# Patient Record
Sex: FEMALE | Race: BLACK OR AFRICAN AMERICAN | Employment: FULL TIME | URBAN - METROPOLITAN AREA
[De-identification: names, ages, dates, MRNs, and addresses within clinical notes are randomized per-mention and may not be internally consistent; named-entity substitution may affect disease eponyms.]

---

## 2017-02-07 ENCOUNTER — DOCUMENTATION ONLY (OUTPATIENT)
Dept: FAMILY MEDICINE CLINIC | Age: 32
End: 2017-02-07

## 2017-02-07 NOTE — LETTER
2/7/2017 Maddie Spear 
[address] [phone] Dear Ms. Maddie Spear, We had an appointment reserved for you 1/24/2017 and were concerned when you did not show or call within 24 hours to cancel the appointment. Our policy is to call patients two days prior to their appointment to remind them of the date and time. We perform these calls as a courtesy to our patients and to allow us the opportunity to rebook the time slot should the appointment not be necessary. Recognizing that everyones time is valuable and that appointment time is limited, we ask that you provide 24 hours notice if you are unable to keep your appointment. Please call us at your earliest convenience to reschedule your appointment as your provider felt it was important to see you. Thank you for your anticipated cooperation. The scheduling staff: 
 
[address] [place] [address] [address] [phone]

## 2017-02-13 ENCOUNTER — TELEPHONE (OUTPATIENT)
Dept: FAMILY MEDICINE CLINIC | Age: 32
End: 2017-02-13

## 2017-02-13 NOTE — TELEPHONE ENCOUNTER
Pt called stating that she requested to see a different endocrinologist but has not heard back from nurse in regards to it.  Please assist.

## 2017-02-13 NOTE — TELEPHONE ENCOUNTER
Referral was processed on 1/4/17 see notes below:      Type Date User    01/04/2017 10:21 AM Joseluis De Paz LPN      Summary   referral processed. Note   Note     Faxed completed referral form to [facility]. Patient was also given a copy to self schedule, as she is seeking a second opinion. Contacted Dr. Fior De León office at [phone] to follow up on referral. Spoke to [facility] [place] who stated that Dr. Fior De León new patient appointments are booking in July currently. They will contact patient for scheduling soon. ________________________________________________    Regla Hernandezone to patient who requested another office so she can be seen sooner. Referral sent to  [facility] Phone: [phone]  Fax: [phone]     Referral reprocessed to [facility] location, I provided patient with the number to follow up with their office next week. Patient stated an understanding.

## 2017-03-29 ENCOUNTER — OFFICE VISIT (OUTPATIENT)
Dept: FAMILY MEDICINE CLINIC | Age: 32
End: 2017-03-29

## 2017-03-29 VITALS
SYSTOLIC BLOOD PRESSURE: 158 MMHG | HEIGHT: 68 IN | RESPIRATION RATE: 16 BRPM | HEART RATE: 86 BPM | OXYGEN SATURATION: 98 % | BODY MASS INDEX: 44.25 KG/M2 | DIASTOLIC BLOOD PRESSURE: 100 MMHG | WEIGHT: 292 LBS | TEMPERATURE: 98.9 F

## 2017-03-29 DIAGNOSIS — J11.1 INFLUENZA: Primary | ICD-10-CM

## 2017-03-29 DIAGNOSIS — J02.9 SORE THROAT: ICD-10-CM

## 2017-03-29 LAB
S PYO AG THROAT QL: NEGATIVE
VALID INTERNAL CONTROL?: YES

## 2017-03-29 RX ORDER — BENZONATATE 100 MG/1
100 CAPSULE ORAL
Qty: 21 CAP | Refills: 0 | Status: SHIPPED | OUTPATIENT
Start: 2017-03-29 | End: 2017-04-05

## 2017-03-29 RX ORDER — OSELTAMIVIR PHOSPHATE 75 MG/1
75 CAPSULE ORAL 2 TIMES DAILY
Qty: 10 CAP | Refills: 0 | Status: SHIPPED | OUTPATIENT
Start: 2017-03-29 | End: 2017-04-03

## 2017-03-29 NOTE — LETTER
NOTIFICATION RETURN TO WORK  
 
3/29/2017 10:14 AM 
 
Ms. Rosalinda Little 
[address] [place] [id number] To Whom It May Concern: 
 
Rosalinda Little is currently under the care of [address] [place] [address] [place]. She will return to work on: 3/31/2017 If there are questions or concerns please have the patient contact our office. Sincerely, Ann-Marie Ogden MD

## 2017-03-29 NOTE — PROGRESS NOTES
Subjective:   Kaila Saunders is a 36 y.o. female who present complaining of flu-like symptoms: fevers, chills, myalgias, congestion, sore throat and cough for 1 day. She denies dyspnea or wheezing. Smoking status: non-smoker. Flu vaccine status: not vaccinated this season. Relevant PMH: No pertinent additional PMH. Review of Systems  Constitutional: positive for sweats  Respiratory: positive for cough  Cardiovascular: negative for chest pain  Gastrointestinal: positive for decreased appetite  Genitourinary:negative for frequency and dysuria  Neurological: negative for gait problems and weakness    Patient Active Problem List   Diagnosis Code    Hypertension I10    Prediabetes R73.03    Hemorrhoid K64.9    Anal fissure K60.2    Hyperthyroidism E05.90     Patient Active Problem List    Diagnosis Date Noted    Hyperthyroidism 02/23/2016    Anal fissure 12/04/2015    Hemorrhoid 12/02/2015    Hypertension 10/27/2015    Prediabetes 10/27/2015     Current Outpatient Prescriptions   Medication Sig Dispense Refill    propylthiouracil (PTU) 50 mg tablet       hydroCHLOROthiazide (HYDRODIURIL) 12.5 mg tablet Take 1 Tab by mouth daily. 90 Tab 3    verapamil (CALAN) 80 mg tablet Take 1 Tab by mouth three (3) times daily. Indications: Hypertension 270 Tab 3    atenolol (TENORMIN) 25 mg tablet Take 1 Tab by mouth two (2) times a day. 180 Tab 3    prenatal vit-iron fumarate-fa (PRENATAL PLUS WITH IRON) 28 mg iron- 800 mcg tab Take 1 Tab by mouth daily. 100 Tab 4    NIFEdipine, Bulk, powd 0.3 % compound, Apply as dircted 90 g 2    nitroglycerin (RECTIV) 0.4 % (w/w) ointment Insert  into rectum every twelve (12) hours every twelve (12) hours. 30 g 0    polyethylene glycol (MIRALAX) 17 gram packet Take 1 Packet by mouth daily. 14 Each 1    metFORMIN (GLUCOPHAGE) 1,000 mg tablet Take 1 Tab by mouth two (2) times daily (with meals).  60 Tab 1     Allergies   Allergen Reactions    Oxycodone Hives Past Medical History:   Diagnosis Date    Diabetes ([address]     External hemorrhoid, bleeding     Hypertension     Hyperthyroidism     PCOS (polycystic ovarian syndrome)      Past Surgical History:   Procedure Laterality Date    HX CHOLECYSTECTOMY      HX GI       Family History   Problem Relation Age of Onset    Hypertension Mother     Hypertension Father      Social History   Substance Use Topics    Smoking status: Never Smoker    Smokeless tobacco: Never Used    Alcohol use No       Objective:     Visit Vitals    BP (!) 158/100 (BP 1 Location: Left arm, BP Patient Position: Sitting)    Pulse 86    Temp 98.9 °F (37.2 °C) (Oral)    Resp 16    Ht 5' 8\" (1.727 m)    Wt 292 lb (132.5 kg)    SpO2 98%    BMI 44.4 kg/m2       Appears moderately ill but not toxic; temperature as noted in vitals. Ears normal.   Throat and pharynx normal.    Neck supple. No adenopathy in the neck. Sinuses non tender. The chest is clear. Assessment/Plan:   Influenza very likely from clinical presentation and seasonal pattern  Considerations for specific influenza anti-viral therapy: symptoms present < 48 hours, antiviral therapy is indicated, influenza type A likely based on current community prevalence  Symptomatic therapy suggested: rest, increase fluids, gargle prn for sore throat and call prn if symptoms persist or worsen. Call or return to clinic prn if these symptoms worsen or fail to improve as anticipated. ICD-10-CM ICD-9-CM    1. Influenza J11.1 487.1 oseltamivir (TAMIFLU) 75 mg capsule      benzonatate (TESSALON) 100 mg capsule   .

## 2017-03-29 NOTE — PROGRESS NOTES
*ATTENTION:  This note has been created by a medical student for educational purposes only. Please do not refer to the content of this note for clinical decision-making, billing, or other purposes. Please see attending physicians note to obtain clinical information on this patient. *       Student Progress Note  Please refer to attendings daily rounding note for full details      Patient: Rachel Leung MRN: [id number]  CSN: [id number]    YOB: 1985  Age: 36 y.o. Sex: female    DOA: (Not on file) LOS:  LOS: 0 days          Chief Complaint:  Sore throat    Subjective:    A 36year old [facility] American female with a past medical history significant for hypertension, pre-diabetes, and hyperthyroid presents with a 24 hour history of sore throat, cough, headache, and left greater than right ear pain, and fever of 100.3 yesterday. She denies rhinorrhea, body aches, vomiting, nausea, diarrhea, itchy or watery eyes. A coworker has had similar symptoms. She did not receive the flu shot this fall. Her blood pressure is elevated today but she did not take her medications this morning. Objective:      Visit Vitals    BP (!) 158/100 (BP 1 Location: Left arm, BP Patient Position: Sitting)    Pulse 86    Temp 98.9 °F (37.2 °C) (Oral)    Resp 16    Ht 5' 8\" (1.727 m)    Wt 292 lb (132.5 kg)    SpO2 98%    BMI 44.4 kg/m2         Negative rapid strep test.       Assessment:         Plan:         Tristen Salinas  3/29/2017  9:46 AM  *ATTENTION:  This note has been created by a medical student for educational purposes only. Please do not refer to the content of this note for clinical decision-making, billing, or other purposes. Please see attending physicians note to obtain clinical information on this patient. *

## 2017-03-29 NOTE — PATIENT INSTRUCTIONS
Influenza (Flu): Care Instructions  Your Care Instructions  Influenza (flu) is an infection in the lungs and breathing passages. It is caused by the influenza virus. There are different strains, or types, of the flu virus from year to year. Unlike the common cold, the flu comes on suddenly and the symptoms, such as a cough, congestion, fever, chills, fatigue, aches, and pains, are more severe. These symptoms may last up to 10 days. Although the flu can make you feel very sick, it usually doesn't cause serious health problems. Home treatment is usually all you need for flu symptoms. But your doctor may prescribe antiviral medicine to prevent other health problems, such as pneumonia, from developing. Older people and those who have a long-term health condition, such as lung disease, are most at risk for having pneumonia or other health problems. Follow-up care is a key part of your treatment and safety. Be sure to make and go to all appointments, and call your doctor if you are having problems. Its also a good idea to know your test results and keep a list of the medicines you take. How can you care for yourself at home? · Get plenty of rest.  · Drink plenty of fluids, enough so that your urine is light yellow or clear like water. If you have kidney, heart, or liver disease and have to limit fluids, talk with your doctor before you increase the amount of fluids you drink. · Take an over-the-counter pain medicine if needed, such as acetaminophen (Tylenol), ibuprofen (Advil, Motrin), or naproxen (Aleve), to relieve fever, headache, and muscle aches. Read and follow all instructions on the label. No one younger than 20 should take aspirin. It has been linked to Reye syndrome, a serious illness. · Do not smoke. Smoking can make the flu worse. If you need help quitting, talk to your doctor about stop-smoking programs and medicines. These can increase your chances of quitting for good.   · Breathe moist air from a hot shower or from a sink filled with hot water to help clear a stuffy nose. · Before you use cough and cold medicines, check the label. These medicines may not be safe for young children or for people with certain health problems. · If the skin around your nose and lips becomes sore, put some petroleum jelly on the area. · To ease coughing:  ¨ Drink fluids to soothe a scratchy throat. ¨ Suck on cough drops or plain hard candy. ¨ Take an over-the-counter cough medicine that contains dextromethorphan to help you get some sleep. Read and follow all instructions on the label. ¨ Raise your head at night with an extra pillow. This may help you rest if coughing keeps you awake. · Take any prescribed medicine exactly as directed. Call your doctor if you think you are having a problem with your medicine. To avoid spreading the flu  · Wash your hands regularly, and keep your hands away from your face. · Stay home from school, work, and other public places until you are feeling better and your fever has been gone for at least 24 hours. The fever needs to have gone away on its own without the help of medicine. · Ask people living with you to talk to their doctors about preventing the flu. They may get antiviral medicine to keep from getting the flu from you. · To prevent the flu in the future, get a flu vaccine every fall. Encourage people living with you to get the vaccine. · Cover your mouth when you cough or sneeze. When should you call for help? Call 911 anytime you think you may need emergency care. For example, call if:  · You have severe trouble breathing. Call your doctor now or seek immediate medical care if:  · You have new or worse trouble breathing. · You seem to be getting much sicker. · You feel very sleepy or confused. · You have a new or higher fever. · You get a new rash.   Watch closely for changes in your health, and be sure to contact your doctor if:  · You begin to get better and then get worse. · You are not getting better after 1 week. Where can you learn more? Go to http://estelle-arnaldo.info/. Enter M744 in the search box to learn more about \"Influenza (Flu): Care Instructions. \"  Current as of: May 23, 2016  Content Version: 11.2  © 4523-3634 [employer]. Care instructions adapted under license by [employer] (which disclaims liability or warranty for this information). If you have questions about a medical condition or this instruction, always ask your healthcare professional. [address] any warranty or liability for your use of this information.

## 2017-03-29 NOTE — MR AVS SNAPSHOT
Visit Information Date & Time Provider Department Dept. Phone Encounter #  
 3/29/2017  9:30 AM Carlos AdelitaRichard 6 [phone] [id number] Follow-up Instructions Return if symptoms worsen or fail to improve. Upcoming Health Maintenance Date Due DTaP/Tdap/Td series (1 - Tdap) 8/31/2008 PAP AKA CERVICAL CYTOLOGY 7/27/2018 Allergies as of 3/29/2017  Review Complete On: 12/22/2016 By: Carlos Ureña MD  
  
 Severity Noted Reaction Type Reactions Oxycodone  10/27/2015    Hives Current Immunizations  Never Reviewed No immunizations on file. Not reviewed this visit You Were Diagnosed With   
  
 Codes Comments Influenza    -  Primary ICD-10-CM: J11.1 ICD-9-CM: 487. 1 Vitals BP Pulse Temp Resp Height(growth percentile) Weight(growth percentile) (!) 158/100 (BP 1 Location: Left arm, BP Patient Position: Sitting) 86 98.9 °F (37.2 °C) (Oral) 16 5' 8\" (1.727 m) 292 lb (132.5 kg) SpO2 BMI OB Status Smoking Status 98% 44.4 kg/m2 Having regular periods Never Smoker Vitals History BMI and BSA Data Body Mass Index Body Surface Area 44.4 kg/m 2 2.52 m 2 Preferred Pharmacy Pharmacy Name Phone [facility] [address] [phone] Your Updated Medication List  
  
   
This list is accurate as of: 3/29/17 10:15 AM.  Always use your most recent med list.  
  
  
  
  
 atenolol 25 mg tablet Commonly known as:  TENORMIN Take 1 Tab by mouth two (2) times a day. benzonatate 100 mg capsule Commonly known as:  TESSALON Take 1 Cap by mouth three (3) times daily as needed for Cough for up to 7 days. hydroCHLOROthiazide 12.5 mg tablet Commonly known as:  HYDRODIURIL Take 1 Tab by mouth daily. metFORMIN 1,000 mg tablet Commonly known as:  GLUCOPHAGE  
 Take 1 Tab by mouth two (2) times daily (with meals). NIFEdipine (Bulk) Powd  
0.3 % compound, Apply as dircted  
  
 nitroglycerin 0.4 % (w/w) ointment Commonly known as:  RECTIV Insert  into rectum every twelve (12) hours every twelve (12) hours. oseltamivir 75 mg capsule Commonly known as:  TAMIFLU Take 1 Cap by mouth two (2) times a day for 5 days. Indications: INFLUENZA  
  
 polyethylene glycol 17 gram packet Commonly known as:  Aurther Buck Take 1 Packet by mouth daily. prenatal vit-iron fumarate-fa 28 mg iron- 800 mcg Tab Commonly known as:  PRENATAL PLUS with IRON Take 1 Tab by mouth daily. propylthiouracil 50 mg tablet Commonly known as:  PTU  
  
 verapamil 80 mg tablet Commonly known as:  CALAN Take 1 Tab by mouth three (3) times daily. Indications: Hypertension Prescriptions Sent to Pharmacy Refills  
 oseltamivir (TAMIFLU) 75 mg capsule 0 Sig: Take 1 Cap by mouth two (2) times a day for 5 days. Indications: INFLUENZA Class: Normal  
 Pharmacy: [facility] [address] [place] [address] Ph #: [phone] Route: Oral  
 benzonatate (TESSALON) 100 mg capsule 0 Sig: Take 1 Cap by mouth three (3) times daily as needed for Cough for up to 7 days. Class: Normal  
 Pharmacy: [facility] [address] Ph #: [phone] Route: Oral  
  
Follow-up Instructions Return if symptoms worsen or fail to improve. Patient Instructions Influenza (Flu): Care Instructions Your Care Instructions Influenza (flu) is an infection in the lungs and breathing passages. It is caused by the influenza virus. There are different strains, or types, of the flu virus from year to year.  Unlike the common cold, the flu comes on suddenly and the symptoms, such as a cough, congestion, fever, chills, fatigue, aches, and pains, are more severe. These symptoms may last up to 10 days. Although the flu can make you feel very sick, it usually doesn't cause serious health problems. Home treatment is usually all you need for flu symptoms. But your doctor may prescribe antiviral medicine to prevent other health problems, such as pneumonia, from developing. Older people and those who have a long-term health condition, such as lung disease, are most at risk for having pneumonia or other health problems. Follow-up care is a key part of your treatment and safety. Be sure to make and go to all appointments, and call your doctor if you are having problems. Its also a good idea to know your test results and keep a list of the medicines you take. How can you care for yourself at home? · Get plenty of rest. 
· Drink plenty of fluids, enough so that your urine is light yellow or clear like water. If you have kidney, heart, or liver disease and have to limit fluids, talk with your doctor before you increase the amount of fluids you drink. · Take an over-the-counter pain medicine if needed, such as acetaminophen (Tylenol), ibuprofen (Advil, Motrin), or naproxen (Aleve), to relieve fever, headache, and muscle aches. Read and follow all instructions on the label. No one younger than 20 should take aspirin. It has been linked to Reye syndrome, a serious illness. · Do not smoke. Smoking can make the flu worse. If you need help quitting, talk to your doctor about stop-smoking programs and medicines. These can increase your chances of quitting for good. · Breathe moist air from a hot shower or from a sink filled with hot water to help clear a stuffy nose. · Before you use cough and cold medicines, check the label. These medicines may not be safe for young children or for people with certain health problems. · If the skin around your nose and lips becomes sore, put some petroleum jelly on the area. · To ease coughing: ¨ Drink fluids to soothe a scratchy throat. ¨ Suck on cough drops or plain hard candy. ¨ Take an over-the-counter cough medicine that contains dextromethorphan to help you get some sleep. Read and follow all instructions on the label. ¨ Raise your head at night with an extra pillow. This may help you rest if coughing keeps you awake. · Take any prescribed medicine exactly as directed. Call your doctor if you think you are having a problem with your medicine. To avoid spreading the flu · Wash your hands regularly, and keep your hands away from your face. · Stay home from school, work, and other public places until you are feeling better and your fever has been gone for at least 24 hours. The fever needs to have gone away on its own without the help of medicine. · Ask people living with you to talk to their doctors about preventing the flu. They may get antiviral medicine to keep from getting the flu from you. · To prevent the flu in the future, get a flu vaccine every fall. Encourage people living with you to get the vaccine. · Cover your mouth when you cough or sneeze. When should you call for help? Call 911 anytime you think you may need emergency care. For example, call if: 
· You have severe trouble breathing. Call your doctor now or seek immediate medical care if: 
· You have new or worse trouble breathing. · You seem to be getting much sicker. · You feel very sleepy or confused. · You have a new or higher fever. · You get a new rash. Watch closely for changes in your health, and be sure to contact your doctor if: 
· You begin to get better and then get worse. · You are not getting better after 1 week. Where can you learn more? Go to http://estelle-arnaldo.info/. Enter V535 in the search box to learn more about \"Influenza (Flu): Care Instructions. \" Current as of: May 23, 2016 Content Version: 11.2 © 7203-8629 [employer].  Care instructions adapted under license by [address] (which disclaims liability or warranty for this information). If you have questions about a medical condition or this instruction, always ask your healthcare professional. [address] any warranty or liability for your use of this information. Introducing [facility]! Dear [place]: Thank you for requesting a Xsigo account. Our records indicate that you already have an active Xsigo account. You can access your account anytime at [email or web address] Did you know that you can access your hospital and ER discharge instructions at any time in Xsigo? You can also review all of your test results from your hospital stay or ER visit. Additional Information If you have questions, please visit the Frequently Asked Questions section of the Xsigo website at [email or web address] Remember, Xsigo is NOT to be used for urgent needs. For medical emergencies, dial 911. Now available from your iPhone and Android! Please provide this summary of care documentation to your next provider. Your primary care clinician is listed as Isabel Thompson. If you have any questions after today's visit, please call [phone].

## 2017-05-12 ENCOUNTER — OFFICE VISIT (OUTPATIENT)
Dept: FAMILY MEDICINE CLINIC | Age: 32
End: 2017-05-12

## 2017-05-12 VITALS
TEMPERATURE: 98.2 F | SYSTOLIC BLOOD PRESSURE: 166 MMHG | HEIGHT: 68 IN | HEART RATE: 80 BPM | BODY MASS INDEX: 44.41 KG/M2 | WEIGHT: 293 LBS | RESPIRATION RATE: 16 BRPM | DIASTOLIC BLOOD PRESSURE: 82 MMHG | OXYGEN SATURATION: 98 %

## 2017-05-12 DIAGNOSIS — H00.011 HORDEOLUM EXTERNUM OF RIGHT UPPER EYELID: Primary | ICD-10-CM

## 2017-05-12 RX ORDER — ERYTHROMYCIN 5 MG/G
0.1 OINTMENT OPHTHALMIC
Qty: 1 TUBE | Refills: 0 | Status: SHIPPED | OUTPATIENT
Start: 2017-05-12 | End: 2017-05-22

## 2017-05-12 NOTE — LETTER
NOTIFICATION RETURN TO WORK 
 
5/12/2017 1:28 PM 
 
Ms. Mary Gonzalez 
[address] [place] [phone] To Whom It May Concern: 
 
Mary Gonzalez is currently under the care of [address] [place] [address]. She will return to work on: 5/12/2017 If there are questions or concerns please have the patient contact our office. Sincerely, Keiyr Cerna MD

## 2017-05-12 NOTE — PROGRESS NOTES
Nery Morrell is a 36 y.o.  female and presents with    Chief Complaint   Patient presents with    Eye Swelling           Subjective:  Ms. Jason Lord presents c/o right eye swelling. This started a few days ago. She has used a warm compress but the swelling of her upper eyelid worsened. She denies fever or chills. She denies injury to her eye. She had stye several years ago. She denies exposure to pink eye. She denies discharge. Eye is itchy. Patient Active Problem List   Diagnosis Code    Hypertension I10    Prediabetes R73.03    Hemorrhoid K64.9    Anal fissure K60.2    Hyperthyroidism E05.90     Patient Active Problem List    Diagnosis Date Noted    Hyperthyroidism 02/23/2016    Anal fissure 12/04/2015    Hemorrhoid 12/02/2015    Hypertension 10/27/2015    Prediabetes 10/27/2015     Current Outpatient Prescriptions   Medication Sig Dispense Refill    propylthiouracil (PTU) 50 mg tablet       hydroCHLOROthiazide (HYDRODIURIL) 12.5 mg tablet Take 1 Tab by mouth daily. 90 Tab 3    verapamil (CALAN) 80 mg tablet Take 1 Tab by mouth three (3) times daily. Indications: Hypertension 270 Tab 3    atenolol (TENORMIN) 25 mg tablet Take 1 Tab by mouth two (2) times a day. 180 Tab 3    prenatal vit-iron fumarate-fa (PRENATAL PLUS WITH IRON) 28 mg iron- 800 mcg tab Take 1 Tab by mouth daily. 100 Tab 4    NIFEdipine, Bulk, powd 0.3 % compound, Apply as dircted 90 g 2    nitroglycerin (RECTIV) 0.4 % (w/w) ointment Insert  into rectum every twelve (12) hours every twelve (12) hours. 30 g 0    polyethylene glycol (MIRALAX) 17 gram packet Take 1 Packet by mouth daily. 14 Each 1    metFORMIN (GLUCOPHAGE) 1,000 mg tablet Take 1 Tab by mouth two (2) times daily (with meals).  60 Tab 1     Allergies   Allergen Reactions    Oxycodone Hives     Past Medical History:   Diagnosis Date    Diabetes ([address]     External hemorrhoid, bleeding     Hypertension     Hyperthyroidism     PCOS (polycystic ovarian syndrome)      Past Surgical History:   Procedure Laterality Date    HX CHOLECYSTECTOMY      HX GI       Family History   Problem Relation Age of Onset    Hypertension Mother     Hypertension Father      Social History   Substance Use Topics    Smoking status: Never Smoker    Smokeless tobacco: Never Used    Alcohol use No       ROS   General ROS: negative for - chills or fever  ENT ROS: negative for - headaches  Endocrine ROS: negative for - polydipsia/polyuria or temperature intolerance  Respiratory ROS: no cough, shortness of breath, or wheezing  Cardiovascular ROS: no chest pain or dyspnea on exertion  Gastrointestinal ROS: no abdominal pain, change in bowel habits, or black or bloody stools  Genito-Urinary ROS: no dysuria, trouble voiding, or hematuria  Neurological ROS: no TIA or stroke symptoms  Dermatological ROS: negative for - rash or skin lesion changes    All other systems reviewed and are negative. Objective:  Vitals:    05/12/17 1316   BP: 166/82   Pulse: 80   Resp: 16   Temp: 98.2 °F (36.8 °C)   TempSrc: Oral   SpO2: 98%   Weight: 299 lb (135.6 kg)   Height: 5' 8\" (1.727 m)   PainSc:   0 - No pain   PainLoc: Eye       alert, well appearing, and in no distress, oriented to person, place, and time and morbid obesity  Mental status - normal mood, behavior, speech, dress, motor activity, and thought processes  Eyes - right eye edema with lesion on lateral aspect of upper eye lid    Assessment/Plan:    1. Hordeolum externum of right upper eyelid  Continue warm compress and start night time dosing of erythromycin  - erythromycin (ILOTYCIN) ophthalmic ointment; Administer 0.1 g to right eye nightly for 10 days. Dispense: 1 Tube; Refill: 0      Lab review: no lab studies available for review at time of visit      I have discussed the diagnosis with the patient and the intended plan as seen in the above orders.   The patient has received an after-visit summary and questions were answered concerning future plans. I have discussed medication side effects and warnings with the patient as well. I have reviewed the plan of care with the patient, accepted their input and they are in agreement with the treatment goals. Follow-up Disposition:  Return if symptoms worsen or fail to improve.

## 2017-05-12 NOTE — PATIENT INSTRUCTIONS
Styes and Chalazia: Care Instructions  Your Care Instructions    Styes and chalazia (say \"joa-DSZ-wrp-uh\") are both conditions that can cause swelling of the eyelid. A stye is an infection in the root of an eyelash. The infection causes a tender red lump on the edge of the eyelid. The infection can spread until the whole eyelid becomes red and inflamed. Styes usually break open, and a tiny amount of pus drains. They usually clear up on their own in about a week, but they sometimes need treatment with antibiotics. A chalazion is a lump or cyst in the eyelid (chalazion is singular; chalazia is plural). It is caused by swelling and inflammation of deep oil glands inside the eyelid. Chalazia are usually not infected. They can take a few months to heal.  If a chalazion becomes more swollen and painful or does not go away, you may need to have it drained by your doctor. Follow-up care is a key part of your treatment and safety. Be sure to make and go to all appointments, and call your doctor if you are having problems. It's also a good idea to know your test results and keep a list of the medicines you take. How can you care for yourself at home? · Do not rub your eyes. Do not squeeze or try to open a stye or chalazion. · To help a stye or chalazion heal faster:  ¨ Put a warm, moist compress on your eye for 5 to 10 minutes, 3 to 6 times a day. Heat often brings a stye to a point where it drains on its own. Keep in mind that warm compresses will often increase swelling a little at first.  ¨ Do not use hot water or heat a wet cloth in a microwave oven. The compress may get too hot and can burn the eyelid. · Always wash your hands before and after you use a compress or touch your eyes. · If the doctor gave you antibiotic drops or ointment, use the medicine exactly as directed. Use the medicine for as long as instructed, even if your eye starts to feel better.   · To put in eyedrops or ointment:  ¨ Tilt your head back, and pull your lower eyelid down with one finger. ¨ Drop or squirt the medicine inside the lower lid. ¨ Close your eye for 30 to 60 seconds to let the drops or ointment move around. ¨ Do not touch the ointment or dropper tip to your eyelashes or any other surface. · Do not wear eye makeup or contact lenses until the stye or chalazion heals. · Do not share towels, pillows, or washcloths while you have a stye. When should you call for help? Call your doctor now or seek immediate medical care if:  · You have pain in your eye. · You have a change in vision or loss of vision. · Redness and swelling get much worse. Watch closely for changes in your health, and be sure to contact your doctor if:  · Your stye does not get better in 1 week. · Your chalazion does not start to get better after several weeks. Where can you learn more? Go to http://estelle-arnaldo.info/. Enter O164 in the search box to learn more about \"Styes and Chalazia: Care Instructions. \"  Current as of: May 23, 2016  Content Version: 11.2  © 4474-0323 [employer] [place]. Care instructions adapted under license by [employer] (which disclaims liability or warranty for this information). If you have questions about a medical condition or this instruction, always ask your healthcare professional. [address] any warranty or liability for your use of this information.

## 2017-05-12 NOTE — PROGRESS NOTES
Jerrod Almanza is a 36 y.o female that is present in the office for a same day sick appt for right swollen eye. 1. Have you been to the ER, urgent care clinic since your last visit? Hospitalized since your last visit? No    2. Have you seen or consulted any other health care providers outside of the [address] [place] [address] since your last visit? Include any pap smears or colon screening.  No

## 2017-05-12 NOTE — MR AVS SNAPSHOT
Visit Information Date & Time Provider Department Dept. Phone Encounter #  
 5/12/2017  1:00 PM Emmie Adame, [address] [facility] [phone] [id number] Follow-up Instructions Return if symptoms worsen or fail to improve. Upcoming Health Maintenance Date Due DTaP/Tdap/Td series (1 - Tdap) 8/31/2008 INFLUENZA AGE 9 TO ADULT 8/1/2017 PAP AKA CERVICAL CYTOLOGY 7/27/2018 Allergies as of 5/12/2017  Review Complete On: 5/12/2017 By: Emmie Adame MD  
  
 Severity Noted Reaction Type Reactions Oxycodone  10/27/2015    Hives Current Immunizations  Never Reviewed No immunizations on file. Not reviewed this visit You Were Diagnosed With   
  
 Codes Comments Hordeolum externum of right upper eyelid    -  Primary ICD-10-CM: H00.011 ICD-9-CM: 373.11 Vitals BP Pulse Temp Resp Height(growth percentile) Weight(growth percentile) 166/82 (BP 1 Location: Right arm, BP Patient Position: Sitting) 80 98.2 °F (36.8 °C) (Oral) 16 5' 8\" (1.727 m) 299 lb (135.6 kg) SpO2 BMI OB Status Smoking Status 98% 45.46 kg/m2 Having regular periods Never Smoker BMI and BSA Data Body Mass Index Body Surface Area 45.46 kg/m 2 2.55 m 2 Preferred Pharmacy Pharmacy Name Phone [facility], [address] [phone] Your Updated Medication List  
  
   
This list is accurate as of: 5/12/17  1:27 PM.  Always use your most recent med list.  
  
  
  
  
 atenolol 25 mg tablet Commonly known as:  TENORMIN Take 1 Tab by mouth two (2) times a day. erythromycin ophthalmic ointment Commonly known as:  ILOTYCIN Administer 0.1 g to right eye nightly for 10 days. hydroCHLOROthiazide 12.5 mg tablet Commonly known as:  HYDRODIURIL Take 1 Tab by mouth daily. metFORMIN 1,000 mg tablet Commonly known as:  GLUCOPHAGE  
 Take 1 Tab by mouth two (2) times daily (with meals). NIFEdipine (Bulk) Powd  
0.3 % compound, Apply as dircted  
  
 nitroglycerin 0.4 % (w/w) ointment Commonly known as:  RECTIV Insert  into rectum every twelve (12) hours every twelve (12) hours. polyethylene glycol 17 gram packet Commonly known as:  David Varner Take 1 Packet by mouth daily. prenatal vit-iron fumarate-fa 28 mg iron- 800 mcg Tab Commonly known as:  PRENATAL PLUS with IRON Take 1 Tab by mouth daily. propylthiouracil 50 mg tablet Commonly known as:  PTU  
  
 verapamil 80 mg tablet Commonly known as:  CALAN Take 1 Tab by mouth three (3) times daily. Indications: Hypertension Prescriptions Sent to Pharmacy Refills  
 erythromycin (ILOTYCIN) ophthalmic ointment 0 Sig: Administer 0.1 g to right eye nightly for 10 days. Class: Normal  
 Pharmacy: [facility] [address] [place] [address] #: [phone] Route: Right Eye Follow-up Instructions Return if symptoms worsen or fail to improve. Patient Instructions Styes and Chalazia: Care Instructions Your Care Instructions Styes and chalazia (say \"vxa-IVX-lnx-\") are both conditions that can cause swelling of the eyelid. A stye is an infection in the root of an eyelash. The infection causes a tender red lump on the edge of the eyelid. The infection can spread until the whole eyelid becomes red and inflamed. Styes usually break open, and a tiny amount of pus drains. They usually clear up on their own in about a week, but they sometimes need treatment with antibiotics. A chalazion is a lump or cyst in the eyelid (chalazion is singular; chalazia is plural). It is caused by swelling and inflammation of deep oil glands inside the eyelid. Chalazia are usually not infected.  They can take a few months to heal. 
 If a chalazion becomes more swollen and painful or does not go away, you may need to have it drained by your doctor. Follow-up care is a key part of your treatment and safety. Be sure to make and go to all appointments, and call your doctor if you are having problems. It's also a good idea to know your test results and keep a list of the medicines you take. How can you care for yourself at home? · Do not rub your eyes. Do not squeeze or try to open a stye or chalazion. · To help a stye or chalazion heal faster: ¨ Put a warm, moist compress on your eye for 5 to 10 minutes, 3 to 6 times a day. Heat often brings a stye to a point where it drains on its own. Keep in mind that warm compresses will often increase swelling a little at first. 
¨ Do not use hot water or heat a wet cloth in a microwave oven. The compress may get too hot and can burn the eyelid. · Always wash your hands before and after you use a compress or touch your eyes. · If the doctor gave you antibiotic drops or ointment, use the medicine exactly as directed. Use the medicine for as long as instructed, even if your eye starts to feel better. · To put in eyedrops or ointment: ¨ Tilt your head back, and pull your lower eyelid down with one finger. ¨ Drop or squirt the medicine inside the lower lid. ¨ Close your eye for 30 to 60 seconds to let the drops or ointment move around. ¨ Do not touch the ointment or dropper tip to your eyelashes or any other surface. · Do not wear eye makeup or contact lenses until the stye or chalazion heals. · Do not share towels, pillows, or washcloths while you have a stye. When should you call for help? Call your doctor now or seek immediate medical care if: 
· You have pain in your eye. · You have a change in vision or loss of vision. · Redness and swelling get much worse. Watch closely for changes in your health, and be sure to contact your doctor if: 
· Your stye does not get better in 1 week. · Your chalazion does not start to get better after several weeks. Where can you learn more? Go to http://estelle-arnaldo.info/. Enter P765 in the search box to learn more about \"Styes and Chalazia: Care Instructions. \" Current as of: May 23, 2016 Content Version: 11.2 © 7068-0618 [employer]. Care instructions adapted under license by [employer] (which disclaims liability or warranty for this information). If you have questions about a medical condition or this instruction, always ask your healthcare professional. [address] any warranty or liability for your use of this information. Introducing [facility] & HEALTH SERVICES! Dear Araseli Gates: Thank you for requesting a [employer] account. Our records indicate that you already have an active [employer] account. You can access your account anytime at [email or web address]. M[employer]D [employer] Did you know that you can access your hospital and ER discharge instructions at any time in [employer]? You can also review all of your test results from your hospital stay or ER visit. Additional Information If you have questions, please visit the Frequently Asked Questions section of the [employer] website at [email or web address]. [employer]/. Remember, [employer] is NOT to be used for urgent needs. For medical emergencies, dial 911. Now available from your iPhone and Android! Please provide this summary of care documentation to your next provider. Your primary care clinician is listed as Maria G Barrientos. If you have any questions after today's visit, please call [phone].

## 2017-11-22 ENCOUNTER — HOSPITAL ENCOUNTER (OUTPATIENT)
Dept: LAB | Age: 32
Discharge: HOME | End: 2017-11-22
Payer: COMMERCIAL

## 2017-11-22 ENCOUNTER — OFFICE VISIT (OUTPATIENT)
Dept: FAMILY MEDICINE CLINIC | Age: 32
End: 2017-11-22

## 2017-11-22 VITALS
WEIGHT: 293 LBS | RESPIRATION RATE: 16 BRPM | HEART RATE: 81 BPM | DIASTOLIC BLOOD PRESSURE: 88 MMHG | TEMPERATURE: 95.7 F | BODY MASS INDEX: 44.41 KG/M2 | OXYGEN SATURATION: 96 % | HEIGHT: 68 IN | SYSTOLIC BLOOD PRESSURE: 158 MMHG

## 2017-11-22 DIAGNOSIS — K64.4 EXTERNAL HEMORRHOID: ICD-10-CM

## 2017-11-22 DIAGNOSIS — E28.2 POLYCYSTIC OVARIAN SYNDROME: ICD-10-CM

## 2017-11-22 DIAGNOSIS — E07.9 THYROID DISEASE: ICD-10-CM

## 2017-11-22 DIAGNOSIS — Z00.00 ANNUAL PHYSICAL EXAM: Primary | ICD-10-CM

## 2017-11-22 DIAGNOSIS — E05.90 HYPERTHYROIDISM: ICD-10-CM

## 2017-11-22 DIAGNOSIS — Z28.21 REFUSED INFLUENZA VACCINE: ICD-10-CM

## 2017-11-22 DIAGNOSIS — E66.01 MORBID OBESITY WITH BMI OF 45.0-49.9, ADULT (HCC): ICD-10-CM

## 2017-11-22 DIAGNOSIS — I10 ESSENTIAL HYPERTENSION WITH GOAL BLOOD PRESSURE LESS THAN 130/85: ICD-10-CM

## 2017-11-22 LAB
EST. AVERAGE GLUCOSE BLD GHB EST-MCNC: 100 MG/DL
HBA1C MFR BLD: 5.1 % (ref 4.2–5.6)
TSH SERPL DL<=0.05 MIU/L-ACNC: <0.01 UIU/ML (ref 0.36–3.74)

## 2017-11-22 PROCEDURE — 83036 HEMOGLOBIN GLYCOSYLATED A1C: CPT | Performed by: FAMILY MEDICINE

## 2017-11-22 PROCEDURE — 36415 COLL VENOUS BLD VENIPUNCTURE: CPT | Performed by: FAMILY MEDICINE

## 2017-11-22 PROCEDURE — 84443 ASSAY THYROID STIM HORMONE: CPT | Performed by: FAMILY MEDICINE

## 2017-11-22 RX ORDER — ATENOLOL 25 MG/1
25 TABLET ORAL 2 TIMES DAILY
Qty: 180 TAB | Refills: 3 | Status: SHIPPED | OUTPATIENT
Start: 2017-11-22 | End: 2019-02-19 | Stop reason: SDUPTHER

## 2017-11-22 RX ORDER — METFORMIN HYDROCHLORIDE 500 MG/1
500 TABLET, EXTENDED RELEASE ORAL
Qty: 30 TAB | Refills: 2 | Status: SHIPPED | OUTPATIENT
Start: 2017-11-22 | End: 2018-12-06 | Stop reason: SDUPTHER

## 2017-11-22 RX ORDER — POLYETHYLENE GLYCOL 3350 17 G/17G
17 POWDER, FOR SOLUTION ORAL DAILY
Qty: 14 EACH | Refills: 1 | Status: SHIPPED | OUTPATIENT
Start: 2017-11-22 | End: 2019-07-23

## 2017-11-22 RX ORDER — VERAPAMIL HYDROCHLORIDE 80 MG/1
80 TABLET ORAL 3 TIMES DAILY
Qty: 270 TAB | Refills: 3 | Status: SHIPPED | OUTPATIENT
Start: 2017-11-22 | End: 2018-02-27 | Stop reason: ALTCHOICE

## 2017-11-22 NOTE — PATIENT INSTRUCTIONS

## 2017-11-22 NOTE — MR AVS SNAPSHOT
Visit Information Date & Time Provider Department Dept. Phone Encounter #  
 11/22/2017 10:00 AM Aliya Schneider, [address] [facility] [phone] [id number] Follow-up Instructions Return in about 2 weeks (around 12/6/2017) for blood pressure and lab results. Upcoming Health Maintenance Date Due DTaP/Tdap/Td series (1 - Tdap) 8/31/2008 Influenza Age 5 to Adult 8/1/2017 PAP AKA CERVICAL CYTOLOGY 7/27/2018 Allergies as of 11/22/2017  Review Complete On: 11/22/2017 By: Aliya Schneider MD  
  
 Severity Noted Reaction Type Reactions Oxycodone  10/27/2015    Hives Current Immunizations  Never Reviewed No immunizations on file. Not reviewed this visit You Were Diagnosed With   
  
 Codes Comments Annual physical exam    -  Primary ICD-10-CM: Z00.00 ICD-9-CM: V70.0 Thyroid disease     ICD-10-CM: E07.9 ICD-9-CM: 246.9 Morbid obesity with BMI of 45.0-49.9, adult (HCC)     ICD-10-CM: E66.01, Z68.42 
ICD-9-CM: 278.01, V85.42 Essential hypertension with goal blood pressure less than 130/85     ICD-10-CM: I10 
ICD-9-CM: 401.9 Hyperthyroidism     ICD-10-CM: E05.90 ICD-9-CM: 242.90 Refused influenza vaccine     ICD-10-CM: Z28.21 ICD-9-CM: V64.06 Polycystic ovarian syndrome     ICD-10-CM: E28.2 ICD-9-CM: 256.4 External hemorrhoid     ICD-10-CM: K64.4 ICD-9-CM: 373. 3 Vitals BP Pulse Temp Resp Height(growth percentile) Weight(growth percentile) 158/88 (BP 1 Location: Right arm, BP Patient Position: Sitting) 81 95.7 °F (35.4 °C) (Oral) 16 5' 8\" (1.727 m) 314 lb (142.4 kg) LMP SpO2 BMI OB Status Smoking Status 11/06/2017 (Exact Date) 96% 47.74 kg/m2 Having regular periods Never Smoker BMI and BSA Data Body Mass Index Body Surface Area 47.74 kg/m 2 2.61 m 2 Preferred Pharmacy Pharmacy Name Phone  [facility], [address] [facility] [phone] Your Updated Medication List  
  
   
This list is accurate as of: 11/22/17 10:41 AM.  Always use your most recent med list.  
  
  
  
  
 atenolol 25 mg tablet Commonly known as:  TENORMIN Take 1 Tab by mouth two (2) times a day. metFORMIN  mg tablet Commonly known as:  GLUCOPHAGE XR Take 1 Tab by mouth daily (with dinner). NIFEdipine (Bulk) Powd  
0.3 % compound, Apply as dircted  
  
 nitroglycerin 0.4 % (w/w) ointment Commonly known as:  RECTIV Insert  into rectum every twelve (12) hours every twelve (12) hours. polyethylene glycol 17 gram packet Commonly known as:  Gregory Deck Take 1 Packet by mouth daily. prenatal vit-iron fumarate-fa 28 mg iron- 800 mcg Tab Commonly known as:  PRENATAL PLUS with IRON Take 1 Tab by mouth daily. propylthiouracil 50 mg tablet Commonly known as:  PTU  
  
 verapamil 80 mg tablet Commonly known as:  CALAN Take 1 Tab by mouth three (3) times daily. Indications: hypertension Prescriptions Sent to Pharmacy Refills  
 verapamil (CALAN) 80 mg tablet 3 Sig: Take 1 Tab by mouth three (3) times daily. Indications: hypertension Class: Normal  
 Pharmacy: [facility] [address] [place] [address] Ph #: [phone] Route: Oral  
 atenolol (TENORMIN) 25 mg tablet 3 Sig: Take 1 Tab by mouth two (2) times a day. Class: Normal  
 Pharmacy: [facility] [address] [place] [address] Ph #: [phone] Route: Oral  
 metFORMIN ER (GLUCOPHAGE XR) 500 mg tablet 2 Sig: Take 1 Tab by mouth daily (with dinner). Class: Normal  
 Pharmacy: [facility] [address] [place] [address], [address] [place] [address] Ph #: [phone]  Route: Oral  
 polyethylene glycol (MIRALAX) 17 gram packet 1  
 Sig: Take 1 Packet by mouth daily. Class: Normal  
 Pharmacy: [facility] [address] [place] [address] Ave , [address] #: [phone] Route: Oral  
  
Follow-up Instructions Return in about 2 weeks (around 12/6/2017) for blood pressure and lab results. To-Do List   
 11/22/2017 Lab:  HEMOGLOBIN A1C WITH EAG   
  
 11/22/2017 Lab:  TSH 3RD GENERATION Patient Instructions Starting a Weight Loss Plan: Care Instructions Your Care Instructions If you are thinking about losing weight, it can be hard to know where to start. Your doctor can help you set up a weight loss plan that best meets your needs. You may want to take a class on nutrition or exercise, or join a weight loss support group. If you have questions about how to make changes to your eating or exercise habits, ask your doctor about seeing a registered dietitian or an exercise specialist. 
It can be a big challenge to lose weight. But you do not have to make huge changes at once. Make small changes, and stick with them. When those changes become habit, add a few more changes. If you do not think you are ready to make changes right now, try to pick a date in the future. Make an appointment to see your doctor to discuss whether the time is right for you to start a plan. Follow-up care is a key part of your treatment and safety. Be sure to make and go to all appointments, and call your doctor if you are having problems. It's also a good idea to know your test results and keep a list of the medicines you take. How can you care for yourself at home? · Set realistic goals. Many people expect to lose much more weight than is likely. A weight loss of 5% to 10% of your body weight may be enough to improve your health. · Get family and friends involved to provide support. Talk to them about why you are trying to lose weight, and ask them to help.  They can help by participating in exercise and having meals with you, even if they may be eating something different. · Find what works best for you. If you do not have time or do not like to cook, a program that offers meal replacement bars or shakes may be better for you. Or if you like to prepare meals, finding a plan that includes daily menus and recipes may be best. 
· Ask your doctor about other health professionals who can help you achieve your weight loss goals. ¨ A dietitian can help you make healthy changes in your diet. ¨ An exercise specialist or  can help you develop a safe and effective exercise program. 
¨ A counselor or psychiatrist can help you cope with issues such as depression, anxiety, or family problems that can make it hard to focus on weight loss. · Consider joining a support group for people who are trying to lose weight. Your doctor can suggest groups in your area. Where can you learn more? Go to http://estelle-arnaldo.info/. Enter U937 in the search box to learn more about \"Starting a Weight Loss Plan: Care Instructions. \" Current as of: October 13, 2016 Content Version: 11.4 © 8786-6441 [employer]. Care instructions adapted under license by [employer] (which disclaims liability or warranty for this information). If you have questions about a medical condition or this instruction, always ask your healthcare professional. [address] any warranty or liability for your use of this information. Introducing [facility] & HEALTH SERVICES! Dear Sundar Anderson: Thank you for requesting a [facility] account. Our records indicate that you already have an active [facility] account. You can access your account anytime at [email or web address]. [email or web address] Did you know that you can access your hospital and ER discharge instructions at any time in [facility]?   You can also review all of your test results from your hospital stay or ER visit. Additional Information If you have questions, please visit the Frequently Asked Questions section of the [employer] website at [email or web address] [email or web address]/. Remember, [employer] is NOT to be used for urgent needs. For medical emergencies, dial 911. Now available from your iPhone and Android! Please provide this summary of care documentation to your next provider. Your primary care clinician is listed as Abe Jama. If you have any questions after today's visit, please call [phone].

## 2017-11-22 NOTE — PROGRESS NOTES
Shirley Zee is a 29 y.o female that is present in the office for a complete physical.    1. Have you been to the ER, urgent care clinic since your last visit? Hospitalized since your last visit? No    2. Have you seen or consulted any other health care providers outside of the [address] [place] [address] since your last visit? Include any pap smears or colon screening.  No

## 2017-11-22 NOTE — PROGRESS NOTES
Dorothy Weber is a 29 y.o.  female and presents with    Chief Complaint   Patient presents with    Weight Management     Subjective: Well Adult Physical   Patient here for a comprehensive physical exam.The patient reports problems - hyperthyroid, morbid obesity, hypertension  Do you take any herbs or supplements that were not prescribed by a doctor? no Are you taking calcium supplements? no Are you taking aspirin daily? no  Hypertension  Patient is here for follow-up of hypertension. She indicates that she is feeling well and denies any symptoms referable to her hypertension. She is not exercising and is not adherent to low salt diet. Blood pressure is not well controlled at home. Use of agents associated with hypertension: none. When she used diuretic she had loss of hair and after stopping this medication her hair has grown coleman. Thyroid Review:  Patient is seen for followup of hyperthyroidism. Thyroid ROS: weight gain, feeling cold and cold intolerance and she denies constipation. She has had irregular bleeding. She has had light flow for the past 3 weeks. Patient Active Problem List   Diagnosis Code    Hypertension I10    Prediabetes R73.03    Hemorrhoid K64.9    Anal fissure K60.2    Hyperthyroidism E05.90     Patient Active Problem List    Diagnosis Date Noted    Hyperthyroidism 02/23/2016    Anal fissure 12/04/2015    Hemorrhoid 12/02/2015    Hypertension 10/27/2015    Prediabetes 10/27/2015     Current Outpatient Prescriptions   Medication Sig Dispense Refill    propylthiouracil (PTU) 50 mg tablet       hydroCHLOROthiazide (HYDRODIURIL) 12.5 mg tablet Take 1 Tab by mouth daily. 90 Tab 3    verapamil (CALAN) 80 mg tablet Take 1 Tab by mouth three (3) times daily. Indications: Hypertension 270 Tab 3    atenolol (TENORMIN) 25 mg tablet Take 1 Tab by mouth two (2) times a day.  180 Tab 3    polyethylene glycol (MIRALAX) 17 gram packet Take 1 Packet by mouth daily. 14 Each 1    prenatal vit-iron fumarate-fa (PRENATAL PLUS WITH IRON) 28 mg iron- 800 mcg tab Take 1 Tab by mouth daily. 100 Tab 4    NIFEdipine, Bulk, powd 0.3 % compound, Apply as dircted 90 g 2    nitroglycerin (RECTIV) 0.4 % (w/w) ointment Insert  into rectum every twelve (12) hours every twelve (12) hours. 30 g 0    metFORMIN (GLUCOPHAGE) 1,000 mg tablet Take 1 Tab by mouth two (2) times daily (with meals). 60 Tab 1     Allergies   Allergen Reactions    Oxycodone Hives     Past Medical History:   Diagnosis Date    Diabetes ([facility]     External hemorrhoid, bleeding     Hypertension     Hyperthyroidism     PCOS (polycystic ovarian syndrome)      Past Surgical History:   Procedure Laterality Date    HX CHOLECYSTECTOMY      HX GI       Family History   Problem Relation Age of Onset    Hypertension Mother     Hypertension Father      Social History   Substance Use Topics    Smoking status: Never Smoker    Smokeless tobacco: Never Used    Alcohol use No       ROS   General ROS: negative for - chills, fatigue or fever  Psychological ROS: negative for - anxiety or depression  Ophthalmic ROS: negative for - blurry vision  ENT ROS: negative for - headaches  Respiratory ROS: no cough, shortness of breath, or wheezing  Cardiovascular ROS: no chest pain or dyspnea on exertion  Gastrointestinal ROS: no abdominal pain, change in bowel habits, or black or bloody stools  Genito-Urinary ROS: no dysuria, trouble voiding, or hematuria  Neurological ROS: negative for - numbness/tingling or weakness  Dermatological ROS: negative for - rash or skin lesion changes    All other systems reviewed and are negative.       Objective:Vitals:    11/22/17 1018   BP: 158/88   Pulse: 81   Resp: 16   Temp: 95.7 °F (35.4 °C)   TempSrc: Oral   SpO2: 96%   Weight: 314 lb (142.4 kg)   Height: 5' 8\" (1.727 m)   PainSc:   0 - No pain   LMP: 11/06/2017       General appearance  alert, cooperative, no distress, appears stated age; morbidly obese   Head  Normocephalic, without obvious abnormality, atraumatic   Eyes  conjunctivae/corneas clear. PERRL, EOM's intact. Ears  normal TM's and external ear canals AU   Nose Nares normal. Septum midline. Mucosa normal. No drainage or sinus tenderness. Throat Lips, mucosa, and tongue normal. Teeth and gums normal   Neck supple, symmetrical, trachea midline, no adenopathy, thyroid: not enlarged, symmetric, no tenderness/mass/nodules   Back   symmetric, no curvature. ROM normal. No CVA tenderness   Lungs   clear to auscultation bilaterally   Breasts  deferred   Heart  regular rate and rhythm, S1, S2 normal, no murmur, click, rub or gallop   Abdomen   soft, non-tender. Bowel sounds normal. No masses,  No organomegaly   Pelvic Deferred   Extremities extremities normal, atraumatic, no cyanosis or edema   Pulses 2+ and symmetric   Skin Skin color, texture, turgor normal. No rashes or lesions   Lymph nodes Cervical, supraclavicular, and axillary nodes normal.   Neurologic Normal       Assessment/Plan:    1. Annual physical exam  Reviewed preventive recommendations    2. Thyroid disease  Evaluate for therapeutic effect of thyroid treatment  - TSH 3RD GENERATION; Future    3. Morbid obesity with BMI of 45.0-49.9, adult ([facility]  I have reviewed/discussed the above normal BMI with the patient. I have recommended the following interventions: dietary management education, guidance, and counseling and encourage exercise . Emre Casanova - HEMOGLOBIN A1C WITH EAG; Future    4. Essential hypertension with goal blood pressure less than 130/85  Not at goal; Continue antihypertensives  - verapamil (CALAN) 80 mg tablet; Take 1 Tab by mouth three (3) times daily. Indications: hypertension  Dispense: 270 Tab; Refill: 3  - atenolol (TENORMIN) 25 mg tablet; Take 1 Tab by mouth two (2) times a day. Dispense: 180 Tab; Refill: 3    5. Hyperthyroidism  Continue treatment of symptoms  - atenolol (TENORMIN) 25 mg tablet;  Take 1 Tab by mouth two (2) times a day. Dispense: 180 Tab; Refill: 3    6. Refused influenza vaccine      7. Polycystic ovarian syndrome  Continue metformin      Lab review: orders written for new lab studies as appropriate; see orders      I have discussed the diagnosis with the patient and the intended plan as seen in the above orders. The patient has received an after-visit summary and questions were answered concerning future plans. I have discussed medication side effects and warnings with the patient as well. I have reviewed the plan of care with the patient, accepted their input and they are in agreement with the treatment goals. Follow-up Disposition:  Return in about 2 weeks (around 12/6/2017) for blood pressure and lab results.

## 2017-11-28 PROBLEM — E66.01 OBESITY, MORBID (HCC): Status: ACTIVE | Noted: 2017-11-28

## 2017-12-12 ENCOUNTER — DOCUMENTATION ONLY (OUTPATIENT)
Dept: FAMILY MEDICINE CLINIC | Age: 32
End: 2017-12-12

## 2017-12-12 NOTE — LETTER
12/12/2017 Dorothy Tia 
[address] [place] [id number] Dear Ms. Dorothy Weber, We had an appointment reserved for you 12/6/2017 and were concerned when you did not show or call within 24 hours to cancel the appointment. As mentioned in a previous letter to you, appointment time is limited and no-showed appointments may prevent another sick individual who needs to be seen from getting a preferred appointment time. Please note that a continued pattern of not showing for appointments may result in your inability to pre-book future appointments as well as possible discharge from the practice. Please call us at your earliest convenience to reschedule your appointment as your provider felt it was important to see you. Thank you for your anticipated cooperation. Sincerely, The scheduling staff [address] [place] [id number] [phone]

## 2017-12-20 ENCOUNTER — DOCUMENTATION ONLY (OUTPATIENT)
Dept: FAMILY MEDICINE CLINIC | Age: 32
End: 2017-12-20

## 2017-12-20 NOTE — PROGRESS NOTES
DOCUMENTATION ONLY: [facility] faxed over documentation that the patient was a \"no-show\" to the 12/12/2017 appointment @ 1:30pm. Documentation was sent to central scanning.

## 2018-01-18 ENCOUNTER — OFFICE VISIT (OUTPATIENT)
Dept: FAMILY MEDICINE CLINIC | Age: 33
End: 2018-01-18

## 2018-01-18 VITALS
HEART RATE: 73 BPM | SYSTOLIC BLOOD PRESSURE: 163 MMHG | OXYGEN SATURATION: 98 % | TEMPERATURE: 97.2 F | HEIGHT: 68 IN | DIASTOLIC BLOOD PRESSURE: 96 MMHG | WEIGHT: 293 LBS | BODY MASS INDEX: 44.41 KG/M2 | RESPIRATION RATE: 16 BRPM
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
## 2018-01-18 DIAGNOSIS — S39.012A STRAIN OF LUMBAR REGION, INITIAL ENCOUNTER: ICD-10-CM

## 2018-01-18 DIAGNOSIS — I10 ESSENTIAL HYPERTENSION WITH GOAL BLOOD PRESSURE LESS THAN 130/85: ICD-10-CM

## 2018-01-18 DIAGNOSIS — S16.1XXA STRAIN OF NECK MUSCLE, INITIAL ENCOUNTER: Primary | ICD-10-CM

## 2018-01-18 RX ORDER — CYCLOBENZAPRINE HCL 10 MG
10 TABLET ORAL
Qty: 30 TAB | Refills: 0 | Status: SHIPPED | OUTPATIENT
Start: 2018-01-18 | End: 2019-06-14
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
## 2018-01-18 NOTE — PATIENT INSTRUCTIONS
Neck Strain or Sprain: Rehab Exercises  Your Care Instructions  Here are some examples of typical rehabilitation exercises for your condition. Start each exercise slowly. Ease off the exercise if you start to have pain. Your doctor or physical therapist will tell you when you can start these exercises and which ones will work best for you. How to do the exercises  Neck rotation    1. Sit in a firm chair, or stand up straight. 2. Keeping your chin level, turn your head to the right, and hold for 15 to 30 seconds. 3. Turn your head to the left and hold for 15 to 30 seconds. 4. Repeat 2 to 4 times to each side. Neck stretches    1. Look straight ahead, and tip your right ear to your right shoulder. Do not let your left shoulder rise up as you tip your head to the right. 2. Hold for 15 to 30 seconds. 3. Tilt your head to the left. Do not let your right shoulder rise up as you tip your head to the left. 4. Hold for 15 to 30 seconds. 5. Repeat 2 to 4 times to each side. Forward neck flexion    1. Sit in a firm chair, or stand up straight. 2. Bend your head forward. 3. Hold for 15 to 30 seconds. 4. Repeat 2 to 4 times. Lateral (side) bend strengthening    1. With your right hand, place your first two fingers on your right temple. 2. Start to bend your head to the side while using gentle pressure from your fingers to keep your head from bending. 3. Hold for about 6 seconds. 4. Repeat 8 to 12 times. 5. Switch hands and repeat the same exercise on your left side. Forward bend strengthening    1. Place your first two fingers of either hand on your forehead. 2. Start to bend your head forward while using gentle pressure from your fingers to keep your head from bending. 3. Hold for about 6 seconds. 4. Repeat 8 to 12 times. Neutral position strengthening    1. Using one hand, place your fingertips on the back of your head at the top of your neck.   2. Start to bend your head backward while using gentle pressure from your fingers to keep your head from bending. 3. Hold for about 6 seconds. 4. Repeat 8 to 12 times. Chin tuck    1. Lie on the floor with a rolled-up towel under your neck. Your head should be touching the floor. 2. Slowly bring your chin toward your chest.  3. Hold for a count of 6, and then relax for up to 10 seconds. 4. Repeat 8 to 12 times. Follow-up care is a key part of your treatment and safety. Be sure to make and go to all appointments, and call your doctor if you are having problems. It's also a good idea to know your test results and keep a list of the medicines you take. Where can you learn more? Go to http://estelle-arnaldo.info/. Enter M679 in the search box to learn more about \"Neck Strain or Sprain: Rehab Exercises. \"  Current as of: March 21, 2017  Content Version: 11.4  © 9664-5074 [employer] Care instructions adapted under license by [employer] (which disclaims liability or warranty for this information). If you have questions about a medical condition or this instruction, always ask your healthcare professional. [address] any warranty or liability for your use of this information. Whiplash: Care Instructions  Your Care Instructions  Whiplash occurs when your head is suddenly forced forward and then snapped backward, as might happen in a car accident or sports injury. This can cause pain and stiffness in your neck. Your head, chest, shoulders, and arms also may hurt. Most whiplash gets better with home care. Your doctor may advise you to take medicine to relieve pain or relax your muscles. He or she may suggest exercise and physical therapy to increase flexibility and relieve pain. You can try wearing a neck (cervical) collar to support your neck. For a while you probably will need to avoid lifting and other activities that can strain the neck. Follow-up care is a key part of your treatment and safety. Be sure to make and go to all appointments, and call your doctor if you are having problems. It's also a good idea to know your test results and keep a list of the medicines you take. How can you care for yourself at home? · Take pain medicines exactly as directed. ¨ If the doctor gave you a prescription medicine for pain, take it as prescribed. ¨ If you are not taking a prescription pain medicine, ask your doctor if you can take an over-the-counter medicine. ¨ Do not take two or more pain medicines at the same time unless the doctor told you to. Many pain medicines have acetaminophen, which is Tylenol. Too much acetaminophen (Tylenol) can be harmful. · You can try using a soft foam collar to support your neck for short periods of time. You can buy one at most [employer]. Do not wear the collar more than 2 or 3 days unless your doctor tells you to. · You can try using heat and ice to see if it helps. ¨ Try using a heating pad on a low or medium setting for 15 to 20 minutes every 2 to 3 hours. Try a warm shower in place of one session with the heating pad. You can also buy single-use heat wraps that last up to 8 hours. ¨ You can also try an ice pack for 10 to 15 minutes every 2 to 3 hours. · Do not do anything that makes the pain worse. Take it easy for a couple of days. You can do your usual activities if they do not hurt your neck or put it at risk for more stress or injury. Avoid lifting, sports, or other activities that might strain your neck. · Try sleeping on a special neck pillow. Place it under your neck, not under your head. Placing a tightly rolled-up towel under your neck while you sleep will also work. If you use a neck pillow or rolled towel, do not use your regular pillow at the same time. · Once your neck pain is gone, do exercises to stretch your neck and back and make them stronger.  Your doctor or physical therapist can tell you which exercises are best.  When should you call for help?  Call 911 anytime you think you may need emergency care. For example, call if:  ? · You are unable to move an arm or a leg at all. ?Call your doctor now or seek immediate medical care if:  ? · You have new or worse symptoms in your arms, legs, chest, belly, or buttocks. Symptoms may include:  ¨ Numbness or tingling. ¨ Weakness. ¨ Pain. ? · You lose bladder or bowel control. ? Watch closely for changes in your health, and be sure to contact your doctor if:  ? · You are not getting better as expected. Where can you learn more? Go to http://estelle-arnaldo.info/. Enter B467 in the search box to learn more about \"Whiplash: Care Instructions. \"  Current as of: March 21, 2017  Content Version: 11.4  © 3712-1211 [employer]. Care instructions adapted under license by [employer] (which disclaims liability or warranty for this information). If you have questions about a medical condition or this instruction, always ask your healthcare professional. [address] any warranty or liability for your use of this information.
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
## 2018-01-18 NOTE — LETTER
NOTIFICATION RETURN TO WORK  
 
1/18/2018 3:19 PM 
 
Ms. Vanda Bauer 
[address] [place] [id number] To Whom It May Concern: 
 
Vanda Bauer is currently under the care of [address] [place] [address]. She will return to work on: 1/18/2018 If there are questions or concerns please have the patient contact our office. Sincerely, Leo Bolden MD

## 2018-01-23 ENCOUNTER — OFFICE VISIT (OUTPATIENT)
Dept: FAMILY MEDICINE CLINIC | Age: 33
End: 2018-01-23

## 2018-01-23 VITALS
TEMPERATURE: 98 F | HEART RATE: 95 BPM | SYSTOLIC BLOOD PRESSURE: 150 MMHG | BODY MASS INDEX: 44.41 KG/M2 | HEIGHT: 68 IN | WEIGHT: 293 LBS | OXYGEN SATURATION: 98 % | DIASTOLIC BLOOD PRESSURE: 90 MMHG | RESPIRATION RATE: 18 BRPM

## 2018-01-23 DIAGNOSIS — I10 ESSENTIAL HYPERTENSION WITH GOAL BLOOD PRESSURE LESS THAN 130/85: ICD-10-CM

## 2018-01-23 DIAGNOSIS — S39.012D STRAIN OF LUMBAR REGION, SUBSEQUENT ENCOUNTER: ICD-10-CM

## 2018-01-23 DIAGNOSIS — S16.1XXD STRAIN OF NECK MUSCLE, SUBSEQUENT ENCOUNTER: Primary | ICD-10-CM

## 2018-01-23 DIAGNOSIS — E66.01 MORBID OBESITY WITH BMI OF 45.0-49.9, ADULT (HCC): ICD-10-CM

## 2018-01-23 DIAGNOSIS — E05.90 HYPERTHYROIDISM: ICD-10-CM
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
## 2018-01-23 RX ORDER — PHENTERMINE HYDROCHLORIDE 37.5 MG/1
37.5 TABLET ORAL
Qty: 30 TAB | Refills: 0 | Status: SHIPPED | OUTPATIENT
Start: 2018-01-23 | End: 2018-02-12 | Stop reason: SDUPTHER

## 2018-01-23 RX ORDER — IBUPROFEN 800 MG/1
800 TABLET ORAL
Qty: 30 TAB | Refills: 0 | Status: SHIPPED | OUTPATIENT
Start: 2018-01-23 | End: 2019-07-23

## 2018-01-23 NOTE — PROGRESS NOTES
Judge Dudley is a 29 y.o. female  Chief Complaint   Patient presents with    Back Pain     1. Have you been to the ER, urgent care clinic since your last visit? Hospitalized since your last visit? No    2. Have you seen or consulted any other health care providers outside of the [address] [place] [address] since your last visit? Include any pap smears or colon screening.  No
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
## 2018-01-23 NOTE — PATIENT INSTRUCTIONS

## 2018-02-12 DIAGNOSIS — E66.01 MORBID OBESITY WITH BMI OF 45.0-49.9, ADULT (HCC): ICD-10-CM

## 2018-02-12 RX ORDER — PHENTERMINE HYDROCHLORIDE 37.5 MG/1
37.5 TABLET ORAL
Qty: 30 TAB | Refills: 0 | Status: SHIPPED | OUTPATIENT
Start: 2018-02-12 | End: 2018-02-27 | Stop reason: SDUPTHER

## 2018-02-27 ENCOUNTER — OFFICE VISIT (OUTPATIENT)
Dept: FAMILY MEDICINE CLINIC | Age: 33
End: 2018-02-27

## 2018-02-27 VITALS
OXYGEN SATURATION: 96 % | SYSTOLIC BLOOD PRESSURE: 150 MMHG | HEIGHT: 68 IN | DIASTOLIC BLOOD PRESSURE: 100 MMHG | RESPIRATION RATE: 16 BRPM | WEIGHT: 293 LBS | BODY MASS INDEX: 44.41 KG/M2 | HEART RATE: 72 BPM | TEMPERATURE: 98.3 F

## 2018-02-27 DIAGNOSIS — E66.01 MORBID OBESITY WITH BMI OF 45.0-49.9, ADULT (HCC): ICD-10-CM

## 2018-02-27 DIAGNOSIS — I10 ESSENTIAL HYPERTENSION WITH GOAL BLOOD PRESSURE LESS THAN 130/85: ICD-10-CM

## 2018-02-27 RX ORDER — PHENTERMINE HYDROCHLORIDE 37.5 MG/1
37.5 TABLET ORAL
Qty: 30 TAB | Refills: 0 | Status: SHIPPED | OUTPATIENT
Start: 2018-02-27 | End: 2018-04-12 | Stop reason: SDUPTHER

## 2018-02-27 RX ORDER — VERAPAMIL HYDROCHLORIDE 300 MG/1
300 CAPSULE, EXTENDED RELEASE ORAL
Qty: 90 CAP | Refills: 3 | Status: SHIPPED | OUTPATIENT
Start: 2018-02-27 | End: 2018-04-12 | Stop reason: SDUPTHER

## 2018-02-27 NOTE — MR AVS SNAPSHOT
[address] [place] [address] 
[phone] Patient: Aaron Mills MRN: [id number] VMR:2/37/8712 Visit Information Date & Time Provider Department Dept. Phone Encounter #  
 2/27/2018  1:30 PM Joni Roe, [facility] [phone] Follow-up Instructions Return in about 6 weeks (around 4/10/2018) for medication evaluation. Upcoming Health Maintenance Date Due DTaP/Tdap/Td series (1 - Tdap) 8/31/2008 Influenza Age 5 to Adult 8/1/2017 PAP AKA CERVICAL CYTOLOGY 7/27/2018 Allergies as of 2/27/2018  Review Complete On: 2/27/2018 By: Joni Roe MD  
  
 Severity Noted Reaction Type Reactions Oxycodone  10/27/2015    Hives Current Immunizations  Never Reviewed No immunizations on file. Not reviewed this visit You Were Diagnosed With   
  
 Codes Comments Morbid obesity with BMI of 45.0-49.9, adult (HCC)     ICD-10-CM: E66.01, Z68.42 
ICD-9-CM: 278.01, V85.42 Essential hypertension with goal blood pressure less than 130/85     ICD-10-CM: I10 
ICD-9-CM: 401.9 Vitals BP Pulse Temp Resp Height(growth percentile) Weight(growth percentile) (!) 150/100 (BP 1 Location: Left arm, BP Patient Position: Sitting) 72 98.3 °F (36.8 °C) (Oral) 16 5' 8\" (1.727 m) 312 lb (141.5 kg) SpO2 BMI OB Status Smoking Status 96% 47.44 kg/m2 Unknown Never Smoker Vitals History BMI and BSA Data Body Mass Index Body Surface Area  
 47.44 kg/m 2 2.61 m 2 Preferred Pharmacy Pharmacy Name Phone [facility], [address] [facility] [address] [facility] [phone] Your Updated Medication List  
  
   
This list is accurate as of 2/27/18  2:12 PM.  Always use your most recent med list.  
  
  
  
  
 atenolol 25 mg tablet Commonly known as:  TENORMIN  
 Take 1 Tab by mouth two (2) times a day. cyclobenzaprine 10 mg tablet Commonly known as:  FLEXERIL Take 1 Tab by mouth three (3) times daily as needed for Muscle Spasm(s). ibuprofen 800 mg tablet Commonly known as:  MOTRIN Take 1 Tab by mouth every eight (8) hours as needed for Pain.  
  
 metFORMIN  mg tablet Commonly known as:  GLUCOPHAGE XR Take 1 Tab by mouth daily (with dinner). NIFEdipine (Bulk) Powd  
0.3 % compound, Apply as dircted  
  
 nitroglycerin 0.4 % (w/w) ointment Commonly known as:  RECTIV Insert  into rectum every twelve (12) hours every twelve (12) hours. phentermine 37.5 mg tablet Commonly known as:  ADIPEX-P Take 1 Tab by mouth every morning. Max Daily Amount: 37.5 mg.  
  
 polyethylene glycol 17 gram packet Commonly known as:  [place] Linear Take 1 Packet by mouth daily. prenatal vit-iron fumarate-fa 28 mg iron- 800 mcg Tab Commonly known as:  PRENATAL PLUS with IRON Take 1 Tab by mouth daily. propylthiouracil 50 mg tablet Commonly known as:  PTU  
  
 verapamil  mg capsule Commonly known as:  VERELAN PM  
Take 1 Cap by mouth nightly. Prescriptions Printed Refills  
 phentermine (ADIPEX-P) 37.5 mg tablet 0 Sig: Take 1 Tab by mouth every morning. Max Daily Amount: 37.5 mg.  
 Class: Print Route: Oral  
  
Prescriptions Sent to Pharmacy Refills  
 verapamil ER (VERELAN PM) 300 mg capsule 3 Sig: Take 1 Cap by mouth nightly. Class: Normal  
 Pharmacy: [facility] [address] [place] [address], [address] [place] [address] #: [phone] Route: Oral  
  
We Performed the Following REFERRAL TO NUTRITION [REF50 Custom] Comments:  
 Please evaluate 30 y/o female patient for morbid obesity with BMI 47. Follow-up Instructions Return in about 6 weeks (around 4/10/2018) for medication evaluation. Referral Information Referral ID Referred By Referred To  
  
 [id number] Scarlett CHRISTINA Not Available Visits Status Start Date End Date 1 New Request 2/27/18 2/27/19 If your referral has a status of pending review or denied, additional information will be sent to support the outcome of this decision. Introducing [address]! Dear Meri Hill: Thank you for requesting a [employer] account. Our records indicate that you already have an active [employer] account. You can access your account anytime at [email or web address] Did you know that you can access your hospital and ER discharge instructions at any time in [employer]? You can also review all of your test results from your hospital stay or ER visit. Additional Information If you have questions, please visit the Frequently Asked Questions section of the [employer] website at [email or web address] Remember, [employer] is NOT to be used for urgent needs. For medical emergencies, dial 911. Now available from your iPhone and Android! Please provide this summary of care documentation to your next provider. Your primary care clinician is listed as Norma St. If you have any questions after today's visit, please call [phone].

## 2018-02-27 NOTE — PROGRESS NOTES
Vanda Bauer is a 29 y.o.  female and presents with    Chief Complaint   Patient presents with    Medication Evaluation     Subjective:  Mrs. Cortes Degree presents for f/u weight management. she is taking phentermine and denies palpitations or tremors; she has lost 12 lbs; she has decreased appetite. She is exercising and eating low calorie diet. She is not taking medications as prescribed for blood pressure. 3 times a day is difficult to remember and comply with.      Cardiovascular Review:  The patient has hypertension, obesity and prediabetes. Diet and Lifestyle: generally follows a low fat low cholesterol diet, generally follows a low sodium diet, follows a diabetic diet regularly, exercises sporadically  Home BP Monitoring: is not measured at home. Pertinent ROS: taking medications as instructed, no medication side effects noted, no TIA's, no chest pain on exertion, no dyspnea on exertion, no swelling of ankles.      Patient Active Problem List   Diagnosis Code    Hypertension I10    Prediabetes R73.03    Hyperthyroidism E05.90    Obesity, morbid ([facility] E66.01     Patient Active Problem List    Diagnosis Date Noted    [facility], [facility] 11/28/2017    Hyperthyroidism 02/23/2016    Hypertension 10/27/2015    Prediabetes 10/27/2015     Current Outpatient Prescriptions   Medication Sig Dispense Refill    phentermine (ADIPEX-P) 37.5 mg tablet Take 1 Tab by mouth every morning. Max Daily Amount: 37.5 mg. 30 Tab 0    ibuprofen (MOTRIN) 800 mg tablet Take 1 Tab by mouth every eight (8) hours as needed for Pain. 30 Tab 0    cyclobenzaprine (FLEXERIL) 10 mg tablet Take 1 Tab by mouth three (3) times daily as needed for Muscle Spasm(s). 30 Tab 0    verapamil (CALAN) 80 mg tablet Take 1 Tab by mouth three (3) times daily. Indications: hypertension 270 Tab 3    atenolol (TENORMIN) 25 mg tablet Take 1 Tab by mouth two (2) times a day.  180 Tab 3    metFORMIN ER (GLUCOPHAGE XR) 500 mg tablet Take 1 Tab by mouth daily (with dinner). 30 Tab 2    polyethylene glycol (MIRALAX) 17 gram packet Take 1 Packet by mouth daily. 14 Each 1    propylthiouracil (PTU) 50 mg tablet       prenatal vit-iron fumarate-fa (PRENATAL PLUS WITH IRON) 28 mg iron- 800 mcg tab Take 1 Tab by mouth daily. 100 Tab 4    NIFEdipine, Bulk, powd 0.3 % compound, Apply as dircted 90 g 2    nitroglycerin (RECTIV) 0.4 % (w/w) ointment Insert  into rectum every twelve (12) hours every twelve (12) hours. 30 g 0     Allergies   Allergen Reactions    Oxycodone Hives     Past Medical History:   Diagnosis Date    Diabetes ([facility] [address] [facility]     External hemorrhoid, bleeding     Hypertension     Hyperthyroidism     PCOS (polycystic ovarian syndrome)      Past Surgical History:   Procedure Laterality Date    HX CHOLECYSTECTOMY      HX GI       Family History   Problem Relation Age of Onset    Hypertension Mother     Hypertension Father      Social History   Substance Use Topics    Smoking status: Never Smoker    Smokeless tobacco: Never Used    Alcohol use No       ROS   General ROS: negative for - chills, fever or weight loss  Psychological ROS: negative for - anxiety or depression  Ophthalmic ROS: negative for - blurry vision  ENT ROS: negative for - headaches, nasal congestion or sore throat  Endocrine ROS: negative for - polydipsia/polyuria or temperature intolerance  Respiratory ROS: no cough, shortness of breath, or wheezing  Cardiovascular ROS: no chest pain or dyspnea on exertion  Gastrointestinal ROS: no abdominal pain, change in bowel habits, or black or bloody stools  Genito-Urinary ROS: no dysuria, trouble voiding, or hematuria  Musculoskeletal ROS: negative for - joint pain or muscle pain  Neurological ROS: negative for - numbness/tingling or weakness  Dermatological ROS: negative for - rash or skin lesion changes  All other systems reviewed and are negative.       Objective:  Vitals:    02/27/18 1347 02/27/18 1352   BP: (!) 174/100 (!) 178/124   Pulse: 72    Resp: 16    Temp: 98.3 °F (36.8 °C)    TempSrc: Oral    SpO2: 96%    Weight: 312 lb (141.5 kg)    Height: 5' 8\" (1.727 m)    PainSc:   0 - No pain      alert, well appearing, and in no distress, oriented to person, place, and time and morbidly obese  Mental status - normal mood, behavior, speech, dress, motor activity, and thought processes  Chest - clear to auscultation, no wheezes, rales or rhonchi, symmetric air entry  Heart - normal rate, regular rhythm, normal S1, S2, no murmurs, rubs, clicks or gallops    Assessment/Plan:    1. Morbid obesity with BMI of 45.0-49.9, adult ([facility]  I have reviewed/discussed the above normal BMI with the patient. I have recommended the following interventions: dietary management education, guidance, and counseling and encourage exercise . continue phentermine and refer to nutritionist.      - phentermine (ADIPEX-P) 37.5 mg tablet; Take 1 Tab by mouth every morning. Max Daily Amount: 37.5 mg.  Dispense: 30 Tab; Refill: 0  - REFERRAL TO NUTRITION    2. Essential hypertension with goal blood pressure less than 130/85  Change verapamil dose to once daily for better compliance  - verapamil ER (VERELAN PM) 300 mg capsule; Take 1 Cap by mouth nightly. Dispense: 90 Cap; Refill: 3      Lab review: no lab studies available for review at time of visit      I have discussed the diagnosis with the patient and the intended plan as seen in the above orders. The patient has received an after-visit summary and questions were answered concerning future plans. I have discussed medication side effects and warnings with the patient as well. I have reviewed the plan of care with the patient, accepted their input and they are in agreement with the treatment goals. Follow-up Disposition:  Return in about 6 weeks (around 4/10/2018) for medication evaluation.

## 2018-02-27 NOTE — PROGRESS NOTES
Johnna Cerna is a 29 y.o. female  Chief Complaint   Patient presents with    Medication Evaluation   1. Have you been to the ER, urgent care clinic since your last visit? Hospitalized since your last visit? No    2. Have you seen or consulted any other health care providers outside of the [address] [place] [address] since your last visit? Include any pap smears or colon screening.  No

## 2018-04-12 ENCOUNTER — OFFICE VISIT (OUTPATIENT)
Dept: FAMILY MEDICINE CLINIC | Age: 33
End: 2018-04-12

## 2018-04-12 VITALS
WEIGHT: 291 LBS | TEMPERATURE: 98 F | SYSTOLIC BLOOD PRESSURE: 140 MMHG | HEART RATE: 82 BPM | RESPIRATION RATE: 17 BRPM | BODY MASS INDEX: 44.1 KG/M2 | OXYGEN SATURATION: 97 % | HEIGHT: 68 IN | DIASTOLIC BLOOD PRESSURE: 98 MMHG

## 2018-04-12 DIAGNOSIS — I10 ESSENTIAL HYPERTENSION WITH GOAL BLOOD PRESSURE LESS THAN 130/85: ICD-10-CM

## 2018-04-12 DIAGNOSIS — E66.01 MORBID OBESITY WITH BMI OF 45.0-49.9, ADULT (HCC): ICD-10-CM

## 2018-04-12 RX ORDER — PHENTERMINE HYDROCHLORIDE 37.5 MG/1
37.5 TABLET ORAL
Qty: 30 TAB | Refills: 0 | Status: SHIPPED | OUTPATIENT
Start: 2018-04-12 | End: 2019-02-19 | Stop reason: SDUPTHER

## 2018-04-12 RX ORDER — VERAPAMIL HYDROCHLORIDE 300 MG/1
300 CAPSULE, EXTENDED RELEASE ORAL
Qty: 90 CAP | Refills: 3 | Status: SHIPPED | OUTPATIENT
Start: 2018-04-12 | End: 2019-04-13 | Stop reason: SDUPTHER

## 2018-04-12 NOTE — PROGRESS NOTES
Cheko Isaac is a 29 y.o.  female and presents with    Chief Complaint   Patient presents with    Weight Management           Subjective:  Mrs. Ellen Glover presents for f/u weight management. she is taking phentermine and denies palpitations or tremors; she has lost 12 lbs; she has decreased appetite. She is exercising and eating low calorie diet.     She is not taking medications as prescribed for blood pressure. 3 times a day is difficult to remember and comply with.      Cardiovascular Review:  The patient has hypertension, obesity and prediabetes. Diet and Lifestyle: generally follows a low fat low cholesterol diet, generally follows a low sodium diet, follows a diabetic diet regularly, exercises sporadically  Home BP Monitoring: is not measured at home. Pertinent ROS: taking medications as instructed, no medication side effects noted, no TIA's, no chest pain on exertion, no dyspnea on exertion, no swelling of ankles        Patient Active Problem List   Diagnosis Code    Hypertension I10    Prediabetes R73.03    Hyperthyroidism E05.90    Obesity, morbid ([facility] E66.01     Patient Active Problem List    Diagnosis Date Noted    [facility], [facility] ([facility] 11/28/2017    Hyperthyroidism 02/23/2016    Hypertension 10/27/2015    Prediabetes 10/27/2015     Current Outpatient Prescriptions   Medication Sig Dispense Refill    phentermine (ADIPEX-P) 37.5 mg tablet Take 1 Tab by mouth every morning. Max Daily Amount: 37.5 mg. 30 Tab 0    verapamil ER (VERELAN PM) 300 mg capsule Take 1 Cap by mouth nightly. 90 Cap 3    ibuprofen (MOTRIN) 800 mg tablet Take 1 Tab by mouth every eight (8) hours as needed for Pain. 30 Tab 0    cyclobenzaprine (FLEXERIL) 10 mg tablet Take 1 Tab by mouth three (3) times daily as needed for Muscle Spasm(s). 30 Tab 0    atenolol (TENORMIN) 25 mg tablet Take 1 Tab by mouth two (2) times a day.  180 Tab 3    metFORMIN ER (GLUCOPHAGE XR) 500 mg tablet Take 1 Tab by mouth daily (with dinner). 30 Tab 2    polyethylene glycol (MIRALAX) 17 gram packet Take 1 Packet by mouth daily. 14 Each 1    propylthiouracil (PTU) 50 mg tablet       prenatal vit-iron fumarate-fa (PRENATAL PLUS WITH IRON) 28 mg iron- 800 mcg tab Take 1 Tab by mouth daily. 100 Tab 4    NIFEdipine, Bulk, powd 0.3 % compound, Apply as dircted 90 g 2    nitroglycerin (RECTIV) 0.4 % (w/w) ointment Insert  into rectum every twelve (12) hours every twelve (12) hours. 30 g 0     Allergies   Allergen Reactions    Oxycodone Hives     Past Medical History:   Diagnosis Date    Diabetes ([facility]     External hemorrhoid, bleeding     Hypertension     Hyperthyroidism     PCOS (polycystic ovarian syndrome)      Past Surgical History:   Procedure Laterality Date    HX CHOLECYSTECTOMY      HX GI       Family History   Problem Relation Age of Onset    Hypertension Mother     Hypertension Father      Social History   Substance Use Topics    Smoking status: Never Smoker    Smokeless tobacco: Never Used    Alcohol use No       ROS   General ROS: negative for - chills, fever or weight loss  Psychological ROS: negative for - anxiety or depression  Ophthalmic ROS: negative for - blurry vision  ENT ROS: negative for - headaches, nasal congestion or sore throat  Endocrine ROS: negative for - polydipsia/polyuria or temperature intolerance  Respiratory ROS: no cough, shortness of breath, or wheezing  Cardiovascular ROS: no chest pain or dyspnea on exertion  Gastrointestinal ROS: no abdominal pain, change in bowel habits, or black or bloody stools  Genito-Urinary ROS: no dysuria, trouble voiding, or hematuria  Musculoskeletal ROS: negative for - joint pain or muscle pain  Neurological ROS: negative for - numbness/tingling or weakness  Dermatological ROS: negative for - rash or skin lesion changes    All other systems reviewed and are negative.       Objective:  Vitals:    04/12/18 1540   BP: (!) 140/98   Pulse: 82   Resp: 17 Temp: 98 °F (36.7 °C)   TempSrc: Oral   SpO2: 97%   Weight: 291 lb (132 kg)   Height: 5' 8\" (1.727 m)   PainSc:   0 - No pain       alert, well appearing, and in no distress, oriented to person, place, and time and morbidly obese  Mental status - normal mood, behavior, speech, dress, motor activity, and thought processes  Chest - clear to auscultation, no wheezes, rales or rhonchi, symmetric air entry  Heart - normal rate, regular rhythm, normal S1, S2, no murmurs, rubs, clicks or gallops  Neurological - cranial nerves II through XII intact, normal muscle tone, no tremors, strength 5/5    Assessment/Plan:    1. Essential hypertension with goal blood pressure less than 130/85  Increase dose for better control  - verapamil ER (VERELAN PM) 300 mg capsule; Take 1 Cap by mouth nightly. Dispense: 90 Cap; Refill: 3    2. Morbid obesity with BMI of 45.0-49.9, adult ([facility]  I have reviewed/discussed the above normal BMI with the patient. I have recommended the following interventions: dietary management education, guidance, and counseling and encourage exercise . .      - phentermine (ADIPEX-P) 37.5 mg tablet; Take 1 Tab by mouth every morning. Max Daily Amount: 37.5 mg.  Dispense: 30 Tab; Refill: 0    Lab review: no lab studies available for review at time of visit      I have discussed the diagnosis with the patient and the intended plan as seen in the above orders. The patient has received an after-visit summary and questions were answered concerning future plans. I have discussed medication side effects and warnings with the patient as well. I have reviewed the plan of care with the patient, accepted their input and they are in agreement with the treatment goals. Follow-up Disposition:  Return in about 30 days (around 5/12/2018) for medication evaluation.

## 2018-04-12 NOTE — MR AVS SNAPSHOT
[facility] 
 
 
 [address] [facility] [address] [place] [address] [phone] 
[phone] Patient: Tico Camejo MRN: [id number] Visit Information Date & Time Provider Department Dept. Phone Encounter #  
 4/12/2018  3:30 PM Beth Egan, [address] [facility] [phone] [id number] Follow-up Instructions Return in about 30 days (around 5/12/2018) for medication evaluation. Upcoming Health Maintenance Date Due DTaP/Tdap/Td series (1 - Tdap) 8/31/2008 Influenza Age 5 to Adult 8/1/2017 PAP AKA CERVICAL CYTOLOGY 7/27/2018 Allergies as of 4/12/2018  Review Complete On: 2/27/2018 By: Beth Egan MD  
  
 Severity Noted Reaction Type Reactions Oxycodone  10/27/2015    Hives Current Immunizations  Never Reviewed No immunizations on file. Not reviewed this visit You Were Diagnosed With   
  
 Codes Comments Essential hypertension with goal blood pressure less than 130/85     ICD-10-CM: I10 
ICD-9-CM: 401.9 Morbid obesity with BMI of 45.0-49.9, adult (HCC)     ICD-10-CM: E66.01, Z68.42 
ICD-9-CM: 278.01, V85.42 Vitals BP Pulse Temp Resp Height(growth percentile) Weight(growth percentile) (!) 140/98 (BP 1 Location: Left arm, BP Patient Position: Sitting) 82 98 °F (36.7 °C) (Oral) 17 5' 8\" (1.727 m) 291 lb (132 kg) SpO2 BMI OB Status Smoking Status 97% 44.25 kg/m2 Unknown Never Smoker Vitals History BMI and BSA Data Body Mass Index Body Surface Area  
 44.25 kg/m 2 2.52 m 2 Preferred Pharmacy Pharmacy Name Phone [place], [address] [facility] [phone] Your Updated Medication List  
  
   
This list is accurate as of 4/12/18  3:53 PM.  Always use your most recent med list.  
  
  
  
  
 atenolol 25 mg tablet Commonly known as:  TENORMIN  
 Take 1 Tab by mouth two (2) times a day. cyclobenzaprine 10 mg tablet Commonly known as:  FLEXERIL Take 1 Tab by mouth three (3) times daily as needed for Muscle Spasm(s). ibuprofen 800 mg tablet Commonly known as:  MOTRIN Take 1 Tab by mouth every eight (8) hours as needed for Pain.  
  
 metFORMIN  mg tablet Commonly known as:  GLUCOPHAGE XR Take 1 Tab by mouth daily (with dinner). NIFEdipine (Bulk) Powd  
0.3 % compound, Apply as dircted  
  
 nitroglycerin 0.4 % (w/w) ointment Commonly known as:  RECTIV Insert  into rectum every twelve (12) hours every twelve (12) hours. phentermine 37.5 mg tablet Commonly known as:  ADIPEX-P Take 1 Tab by mouth every morning. Max Daily Amount: 37.5 mg.  
  
 polyethylene glycol 17 gram packet Commonly known as:  Vernel Redder Take 1 Packet by mouth daily. prenatal vit-iron fumarate-fa 28 mg iron- 800 mcg Tab Commonly known as:  PRENATAL PLUS with IRON Take 1 Tab by mouth daily. propylthiouracil 50 mg tablet Commonly known as:  PTU  
  
 verapamil  mg capsule Commonly known as:  VERELAN PM  
Take 1 Cap by mouth nightly. Prescriptions Printed Refills  
 phentermine (ADIPEX-P) 37.5 mg tablet 0 Sig: Take 1 Tab by mouth every morning. Max Daily Amount: 37.5 mg.  
 Class: Print Route: Oral  
  
Prescriptions Sent to Pharmacy Refills  
 verapamil ER (VERELAN PM) 300 mg capsule 3 Sig: Take 1 Cap by mouth nightly. Class: Normal  
 Pharmacy: [id number] [address] [place] [address], [address] [place] [address] #: [phone] Route: Oral  
  
Follow-up Instructions Return in about 30 days (around 5/12/2018) for medication evaluation. Introducing [facility]! Dear Jorge Urbano: Thank you for requesting a [employer] account. Our records indicate that you already have an active [employer] account.   You can access your account anytime at [email or web address] [email or web address] Doctor Did you know that you can access your hospital and ER discharge instructions at any time in [employer]? You can also review all of your test results from your hospital stay or ER visit. Additional Information If you have questions, please visit the Frequently Asked Questions section of the [employer] website at [email or web address] Remember, [employer] is NOT to be used for urgent needs. For medical emergencies, dial 911. Now available from your iPhone and Android! Please provide this summary of care documentation to your next provider. Your primary care clinician is listed as Ema Santos. If you have any questions after today's visit, please call [phone].

## 2018-04-12 NOTE — PROGRESS NOTES
Ms. Jae Pena is a 29 y.o patient that is present in the office for a medication evaluation        1. Have you been to the ER, urgent care clinic since your last visit? Hospitalized since your last visit? No    2. Have you seen or consulted any other health care providers outside of the [address] [place] [address] since your last visit? Include any pap smears or colon screening.  No    Health Maintenance Due   Topic Date Due    DTaP/Tdap/Td series (1 - Tdap) 08/31/2008    Influenza Age 5 to Adult  08/01/2017    PAP AKA CERVICAL CYTOLOGY  07/27/2018

## 2018-12-06 DIAGNOSIS — I10 ESSENTIAL HYPERTENSION WITH GOAL BLOOD PRESSURE LESS THAN 130/85: ICD-10-CM

## 2018-12-06 RX ORDER — VERAPAMIL HYDROCHLORIDE 300 MG/1
300 CAPSULE, EXTENDED RELEASE ORAL
Qty: 90 CAP | Refills: 3 | Status: CANCELLED | OUTPATIENT
Start: 2018-12-06

## 2018-12-06 NOTE — TELEPHONE ENCOUNTER
Please see medication refill request. Verapamil Er has refills at the pharmacy. Please advise.  Thank you

## 2018-12-07 RX ORDER — METFORMIN HYDROCHLORIDE 500 MG/1
500 TABLET, EXTENDED RELEASE ORAL
Qty: 30 TAB | Refills: 2 | Status: SHIPPED | OUTPATIENT
Start: 2018-12-07 | End: 2019-03-16 | Stop reason: SDUPTHER

## 2019-02-19 ENCOUNTER — OFFICE VISIT (OUTPATIENT)
Dept: FAMILY MEDICINE CLINIC | Age: 34
End: 2019-02-19

## 2019-02-19 VITALS
HEIGHT: 68 IN | SYSTOLIC BLOOD PRESSURE: 130 MMHG | DIASTOLIC BLOOD PRESSURE: 82 MMHG | RESPIRATION RATE: 16 BRPM | TEMPERATURE: 98.6 F | WEIGHT: 293 LBS | OXYGEN SATURATION: 97 % | HEART RATE: 84 BPM | BODY MASS INDEX: 44.41 KG/M2

## 2019-02-19 DIAGNOSIS — E66.01 MORBID OBESITY WITH BMI OF 45.0-49.9, ADULT (HCC): ICD-10-CM

## 2019-02-19 DIAGNOSIS — I10 ESSENTIAL HYPERTENSION WITH GOAL BLOOD PRESSURE LESS THAN 130/85: ICD-10-CM

## 2019-02-19 DIAGNOSIS — E05.90 HYPERTHYROIDISM: ICD-10-CM

## 2019-02-19 DIAGNOSIS — Z00.00 ANNUAL PHYSICAL EXAM: Primary | ICD-10-CM

## 2019-02-19 RX ORDER — PHENTERMINE HYDROCHLORIDE 37.5 MG/1
37.5 TABLET ORAL
Qty: 30 TAB | Refills: 0 | Status: SHIPPED | OUTPATIENT
Start: 2019-02-19 | End: 2019-03-19 | Stop reason: SDUPTHER

## 2019-02-19 RX ORDER — ATENOLOL 25 MG/1
25 TABLET ORAL 2 TIMES DAILY
Qty: 180 TAB | Refills: 3 | Status: SHIPPED | OUTPATIENT
Start: 2019-02-19 | End: 2019-02-19 | Stop reason: ALTCHOICE

## 2019-02-19 NOTE — PATIENT INSTRUCTIONS
Learning About Bariatric Surgery  What is bariatric surgery? Bariatric surgery is surgery to help you lose weight. This type of surgery is only used for people who are very overweight and have not been able to lose weight with diet and exercise. This surgery makes the stomach smaller. Some types of surgery also change the connection between your stomach and intestines. How is bariatric surgery done? Bariatric surgery may be either \"open\" or \"laparoscopic. \" Open surgery is done through a large cut (incision) in the belly. Laparoscopic surgery is done through several small cuts. The doctor puts a lighted tube, or scope, and other surgical tools through small cuts in your belly. The doctor is able to see your organs with the scope. There are different types of bariatric surgery. Gastric sleeve surgery  The surgery is usually done through several small incisions in the belly. The doctor removes more than half of your stomach. This leaves a thin sleeve, or tube, that is about the size of a banana. Because part of your stomach has been removed, this can't be reversed. Wendi-en-Y gastric bypass surgery  Wendi-en-Y (say \"kelvin-en-why\") surgery changes the connection between the stomach and the intestines. The doctor separates a section of your stomach from the rest of your stomach. This makes a small pouch. The new pouch will hold the food you eat. The doctor connects the stomach pouch to the middle part of the small intestine. Gastric banding surgery  The surgery is usually done through several small incisions in the belly. The doctor wraps a band around the upper part of the stomach. This creates a small pouch. The small size of the pouch means that you will get full after you eat just a small amount of food. The doctor can inflate or deflate the band to adjust the size. This lets the doctor adjust how quickly food passes from the new pouch into the stomach.  It does not change the connection between the stomach and the intestines. What can you expect after the surgery? You may stay in the hospital for one or more days after the surgery. How long you stay depends on the type of surgery you had. Most people need 2 to 4 weeks before they are ready to get back to their usual routine. For the first 2 to 6 weeks after surgery, you probably will need to follow a liquid or soft diet. Bit by bit, you will be able to eat more solid foods. Your doctor may advise you to work with a dietitian. This way you'll be sure to get enough protein, vitamins, and minerals while you are losing weight. Even with a healthy diet, you may need to take vitamin and mineral supplements. After surgery, you will not be able to eat very much at one time. You will get full quickly. Try not to eat too much at one time or eat foods that are high in fat or sugar. If you do, you may vomit, get stomach pain, or have diarrhea. You probably will lose weight very quickly in the first few months after surgery. As time goes on, your weight loss will slow down. You will have regular doctor visits to check how you are doing. Think of bariatric surgery as a tool to help you lose weight. It isn't an instant fix. You will still need to eat a healthy diet and get regular exercise. This will help you reach your weight goal and avoid regaining the weight you lose. Follow-up care is a key part of your treatment and safety. Be sure to make and go to all appointments, and call your doctor if you are having problems. It's also a good idea to know your test results and keep a list of the medicines you take. Where can you learn more? Go to http://estelle-arnaldo.info/. Enter G469 in the search box to learn more about \"Learning About Bariatric Surgery. \"  Current as of: June 25, 2018  Content Version: 11.9  © 5306-5794 [employer].  Care instructions adapted under license by [employer] (which disclaims liability or warranty for this information). If you have questions about a medical condition or this instruction, always ask your healthcare professional. [place] [address] any warranty or liability for your use of this information.

## 2019-02-19 NOTE — PROGRESS NOTES
Barbara Beltrán is a 34 y.o.  female and presents with    Chief Complaint   Patient presents with    Complete Physical    Weight Management     Subjective: Well Adult Physical   Patient here for a comprehensive physical exam.The patient reports problems - morbid obesity,  Do you take any herbs or supplements that were not prescribed by a doctor? no Are you taking calcium supplements? no Are you taking aspirin daily? not applicable    Mrs. Luiz Boyikn presents for f/u weight management. she is taking phentermine and denies palpitations or tremors; she has lost 12 lbs; she has decreased appetite.  She is exercising and eating low calorie diet.     She is not taking medications as prescribed for blood pressure.  3 times a day is difficult to remember and comply with.      Cardiovascular Review:  The patient has hypertension, obesity and prediabetes. Diet and Lifestyle: generally follows a low fat low cholesterol diet, generally follows a low sodium diet, follows a diabetic diet regularly, exercises sporadically  Home BP Monitoring: is not measured at home. Pertinent ROS: not taking medications as instructed, no medication side effects noted, no TIA's, no chest pain on exertion, no dyspnea on exertion, no swelling of ankles    Patient Active Problem List   Diagnosis Code    Hypertension I10    Prediabetes R73.03    Hyperthyroidism E05.90    Obesity, morbid ([facility] E66.01     Patient Active Problem List    Diagnosis Date Noted    Obesity, morbid ([facility] 11/28/2017    Hyperthyroidism 02/23/2016    Hypertension 10/27/2015    Prediabetes 10/27/2015     Current Outpatient Medications   Medication Sig Dispense Refill    metFORMIN ER (GLUCOPHAGE XR) 500 mg tablet Take 1 Tab by mouth daily (with dinner). 30 Tab 2    verapamil ER (VERELAN PM) 300 mg capsule Take 1 Cap by mouth nightly. 90 Cap 3    phentermine (ADIPEX-P) 37.5 mg tablet Take 1 Tab by mouth every morning.  Max Daily Amount: 37.5 mg. 30 Tab 0    ibuprofen (MOTRIN) 800 mg tablet Take 1 Tab by mouth every eight (8) hours as needed for Pain. 30 Tab 0    cyclobenzaprine (FLEXERIL) 10 mg tablet Take 1 Tab by mouth three (3) times daily as needed for Muscle Spasm(s). 30 Tab 0    atenolol (TENORMIN) 25 mg tablet Take 1 Tab by mouth two (2) times a day. 180 Tab 3    polyethylene glycol (MIRALAX) 17 gram packet Take 1 Packet by mouth daily. 14 Each 1    propylthiouracil (PTU) 50 mg tablet       prenatal vit-iron fumarate-fa (PRENATAL PLUS WITH IRON) 28 mg iron- 800 mcg tab Take 1 Tab by mouth daily. 100 Tab 4    NIFEdipine, Bulk, powd 0.3 % compound, Apply as dircted 90 g 2    nitroglycerin (RECTIV) 0.4 % (w/w) ointment Insert  into rectum every twelve (12) hours every twelve (12) hours.  30 g 0     Allergies   Allergen Reactions    Oxycodone Hives     Past Medical History:   Diagnosis Date    Diabetes ([facility]     External hemorrhoid, bleeding     Hypertension     Hyperthyroidism     PCOS (polycystic ovarian syndrome)      Past Surgical History:   Procedure Laterality Date    HX CHOLECYSTECTOMY      HX GI       Family History   Problem Relation Age of Onset    Hypertension Mother     Hypertension Father      Social History     Tobacco Use    Smoking status: Never Smoker    Smokeless tobacco: Never Used   Substance Use Topics    Alcohol use: No       ROS   General ROS: negative for - chills, fever or weight loss  Psychological ROS: negative for - anxiety or depression  Ophthalmic ROS: negative for - blurry vision  ENT ROS: negative for - headaches, nasal congestion or sore throat  Endocrine ROS: negative for - polydipsia/polyuria or temperature intolerance  Respiratory ROS: no cough, shortness of breath, or wheezing  Cardiovascular ROS: no chest pain or dyspnea on exertion  Gastrointestinal ROS: no abdominal pain, change in bowel habits, or black or bloody stools  Genito-Urinary ROS: no dysuria, trouble voiding, or hematuria  Musculoskeletal ROS: negative for - joint pain or muscle pain  Neurological ROS: negative for - numbness/tingling or weakness  Dermatological ROS: negative for - rash or skin lesion changes    All other systems reviewed and are negative. Objective:  Vitals:    02/19/19 1138   BP: 130/82   Pulse: 84   Resp: 16   Temp: 98.6 °F (37 °C)   TempSrc: Oral   SpO2: 97%   Weight: 310 lb (140.6 kg)   Height: 5' 8\" (1.727 m)   PainSc:   0 - No pain   LMP: 01/28/2019       General appearance  alert, cooperative, no distress, appears stated age   Head  Normocephalic, without obvious abnormality, atraumatic   Eyes  conjunctivae/corneas clear. PERRL, EOM's intact. Ears  normal TM's and external ear canals AU   Nose Nares normal. Septum midline. Mucosa normal. No drainage or sinus tenderness. Throat Lips, mucosa, and tongue normal. Teeth and gums normal   Neck supple, symmetrical, trachea midline, no adenopathy, thyroid: not enlarged, symmetric, no tenderness/mass/nodules   Back   symmetric, no curvature. ROM normal.   Lungs   clear to auscultation bilaterally   Breasts  no masses, tenderness   Heart  regular rate and rhythm, S1, S2 normal, no murmur, click, rub or gallop   Abdomen   soft, non-tender. Bowel sounds normal. No masses,  No organomegaly   Pelvic Deferred   Extremities extremities normal, atraumatic, no cyanosis or edema   Pulses 2+ and symmetric   Skin Skin color, texture, turgor normal. No rashes or lesions   Lymph nodes Cervical, supraclavicular, and axillary nodes normal.   Neurologic Normal     Assessment/Plan:    1. Hyperthyroidism  F/u with endocrinology; symptoms have improved    2. Essential hypertension with goal blood pressure less than 130/85  Borderline controlled; encourage compliance with low salt diet and exercise    3. Morbid obesity with BMI of 45.0-49.9, adult ([facility]  I have reviewed/discussed the above normal BMI with the patient.   I have recommended the following interventions: dietary management education, guidance, and counseling and encourage exercise . .      - phentermine (ADIPEX-P) 37.5 mg tablet; Take 1 Tab by mouth every morning. Max Daily Amount: 37.5 mg.  Dispense: 30 Tab; Refill: 0  - REFERRAL TO BARIATRIC SURGERY    4. Annual physical exam -  Reviewed preventive recommendation    Lab review: no lab studies available for review at time of visit      I have discussed the diagnosis with the patient and the intended plan as seen in the above orders. The patient has received an after-visit summary and questions were answered concerning future plans. I have discussed medication side effects and warnings with the patient as well. I have reviewed the plan of care with the patient, accepted their input and they are in agreement with the treatment goals. Follow-up Disposition:  Return in about 30 days (around 3/21/2019) for weight management.

## 2019-02-19 NOTE — PROGRESS NOTES
Chief Complaint   Patient presents with    Complete Physical    Weight Management     1. Have you been to the ER, urgent care clinic since your last visit? Hospitalized since your last visit? No    2. Have you seen or consulted any other health care providers outside of the [facility] since your last visit? Include any pap smears or colon screening.  No

## 2019-03-18 RX ORDER — METFORMIN HYDROCHLORIDE 500 MG/1
TABLET, EXTENDED RELEASE ORAL
Qty: 30 TAB | Refills: 0 | Status: SHIPPED | OUTPATIENT
Start: 2019-03-18 | End: 2019-04-13 | Stop reason: SDUPTHER

## 2019-03-19 ENCOUNTER — OFFICE VISIT (OUTPATIENT)
Dept: FAMILY MEDICINE CLINIC | Age: 34
End: 2019-03-19

## 2019-03-19 VITALS
WEIGHT: 293 LBS | SYSTOLIC BLOOD PRESSURE: 140 MMHG | BODY MASS INDEX: 44.41 KG/M2 | RESPIRATION RATE: 16 BRPM | OXYGEN SATURATION: 97 % | DIASTOLIC BLOOD PRESSURE: 90 MMHG | HEIGHT: 68 IN | TEMPERATURE: 97.7 F | HEART RATE: 73 BPM

## 2019-03-19 DIAGNOSIS — K59.01 SLOW TRANSIT CONSTIPATION: Primary | ICD-10-CM

## 2019-03-19 DIAGNOSIS — I10 ESSENTIAL HYPERTENSION WITH GOAL BLOOD PRESSURE LESS THAN 130/85: ICD-10-CM

## 2019-03-19 DIAGNOSIS — E66.01 MORBID OBESITY WITH BMI OF 45.0-49.9, ADULT (HCC): ICD-10-CM

## 2019-03-19 RX ORDER — PHENTERMINE HYDROCHLORIDE 37.5 MG/1
37.5 TABLET ORAL
Qty: 30 TAB | Refills: 0 | Status: SHIPPED | OUTPATIENT
Start: 2019-04-18 | End: 2019-05-21 | Stop reason: SDUPTHER

## 2019-03-19 RX ORDER — PHENTERMINE HYDROCHLORIDE 37.5 MG/1
37.5 TABLET ORAL
Qty: 30 TAB | Refills: 0 | Status: SHIPPED | OUTPATIENT
Start: 2019-03-19 | End: 2019-05-21 | Stop reason: SDUPTHER

## 2019-03-19 NOTE — PROGRESS NOTES
Barbara Beltrán is a 34 y.o.  female and presents with    Chief Complaint   Patient presents with    Constipation     2 weeks     Medication Evaluation     Subjective:  Constipation  Mrs. Jaimes complains of constipation. Stool pattern has been 1 firm stool(s) every 2-3 days. Onset was 1 week ago Defecation has been difficult. Co-Morbid conditions:obesity. Symptoms have been symptoms have progressed to a point and plateaued. . Current Health Habits: Eating fiber? yes, amt 2-3 servings daily Exercise?yes - 4 times per week; Water intake? yes, amt 3-4 bottles Current OTC/RX therapy has been none currently but has used miralax in the past.  which has been somewhat effective. She is also here for f/u weight management. she is taking phentermine and denies palpitations or tremors; she has lost 12 lbs; she has decreased appetite.  She is exercising and eating low calorie diet.     She is not taking medications as prescribed for blood pressure.  3 times a day is difficult to remember and comply with.      Cardiovascular Review:  The patient has hypertension, obesity and prediabetes. Diet and Lifestyle: generally follows a low fat low cholesterol diet, generally follows a low sodium diet, follows a diabetic diet regularly, exercises sporadically  Home BP Monitoring: is not measured at home.   Pertinent ROS: not taking medications as instructed, no medication side effects noted, no TIA's, no chest pain on exertion, no dyspnea on exertion, no swelling of ankles    ROS   General ROS: negative for - chills, fever; + weight loss  Psychological ROS: negative for - anxiety or depression  Ophthalmic ROS: negative for - blurry vision  ENT ROS: negative for - headaches, nasal congestion or sore throat  Endocrine ROS: negative for - polydipsia/polyuria or temperature intolerance  Respiratory ROS: no cough, shortness of breath, or wheezing  Cardiovascular ROS: no chest pain or dyspnea on exertion  Genito-Urinary ROS: no dysuria, trouble voiding, or hematuria  Musculoskeletal ROS: negative for - joint pain or muscle pain  Neurological ROS: negative for - numbness/tingling or weakness  Dermatological ROS: negative for - rash or skin lesion changes    All other systems reviewed and are negative. Objective:  Vitals:    03/19/19 1307   BP: 140/90   Pulse: 73   Resp: 16   Temp: 97.7 °F (36.5 °C)   TempSrc: Oral   SpO2: 97%   Weight: 295 lb (133.8 kg)   Height: 5' 8\" (1.727 m)   PainSc:   0 - No pain       alert, well appearing, and in no distress, oriented to person, place, and time and morbidly obese  Mental status - normal mood, behavior, speech, dress, motor activity, and thought processes  Chest - clear to auscultation, no wheezes, rales or rhonchi, symmetric air entry  Heart - normal rate, regular rhythm, normal S1, S2, no murmurs, rubs, clicks or gallops  Abdomen - soft, nontender, nondistended, no masses or organomegally    Assessment/Plan:    1. Morbid obesity with BMI of 45.0-49.9, adult ([facility]  I have reviewed/discussed the above normal BMI with the patient. I have recommended the following interventions: dietary management education, guidance, and counseling and encourage exercise . continue medication to assist.      - phentermine (ADIPEX-P) 37.5 mg tablet; Take 1 Tab by mouth every morning. Max Daily Amount: 37.5 mg.  Dispense: 30 Tab; Refill: 0  - phentermine (ADIPEX-P) 37.5 mg tablet; Take 1 Tab by mouth every morning. Max Daily Amount: 37.5 mg.  Dispense: 30 Tab; Refill: 0    2. Slow transit constipation  Increase fluids, fiber and exercise    3. Essential hypertension with goal blood pressure less than 130/85      Lab review: no lab studies available for review at time of visit      I have discussed the diagnosis with the patient and the intended plan as seen in the above orders. The patient has received an after-visit summary and questions were answered concerning future plans.   I have discussed medication side effects and warnings with the patient as well. I have reviewed the plan of care with the patient, accepted their input and they are in agreement with the treatment goals. Follow-up and Dispositions    · Return in about 60 days (around 5/18/2019) for medication evaluation.

## 2019-03-19 NOTE — PROGRESS NOTES
Chief Complaint   Patient presents with    Constipation     2 weeks     Medication Evaluation     1. Have you been to the ER, urgent care clinic since your last visit? Hospitalized since your last visit? No    2. Have you seen or consulted any other health care providers outside of the [facility] since your last visit? Include any pap smears or colon screening.  No

## 2019-05-21 ENCOUNTER — OFFICE VISIT (OUTPATIENT)
Dept: FAMILY MEDICINE CLINIC | Age: 34
End: 2019-05-21

## 2019-05-21 VITALS
RESPIRATION RATE: 16 BRPM | OXYGEN SATURATION: 99 % | WEIGHT: 276.4 LBS | SYSTOLIC BLOOD PRESSURE: 138 MMHG | HEIGHT: 68 IN | TEMPERATURE: 98.8 F | BODY MASS INDEX: 41.89 KG/M2 | DIASTOLIC BLOOD PRESSURE: 94 MMHG | HEART RATE: 87 BPM

## 2019-05-21 DIAGNOSIS — E66.01 MORBID OBESITY WITH BMI OF 45.0-49.9, ADULT (HCC): ICD-10-CM

## 2019-05-21 DIAGNOSIS — I10 ESSENTIAL HYPERTENSION WITH GOAL BLOOD PRESSURE LESS THAN 130/85: ICD-10-CM

## 2019-05-21 DIAGNOSIS — K59.01 SLOW TRANSIT CONSTIPATION: Primary | ICD-10-CM

## 2019-05-21 DIAGNOSIS — E05.90 HYPERTHYROIDISM: ICD-10-CM

## 2019-05-21 RX ORDER — PHENTERMINE HYDROCHLORIDE 37.5 MG/1
37.5 TABLET ORAL
Qty: 30 TAB | Refills: 0 | Status: SHIPPED | OUTPATIENT
Start: 2019-06-20 | End: 2019-07-23

## 2019-05-21 RX ORDER — PHENTERMINE HYDROCHLORIDE 37.5 MG/1
37.5 TABLET ORAL
Qty: 30 TAB | Refills: 0 | Status: SHIPPED | OUTPATIENT
Start: 2019-05-21 | End: 2019-07-23

## 2019-05-21 NOTE — PROGRESS NOTES
Izaiah Muniz is a 34 y.o.  female and presents with    Chief Complaint   Patient presents with    Medication Evaluation     Subjective:  Constipation  Mrs. Jaimes complains of constipation. Stool pattern has been 1 firm stool(s) every 2-3 days. Onset was 1 week ago Defecation has been difficult. Co-Morbid conditions:obesity. Symptoms have been symptoms have progressed to a point and plateaued. . Current Health Habits: Eating fiber? yes, amt 2-3 servings daily Exercise?yes - 4 times per week; Water intake? yes, amt 3-4 bottles Current OTC/RX therapy has been none currently but has used miralax in the past.  which has been somewhat effective.     She is also here for f/u weight management. she is taking phentermine and denies palpitations or tremors; she has lost 19 lbs; she has decreased appetite.  She is exercising and eating low calorie diet.     She is not taking medications as prescribed for blood pressure.  3 times a day is difficult to remember and comply with.      Cardiovascular Review:  The patient has hypertension, obesity and prediabetes. Diet and Lifestyle: generally follows a low fat low cholesterol diet, generally follows a low sodium diet, follows a diabetic diet regularly, exercises sporadically  Home BP Monitoring: is not measured at home.   Pertinent ROS: not taking medications as instructed, no medication side effects noted, no TIA's, no chest pain on exertion, no dyspnea on exertion, no swelling of ankles     ROS   General ROS: negative for - chills, fever; + weight loss  Psychological ROS: negative for - anxiety or depression  Ophthalmic ROS: negative for - blurry vision  ENT ROS: negative for - headaches, nasal congestion or sore throat  Endocrine ROS: negative for - polydipsia/polyuria or temperature intolerance  Respiratory ROS: no cough, shortness of breath, or wheezing  Cardiovascular ROS: no chest pain or dyspnea on exertion  Genito-Urinary ROS: no dysuria, trouble voiding, or hematuria  Musculoskeletal ROS: negative for - joint pain or muscle pain  Neurological ROS: negative for - numbness/tingling or weakness  Dermatological ROS: negative for - rash or skin lesion changes     All other systems reviewed and are negative. Objective:  Vitals:    05/21/19 1314 05/21/19 1319   BP: (!) 151/109 (!) 159/95   Pulse: 87    Resp: 16    Temp: 98.8 °F (37.1 °C)    TempSrc: Oral    SpO2: 99%    Weight: 276 lb 6.4 oz (125.4 kg)    Height: 5' 8\" (1.727 m)    PainSc:   0 - No pain    LMP: 05/04/2019     alert, well appearing, and in no distress, oriented to person, place, and time and morbidly obese  Mental status - normal mood, behavior, speech, dress, motor activity, and thought processes  Chest - clear to auscultation, no wheezes, rales or rhonchi, symmetric air entry  Heart - normal rate, regular rhythm, normal S1, S2, no murmurs, rubs, clicks or gallops  Abdomen - soft, nontender, nondistended, no masses or organomegally      Assessment/Plan:    1. Morbid obesity with BMI of 45.0-49.9, adult ([facility]  I have reviewed/discussed the above normal BMI with the patient. I have recommended the following interventions: dietary management education, guidance, and counseling and encourage exercise . Devang yang phentermine      - phentermine (ADIPEX-P) 37.5 mg tablet; Take 1 Tab by mouth every morning. Max Daily Amount: 37.5 mg.  Dispense: 30 Tab; Refill: 0  - phentermine (ADIPEX-P) 37.5 mg tablet; Take 1 Tab by mouth every morning. Max Daily Amount: 37.5 mg.  Dispense: 30 Tab; Refill: 0    2. Slow transit constipation  Water intake and fiber intake encouraged    3. Essential hypertension with goal blood pressure less than 130/85      4. Hyperthyroidism        Lab review: no lab studies available for review at time of visit      I have discussed the diagnosis with the patient and the intended plan as seen in the above orders.   The patient has received an after-visit summary and questions were answered concerning future plans. I have discussed medication side effects and warnings with the patient as well. I have reviewed the plan of care with the patient, accepted their input and they are in agreement with the treatment goals.

## 2019-05-21 NOTE — PROGRESS NOTES
Chief Complaint   Patient presents with    Medication Evaluation     1. Have you been to the ER, urgent care clinic since your last visit? Hospitalized since your last visit? No    2. Have you seen or consulted any other health care providers outside of the [address] [place] [address] since your last visit? Include any pap smears or colon screening.  No

## 2019-06-14 ENCOUNTER — OFFICE VISIT (OUTPATIENT)
Dept: FAMILY MEDICINE CLINIC | Age: 34
End: 2019-06-14

## 2019-06-14 VITALS
BODY MASS INDEX: 41.16 KG/M2 | WEIGHT: 271.6 LBS | TEMPERATURE: 98.1 F | DIASTOLIC BLOOD PRESSURE: 89 MMHG | OXYGEN SATURATION: 100 % | SYSTOLIC BLOOD PRESSURE: 140 MMHG | HEIGHT: 68 IN | RESPIRATION RATE: 17 BRPM | HEART RATE: 84 BPM

## 2019-06-14 DIAGNOSIS — R11.0 NAUSEA: ICD-10-CM

## 2019-06-14 DIAGNOSIS — E05.90 HYPERTHYROIDISM: ICD-10-CM

## 2019-06-14 DIAGNOSIS — R53.83 FATIGUE DUE TO DEPRESSION: ICD-10-CM

## 2019-06-14 DIAGNOSIS — F32.A FATIGUE DUE TO DEPRESSION: ICD-10-CM

## 2019-06-14 DIAGNOSIS — Z32.01 POSITIVE PREGNANCY TEST: ICD-10-CM

## 2019-06-14 DIAGNOSIS — N92.6 MISSED PERIOD: Primary | ICD-10-CM

## 2019-06-14 LAB
HCG QL BLOOD POCT, HCGQLPOCT: NORMAL
HCG URINE, QL. (POC): POSITIVE
PREGNANCY-SERUM, 6158: POSITIVE
VALID INTERNAL CONTROL?: YES

## 2019-06-14 RX ORDER — SWAB
1 SWAB, NON-MEDICATED MISCELLANEOUS DAILY
Qty: 100 TAB | Refills: 4 | Status: SHIPPED | OUTPATIENT
Start: 2019-06-14

## 2019-06-14 NOTE — PROGRESS NOTES
Dorothy Weber is a 34 y.o.  female and presents with    Chief Complaint   Patient presents with    Fatigue     x 2 weeks           Subjective:  Fatigue  Patient complains of fatigue. Symptoms began 2 months ago. Sentinal symptom the patient feels fatigue began with: tearfulness. Symptoms of her fatigue have been general malaise. Patient describes the following psychologic symptoms: stress at work: mild. Patient denies fever, significant change in weight, symptoms of true arthritis, true exercise intolerance, GI blood loss, excessive menstrual bleeding. The course has been gradually worsening. Severity has been symptoms bothersome, but easily able to carry out all usual work/school/family. Previous visits for this problem: none.      ROS   General ROS: negative for - chills, fever; + weight loss  Psychological ROS: negative for - anxiety or depression  Ophthalmic ROS: negative for - blurry vision  ENT ROS: negative for - headaches, nasal congestion or sore throat  Endocrine ROS: negative for - polydipsia/polyuria or temperature intolerance  Respiratory ROS: no cough, shortness of breath, or wheezing  Cardiovascular ROS: no chest pain or dyspnea on exertion  Genito-Urinary ROS: no dysuria, trouble voiding, or hematuria  Musculoskeletal ROS: negative for - joint pain or muscle pain  Neurological ROS: negative for - numbness/tingling or weakness  Dermatological ROS: negative for - rash or skin lesion changes     All other systems reviewed and are negative    Objective:  Vitals:    06/14/19 1501   BP: 140/89   Pulse: 84   Resp: 17   Temp: 98.1 °F (36.7 °C)   TempSrc: Oral   SpO2: 100%   Weight: 271 lb 9.6 oz (123.2 kg)   Height: 5' 8\" (1.727 m)   PainSc:   0 - No pain   LMP: 05/04/2019       alert, well appearing, and in no distress, oriented to person, place, and time and morbidly obese  Mental status - normal mood, behavior, speech, dress, motor activity, and thought processes  Chest - clear to auscultation, no wheezes, rales or rhonchi, symmetric air entry  Heart - normal rate, regular rhythm, normal S1, S2, no murmurs, rubs, clicks or gallops  Abdomen - soft, nontender, nondistended, no masses or organomegally        LABS   Urine hcg positive    Assessment/Plan:    1. Missed period    - AMB POC GONADOTROPIN, CHORIONIC (HCG); QUALITATIVE    2. Fatigue due to depression      3. Nausea  antiemetic    4. Positive pregnancy test  Refer to obgyn  - HCG QL SERUM; Future  - HCG QL SERUM  - prenatal vit-iron fumarate-fa (PRENATAL PLUS WITH IRON) 28 mg iron- 800 mcg tab; Take 1 Tab by mouth daily. Dispense: 100 Tab; Refill: 4    5. Hyperthyroidism    - prenatal vit-iron fumarate-fa (PRENATAL PLUS WITH IRON) 28 mg iron- 800 mcg tab; Take 1 Tab by mouth daily. Dispense: 100 Tab; Refill: 4    Lab review: labs reviewed, I note that hcg positive      I have discussed the diagnosis with the patient and the intended plan as seen in the above orders. The patient has received an after-visit summary and questions were answered concerning future plans. I have discussed medication side effects and warnings with the patient as well. I have reviewed the plan of care with the patient, accepted their input and they are in agreement with the treatment goals.

## 2019-06-14 NOTE — LETTER
NOTIFICATION RETURN TO WORK  
 
6/14/2019 3:31 PM 
 
Ms. Rufino Burnett 
[address] [place] [phone] To Whom It May Concern: 
 
Rufino Burnett is currently under the care of [address] [place] [address]. She will return to work/school on: 06/14/2019 If there are questions or concerns please have the patient contact our office. Sincerely, Orlan Halsted, MD

## 2019-06-14 NOTE — PROGRESS NOTES
Mushtaq Tolliver is a 34 y.o female that is present in the office for a same day appointment for fatigue x 2 weeks. 1. Have you been to the ER, urgent care clinic since your last visit? Hospitalized since your last visit? No    2. Have you seen or consulted any other health care providers outside of the [address] [place] [address] since your last visit? Include any pap smears or colon screening.  No    Health Maintenance Due   Topic Date Due    DTaP/Tdap/Td series (1 - Tdap) 03/31/2010    PAP AKA CERVICAL CYTOLOGY  07/27/2018

## 2019-06-18 ENCOUNTER — TELEPHONE (OUTPATIENT)
Dept: FAMILY MEDICINE CLINIC | Age: 34
End: 2019-06-18

## 2019-06-18 DIAGNOSIS — Z3A.01 LESS THAN 8 WEEKS GESTATION OF PREGNANCY: Primary | ICD-10-CM

## 2019-06-18 NOTE — TELEPHONE ENCOUNTER
Contacted the patient and advised her on the lab results. Patient verbalized understanding and tolerated well. Patient had a couple of questions but was advised that provider is out of the office this week. Patient asked:   - Can she still go to the gym and workout?  -Will there be an issue being that she has PCOS and Graves Disease?  -Patient request OB GYN referral ([facility] for [facility]) so she can pick her OB.  -Is it okay to take OTC prenatal pills purchased? After completion, she would take the prescription Prenatal plus? Please advise and once a response is received, will contact the patient. Thank you.

## 2019-06-19 NOTE — TELEPHONE ENCOUNTER
Contacted patient and left message to contact the office when available. Awaiting callback. Will advise patient that the referral for OBGYN has been generated and they will contact her to scheduled and she can still work out at [employer].

## 2019-06-25 LAB
ANTIBODY SCREEN, EXTERNAL: NEGATIVE
HBA1C MFR BLD HPLC: 4.5 %
HBSAG, EXTERNAL: NEGATIVE
HIV, EXTERNAL: NEGATIVE
N. GONORRHEA, EXTERNAL: NEGATIVE
RUBELLA, EXTERNAL: NORMAL
TYPE, ABO & RH, EXTERNAL: NORMAL

## 2019-07-23 ENCOUNTER — OFFICE VISIT (OUTPATIENT)
Dept: FAMILY MEDICINE CLINIC | Age: 34
End: 2019-07-23

## 2019-07-23 VITALS
HEART RATE: 75 BPM | RESPIRATION RATE: 16 BRPM | WEIGHT: 276.8 LBS | BODY MASS INDEX: 41.95 KG/M2 | HEIGHT: 68 IN | SYSTOLIC BLOOD PRESSURE: 137 MMHG | OXYGEN SATURATION: 99 % | DIASTOLIC BLOOD PRESSURE: 84 MMHG | TEMPERATURE: 98.5 F

## 2019-07-23 DIAGNOSIS — I10 ESSENTIAL HYPERTENSION WITH GOAL BLOOD PRESSURE LESS THAN 130/85: ICD-10-CM

## 2019-07-23 DIAGNOSIS — E66.01 MORBID OBESITY WITH BMI OF 45.0-49.9, ADULT (HCC): ICD-10-CM

## 2019-07-23 DIAGNOSIS — Z3A.10 10 WEEKS GESTATION OF PREGNANCY: Primary | ICD-10-CM

## 2019-07-23 RX ORDER — METFORMIN HYDROCHLORIDE 500 MG/1
TABLET, EXTENDED RELEASE ORAL
Qty: 90 TAB | Refills: 0 | Status: ON HOLD | OUTPATIENT
Start: 2019-07-23 | End: 2019-12-10

## 2019-07-23 RX ORDER — LABETALOL 100 MG/1
TABLET, FILM COATED ORAL 2 TIMES DAILY
COMMUNITY
End: 2020-01-13

## 2019-07-23 NOTE — PROGRESS NOTES
Chief Complaint   Patient presents with    Medication Evaluation     1. Have you been to the ER, urgent care clinic since your last visit? Hospitalized since your last visit? No    2. Have you seen or consulted any other health care providers outside of the [address] [place] [address] since your last visit? Include any pap smears or colon screening.  No

## 2019-07-23 NOTE — PROGRESS NOTES
Kenzie Garcia is a 34 y.o.  female and presents with    Chief Complaint   Patient presents with    Medication Evaluation     Subjective:  Mrs. Cheryle Sloan presents for f/u pregnancy. She is followed by [facility]. She reports that thyroid has decreased  Thyroid Review:  Patient is seen for followup of hyperthyroidism. Thyroid ROS: + fatigue and weight changes,denies heat/cold intolerance, bowel/skin changes or CVS symptoms. ROS   General ROS: negative for - chills, fever; + weight loss  Psychological ROS: negative for - anxiety or depression  Ophthalmic ROS: negative for - blurry vision  ENT ROS: negative for - headaches, nasal congestion or sore throat  Endocrine ROS: negative for - polydipsia/polyuria or temperature intolerance  Respiratory ROS: no cough, shortness of breath, or wheezing  Cardiovascular ROS: no chest pain or dyspnea on exertion  Genito-Urinary ROS: no dysuria, trouble voiding, or hematuria  Musculoskeletal ROS: negative for - joint pain or muscle pain  Neurological ROS: negative for - numbness/tingling or weakness  Dermatological ROS: negative for - rash or skin lesion changes       All other systems reviewed and are negative.       Objective:  Vitals:    07/23/19 1355 07/23/19 1400   BP: (!) 137/92 137/84   Pulse: 75    Resp: 16    Temp: 98.5 °F (36.9 °C)    TempSrc: Oral    SpO2: 99%    Weight: 276 lb 12.8 oz (125.6 kg)    Height: 5' 8\" (1.727 m)    PainSc:   0 - No pain      alert, well appearing, and in no distress, oriented to person, place, and time and morbidly obese  Mental status - normal mood, behavior, speech, dress, motor activity, and thought processes  Chest - clear to auscultation, no wheezes, rales or rhonchi, symmetric air entry  Heart - normal rate, regular rhythm, normal S1, S2, no murmurs, rubs, clicks or gallops  Abdomen - soft, nontender, nondistended, no masses or organomegally    Assessment/Plan:    1. 10 weeks gestation of pregnancy  F/u with obgyn    2. Morbid obesity with BMI of 45.0-49.9, adult ([facility]  I have reviewed/discussed the above normal BMI with the patient. I have recommended the following interventions: dietary management education, guidance, and counseling and encourage exercise . [place] 3. Essential hypertension with goal blood pressure less than 130/85      Lab review: labs reviewed, I note that TSH normal      I have discussed the diagnosis with the patient and the intended plan as seen in the above orders. The patient has received an after-visit summary and questions were answered concerning future plans. I have discussed medication side effects and warnings with the patient as well. I have reviewed the plan of care with the patient, accepted their input and they are in agreement with the treatment goals.

## 2019-12-10 ENCOUNTER — HOSPITAL ENCOUNTER (OUTPATIENT)
Age: 34
Discharge: HOME | End: 2019-12-10
Attending: OBSTETRICS & GYNECOLOGY | Admitting: OBSTETRICS & GYNECOLOGY
Payer: COMMERCIAL

## 2019-12-10 VITALS
RESPIRATION RATE: 18 BRPM | DIASTOLIC BLOOD PRESSURE: 74 MMHG | TEMPERATURE: 98.4 F | HEART RATE: 88 BPM | SYSTOLIC BLOOD PRESSURE: 134 MMHG

## 2019-12-10 PROBLEM — O36.8190 DECREASED FETAL MOVEMENT: Status: ACTIVE | Noted: 2019-12-10

## 2019-12-10 PROCEDURE — 99282 EMERGENCY DEPT VISIT SF MDM: CPT

## 2019-12-10 PROCEDURE — 59025 FETAL NON-STRESS TEST: CPT

## 2019-12-10 NOTE — PROGRESS NOTES
8719 patient arrived from ED via wheelchair with c/o no fetal movement since 1900 last night. Patient states when she woke up at 0215, she had a glass of water and laid on her left side and still didn't feel baby move so came in.  Patient gave urine sample if needed, placed on EFM

## 2019-12-10 NOTE — DISCHARGE SUMMARY
History & Physical  IDENTIFYING DATA  Patient's Name:  Chinedu Beatty. MRN: [id number]. : 1985. Date of Service:  12/10/2019  4:32 AM  provider:  Gregorio Arndt CNM    CHIEF COMPLAINT: c/o decreased fetal movement    OB HISTORY:    OB History        1    Para        Term                AB        Living           SAB        TAB        Ectopic        Molar        Multiple        Live Births                    IMPRESSION:    Indication:   Chinedu Beatty is a  30 y.o. pregnant patient at 30w5d weeks. Estimated Date of Delivery: 20     C/o decreased fetal movement since  at noon. Did not improve thru evening.     Reactive NST         RECOMMENDATIONS:    Home              Subjective:   PAST MEDICAL HISTORY:   Past Medical History:   Diagnosis Date    Diabetes ([address]     External hemorrhoid, bleeding     Hypertension     Hyperthyroidism     PCOS (polycystic ovarian syndrome)        PAST SURGICAL HISTORY:   Past Surgical History:   Procedure Laterality Date    HX CHOLECYSTECTOMY      HX GI         SOCIAL HISTORY:    Social History     Socioeconomic History    Marital status:      Spouse name: Not on file    Number of children: Not on file    Years of education: Not on file    Highest education level: Not on file   Occupational History    Not on file   Social Needs    Financial resource strain: Not on file    Food insecurity:     Worry: Not on file     Inability: Not on file    Transportation needs:     Medical: Not on file     Non-medical: Not on file   Tobacco Use    Smoking status: Never Smoker    Smokeless tobacco: Never Used   Substance and Sexual Activity    Alcohol use: No    Drug use: No    Sexual activity: Yes     Partners: Male     Birth control/protection: None   Lifestyle    Physical activity:     Days per week: Not on file     Minutes per session: Not on file    Stress: Not on file   Relationships    Social connections:     Talks on phone: Not on file     Gets together: Not on file     Attends Cheondoism service: Not on file     Active member of club or organization: Not on file     Attends meetings of clubs or organizations: Not on file     Relationship status: Not on file    Intimate partner violence:     Fear of current or ex partner: Not on file     Emotionally abused: Not on file     Physically abused: Not on file     Forced sexual activity: Not on file   Other Topics Concern     Service Not Asked    Blood Transfusions Not Asked    Caffeine Concern Not Asked    Occupational Exposure Not Asked   [place] Hazards Not Asked    Sleep Concern Not Asked    Stress Concern Not Asked    Weight Concern Not Asked    Special Diet Not Asked    Back Care Not Asked    Exercise Not Asked    Bike Helmet Not Asked    Seat Belt Not Asked    Self-Exams Not Asked   Social History Narrative    Not on file       FAMILY HISTORY    Family History   Problem Relation Age of Onset    Hypertension Mother     Hypertension Father        ALLERGY:    Allergies   Allergen Reactions    Oxycodone Hives       HOME MEDICATIONS:   Prior to Admission medications    Medication Sig Start Date End Date Taking? Authorizing Provider   labetalol (NORMODYNE) 100 mg tablet Take  by mouth two (2) times a day. Yes Provider, Historical   prenatal vit-iron fumarate-fa (PRENATAL PLUS WITH IRON) 28 mg iron- 800 mcg tab Take 1 Tab by mouth daily. 6/14/19  Yes Yves Parks MD        FETAL MONITORING:  Baseline FHR: No data found. REVIEW OF SYMPTOMS:   Constitutional: fever, chills denied. Respiratory: Cough denied. Shortness of breath denied. Cardiovascular: Chest pains denied. Gastrointestinal: Nausea, Vomiting, Diarrhea, Constipation denied. Genitourinary: Vaginal Bleeding, Vaginal Odor, Painful Urination, Blood in Urine denied. Pain over kidneys denied. Musculoskeletal: Back pain, Joint pain denied. Skin:  Rash denied. Injuries denied. Neurological:  Headaches, Dizziness, Seizures denied. Psychiatric/Behavioral: Major mood problems denied. Objective:     Physical Exam:     Visit Vitals  /74   Pulse 88   Temp 98.4 °F (36.9 °C)   Resp 18     General appearance:   Alert, oriented to person, place, and time, cooperative, no distress, appears stated age and is well-developed and well-nourished. Heart:  Regular rate and Rhythm. Lungs: Effort normal, No apparent respiratory distress, Wheezes or Cough. Abdomen: Term pregnancy appropriate size. Abnormal tenderness not detected. Scars None detected. External genitalia: normal appearance without obvious lesions. [place]'s,  [place]'s, Urethra: Normal.   Vaginal Vault:  Discharge or Odor not detected. Blood not detected. Foreign body or Injury not detected. Uterus:  Appropriate size for gestational age. Musculoskeletal: Normal range of motion.      Signed By:    Jaye Bennett CNM  December 10, 2019 4:32 AM

## 2019-12-10 NOTE — PROGRESS NOTES
26- N. Emily Agustin in to see pt. Discussed kick counts and fetal tracing. Okay to discharge to home. 0500- Pt given and explained discharge instructions. All questions answered and she verbalized an understanding. Pt ambulated off the unit with a steady gait and belongings in tow accompanied by significant other.

## 2020-01-02 ENCOUNTER — HOSPITAL ENCOUNTER (OUTPATIENT)
Age: 35
Discharge: HOME | End: 2020-01-02
Attending: OBSTETRICS & GYNECOLOGY | Admitting: OBSTETRICS & GYNECOLOGY
Payer: COMMERCIAL

## 2020-01-02 VITALS
TEMPERATURE: 98.9 F | DIASTOLIC BLOOD PRESSURE: 61 MMHG | SYSTOLIC BLOOD PRESSURE: 134 MMHG | RESPIRATION RATE: 17 BRPM | HEART RATE: 95 BPM

## 2020-01-02 PROCEDURE — 59025 FETAL NON-STRESS TEST: CPT

## 2020-01-02 PROCEDURE — 99282 EMERGENCY DEPT VISIT SF MDM: CPT

## 2020-01-03 NOTE — PROGRESS NOTES
Pt  34wk presents to L&D for scheduled NST for HTN. Pt denies HA, visual disturbances, and epigastric pain. Pt up to bathroom independently. EFM and toco applied. VS WNL    -- Updated TERESITA Jacques on pt status: reactive NST, no ctx, VS WNL. CNM reviewing FHR tracing, states pt may be discharged with follow up as scheduled. -- EFM and toco removed. D/C instructions reviewed w/ patient. Scheduled next NST for 20 at 0915 following her next prenatal appointment w/ [facility].     -- pt signed out in computer and discharged from unit

## 2020-01-06 ENCOUNTER — HOSPITAL ENCOUNTER (OUTPATIENT)
Age: 35
Discharge: HOME | End: 2020-01-06
Attending: OBSTETRICS & GYNECOLOGY | Admitting: OBSTETRICS & GYNECOLOGY
Payer: COMMERCIAL

## 2020-01-06 VITALS
HEIGHT: 68 IN | BODY MASS INDEX: 44.41 KG/M2 | WEIGHT: 293 LBS | SYSTOLIC BLOOD PRESSURE: 150 MMHG | HEART RATE: 83 BPM | DIASTOLIC BLOOD PRESSURE: 84 MMHG

## 2020-01-06 PROBLEM — O13.9 GESTATIONAL HYPERTENSION: Status: ACTIVE | Noted: 2020-01-06

## 2020-01-06 LAB
ALBUMIN SERPL-MCNC: 2.7 G/DL (ref 3.4–5)
ALBUMIN/GLOB SERPL: 0.7 {RATIO} (ref 0.8–1.7)
ALP SERPL-CCNC: 95 U/L (ref 45–117)
ALT SERPL-CCNC: 29 U/L (ref 13–56)
ANION GAP SERPL CALC-SCNC: 7 MMOL/L (ref 3–18)
AST SERPL-CCNC: 21 U/L (ref 10–38)
BASOPHILS # BLD: 0 K/UL (ref 0–0.1)
BASOPHILS NFR BLD: 0 % (ref 0–2)
BILIRUB SERPL-MCNC: 0.3 MG/DL (ref 0.2–1)
BUN SERPL-MCNC: 7 MG/DL (ref 7–18)
BUN/CREAT SERPL: 10 (ref 12–20)
CALCIUM SERPL-MCNC: 8.8 MG/DL (ref 8.5–10.1)
CHLORIDE SERPL-SCNC: 109 MMOL/L (ref 100–111)
CO2 SERPL-SCNC: 25 MMOL/L (ref 21–32)
CREAT SERPL-MCNC: 0.68 MG/DL (ref 0.6–1.3)
CREAT UR-MCNC: 72 MG/DL (ref 30–125)
DIFFERENTIAL METHOD BLD: ABNORMAL
EOSINOPHIL # BLD: 0 K/UL (ref 0–0.4)
EOSINOPHIL NFR BLD: 0 % (ref 0–5)
ERYTHROCYTE [DISTWIDTH] IN BLOOD BY AUTOMATED COUNT: 13.8 % (ref 11.6–14.5)
GLOBULIN SER CALC-MCNC: 3.7 G/DL (ref 2–4)
GLUCOSE SERPL-MCNC: 72 MG/DL (ref 74–99)
HCT VFR BLD AUTO: 33.8 % (ref 35–45)
HGB BLD-MCNC: 10.8 G/DL (ref 12–16)
LDH SERPL L TO P-CCNC: 188 U/L (ref 81–234)
LYMPHOCYTES # BLD: 1.3 K/UL (ref 0.9–3.6)
LYMPHOCYTES NFR BLD: 14 % (ref 21–52)
MCH RBC QN AUTO: 22.5 PG (ref 24–34)
MCHC RBC AUTO-ENTMCNC: 32 G/DL (ref 31–37)
MCV RBC AUTO: 70.6 FL (ref 74–97)
MONOCYTES # BLD: 1 K/UL (ref 0.05–1.2)
MONOCYTES NFR BLD: 11 % (ref 3–10)
NEUTS SEG # BLD: 6.8 K/UL (ref 1.8–8)
NEUTS SEG NFR BLD: 75 % (ref 40–73)
PLATELET # BLD AUTO: 345 K/UL (ref 135–420)
PMV BLD AUTO: 10.8 FL (ref 9.2–11.8)
POTASSIUM SERPL-SCNC: 4.1 MMOL/L (ref 3.5–5.5)
PROT SERPL-MCNC: 6.4 G/DL (ref 6.4–8.2)
PROT UR-MCNC: 8 MG/DL
PROT/CREAT UR-RTO: 0.1
RBC # BLD AUTO: 4.79 M/UL (ref 4.2–5.3)
SODIUM SERPL-SCNC: 141 MMOL/L (ref 136–145)
URATE SERPL-MCNC: 3.9 MG/DL (ref 2.6–7.2)
WBC # BLD AUTO: 9.1 K/UL (ref 4.6–13.2)

## 2020-01-06 PROCEDURE — 84156 ASSAY OF PROTEIN URINE: CPT

## 2020-01-06 PROCEDURE — 80053 COMPREHEN METABOLIC PANEL: CPT

## 2020-01-06 PROCEDURE — 59025 FETAL NON-STRESS TEST: CPT

## 2020-01-06 PROCEDURE — 85025 COMPLETE CBC W/AUTO DIFF WBC: CPT

## 2020-01-06 PROCEDURE — 84550 ASSAY OF BLOOD/URIC ACID: CPT

## 2020-01-06 PROCEDURE — 83615 LACTATE (LD) (LDH) ENZYME: CPT

## 2020-01-06 NOTE — PROGRESS NOTES
Received ambulatory to room 3415 for NST due to chronic hypertension. Admits to headache,\"feels like a sinus headache\", Denies blurred vision, floaters. Denies vaginal leakage, bleeding or s/sy of labor. Admits to fetal movement. EFM applied.

## 2020-01-06 NOTE — PROGRESS NOTES
1245 EFM off, up to Br .   1300 Reviewed discharge instructions. Verbalizes understanding of kick counts, pregnancy precautions. Denies questions, problems or c/o. Discharged to home. note given to patient for work by [employer]. Has scheduled appointment  On Thursday for NST.

## 2020-01-06 NOTE — DISCHARGE INSTRUCTIONS
Signs and symptoms of labor-continuing tightening and relaxation of abdomen  Fever 100.4  Bleeding or leaking of fluid from the vagina  Persistent headache that does not go away with rest and tylenol  Severe abdominal pain    Fetal kick counts - 10 baby movements in 1 hour   Hydration- eight 8 oz glasses of water every day  Visual disturbances  Nausea and vomiting for more than 12 hours. Questions asked and answered.         Follow up as scheduled on Thursday

## 2020-01-07 NOTE — PROGRESS NOTES
History & Physical  IDENTIFYING DATA  Patient's Name:  Wayne Lainez. MRN: [id number]. : 1985. Date of Service:  2020  6:05 AM  Provider:  Alfredo Hernandez CNM    CHIEF COMPLAINT:  Scheduled NST and Gestational hypertension    OB HISTORY:    OB History        1    Para        Term                AB        Living           SAB        TAB        Ectopic        Molar        Multiple        Live Births                    IMPRESSION:    Indication:   Wayne Lainez is a  30 y.o. pregnant patient at 34w5d weeks. Estimated Date of Delivery: 20     2 hours to acheive reactive NST   Bps mildly elevated   PIH labs completed- WNL      RECOMMENDATIONS:    D/c home. Note for work.  Return biwkly for testing              Subjective:   PAST MEDICAL HISTORY:   Past Medical History:   Diagnosis Date    External hemorrhoid, bleeding     Hypertension     Hyperthyroidism     PCOS (polycystic ovarian syndrome)        PAST SURGICAL HISTORY:   Past Surgical History:   Procedure Laterality Date    HX CHOLECYSTECTOMY      HX GI         SOCIAL HISTORY:    Social History     Socioeconomic History    Marital status:      Spouse name: Not on file    Number of children: Not on file    Years of education: Not on file    Highest education level: Not on file   Occupational History    Not on file   Social Needs    Financial resource strain: Not on file    Food insecurity:     Worry: Not on file     Inability: Not on file    Transportation needs:     Medical: Not on file     Non-medical: Not on file   Tobacco Use    Smoking status: Never Smoker    Smokeless tobacco: Never Used   Substance and Sexual Activity    Alcohol use: No    Drug use: No    Sexual activity: Yes     Partners: Male     Birth control/protection: None   Lifestyle    Physical activity:     Days per week: Not on file     Minutes per session: Not on file    Stress: Not on file   Relationships    Social connections:     Talks on phone: Not on file     Gets together: Not on file     Attends Zoroastrian service: Not on file     Active member of club or organization: Not on file     Attends meetings of clubs or organizations: Not on file     Relationship status: Not on file    Intimate partner violence:     Fear of current or ex partner: Not on file     Emotionally abused: Not on file     Physically abused: Not on file     Forced sexual activity: Not on file   Other Topics Concern     Service Not Asked    Blood Transfusions Not Asked    Caffeine Concern Not Asked    Occupational Exposure Not Asked   Debe Sanchez Hazards Not Asked    Sleep Concern Not Asked    Stress Concern Not Asked    Weight Concern Not Asked    Special Diet Not Asked    Back Care Not Asked    Exercise Not Asked    Bike Helmet Not Asked    Seat Belt Not Asked    Self-Exams Not Asked   Social History Narrative    Not on file       FAMILY HISTORY    Family History   Problem Relation Age of Onset    Hypertension Mother     Hypertension Father        ALLERGY:    Allergies   Allergen Reactions    Oxycodone Hives       HOME MEDICATIONS:   Prior to Admission medications    Medication Sig Start Date End Date Taking? Authorizing Provider   labetalol (NORMODYNE) 100 mg tablet Take  by mouth two (2) times a day. Yes Provider, Historical   prenatal vit-iron fumarate-fa (PRENATAL PLUS WITH IRON) 28 mg iron- 800 mcg tab Take 1 Tab by mouth daily. 6/14/19  Yes Jigna Hinton MD          CERVICAL EXAM: (data input under \"more activities\" and \"flowsheets\" at the top.)       FETAL MONITORING:  Baseline FHR: No data found. REVIEW OF SYMPTOMS:   Constitutional: fever, chills denied. Head, Ears, Nose Throat:   Ear pain denied. Sore throat denied. Eyes: Pain denied. Visual disturbance denied. Respiratory: Cough denied. Shortness of breath denied. Cardiovascular: Chest pains denied.    Gastrointestinal: Nausea, Vomiting, Diarrhea, Constipation denied. Genitourinary: Vaginal Bleeding, Vaginal Odor, Painful Urination, Blood in Urine denied. Pain over kidneys denied. Musculoskeletal: Back pain, Joint pain denied. Skin:  Rash denied. Injuries denied. Neurological:  Headaches, Dizziness, Seizures denied. Psychiatric/Behavioral: Major mood problems denied. Objective:     Physical Exam:     Visit Vitals  /84   Pulse 83   Ht 5' 8\" (1.727 m)   Wt 135.6 kg (299 lb)   BMI 45.46 kg/m²     General appearance:   Alert, oriented to person, place, and time, cooperative, no distress, appears stated age and is well-developed and well-nourished. Head, Nose, Throat: Normocephalic, atraumatic. Eyes: Conjunctivae appear normal.  Pupils are equal and round. Neck: Normal range of motion. Supple. Heart:  Regular rate and Rhythm. Lungs: Effort normal, No apparent respiratory distress, Wheezes or Cough. Abdomen: Term pregnancy appropriate size. Abnormal tenderness not detected. Scars None detected. External genitalia: normal appearance without obvious lesions. Bartholin's,  [place]'s, Urethra: Normal.   Vaginal Vault:  Discharge or Odor not detected. Blood not detected. Foreign body or Injury not detected. Uterus:  Appropriate size for gestational age. Musculoskeletal: Normal range of motion. Neurological: Loss of strength or sensation not detected. Disorientation to time, person, place not detected. Skin: Lesions not detected. Rashes not detected. Extremities:  Trauma,  Swelling or edema not detected. Psychiatric: Abnormal mood or affect not detected.      Signed By:    Ely Lynne CNM  January 7, 2020 6:05 AM

## 2020-01-09 ENCOUNTER — HOSPITAL ENCOUNTER (OUTPATIENT)
Age: 35
Discharge: HOME | End: 2020-01-09
Attending: OBSTETRICS & GYNECOLOGY | Admitting: OBSTETRICS & GYNECOLOGY
Payer: COMMERCIAL

## 2020-01-09 VITALS
RESPIRATION RATE: 16 BRPM | TEMPERATURE: 98.1 F | HEART RATE: 93 BPM | DIASTOLIC BLOOD PRESSURE: 80 MMHG | SYSTOLIC BLOOD PRESSURE: 159 MMHG

## 2020-01-09 PROCEDURE — 59025 FETAL NON-STRESS TEST: CPT

## 2020-01-09 NOTE — PROGRESS NOTES
Received ambulatory to room 3420 for NSt due to history of chronic hypertension.  EDC 20. Denies headache, dizziness, blurred vision. Denies CTX, vaginal lekage, bleeding or s/sy of labor/ Admits to fetal movement. EFM applied.

## 2020-01-09 NOTE — PROGRESS NOTES
reveiwed  Discharge instructions. Verbalizes understanding of self care. Denies questions, problems or c/o. Discharged to home.  Scheduled for  NSt Monday at 1400

## 2020-01-09 NOTE — DISCHARGE INSTRUCTIONS
Discharge instructions reviewed with pt verbally and pt given written copy of instructions. NOTIFY YOUR DOCTOR/PROVIDER IF:    Signs and symptoms of labor-continuing tightening and relaxation of abdomen  Fever 100.4  Bleeding or leaking of fluid from the vagina  Persistent headache that does not go away with rest and tylenol  Severe abdominal pain    Fetal kick counts - 10 baby movements in 1 hour   Hydration- eight 8 oz glasses of water every day  Visual disturbances  Nausea and vomiting for more than 12 hours. Questions asked and answered.         Follow up  As scheduled in office and bi-weekly NST

## 2020-01-13 ENCOUNTER — HOSPITAL ENCOUNTER (OUTPATIENT)
Age: 35
Setting detail: OBSERVATION
Discharge: HOME | End: 2020-01-13
Attending: OBSTETRICS & GYNECOLOGY | Admitting: OBSTETRICS & GYNECOLOGY
Payer: COMMERCIAL

## 2020-01-13 VITALS
HEART RATE: 92 BPM | DIASTOLIC BLOOD PRESSURE: 89 MMHG | TEMPERATURE: 97.7 F | RESPIRATION RATE: 18 BRPM | SYSTOLIC BLOOD PRESSURE: 137 MMHG

## 2020-01-13 PROBLEM — Z34.90 PREGNANCY: Status: ACTIVE | Noted: 2020-01-13

## 2020-01-13 PROBLEM — O36.8190 DECREASED FETAL MOVEMENT: Status: RESOLVED | Noted: 2019-12-10 | Resolved: 2020-01-13

## 2020-01-13 PROBLEM — O16.9 HYPERTENSION DURING PREGNANCY: Status: ACTIVE | Noted: 2020-01-13

## 2020-01-13 LAB
ALBUMIN SERPL-MCNC: 2.8 G/DL (ref 3.4–5)
ALBUMIN/GLOB SERPL: 0.8 {RATIO} (ref 0.8–1.7)
ALP SERPL-CCNC: 107 U/L (ref 45–117)
ALT SERPL-CCNC: 35 U/L (ref 13–56)
ANION GAP SERPL CALC-SCNC: 8 MMOL/L (ref 3–18)
AST SERPL-CCNC: 19 U/L (ref 10–38)
BILIRUB SERPL-MCNC: 0.3 MG/DL (ref 0.2–1)
BUN SERPL-MCNC: 11 MG/DL (ref 7–18)
BUN/CREAT SERPL: 15 (ref 12–20)
CALCIUM SERPL-MCNC: 9.1 MG/DL (ref 8.5–10.1)
CHLORIDE SERPL-SCNC: 109 MMOL/L (ref 100–111)
CO2 SERPL-SCNC: 20 MMOL/L (ref 21–32)
CREAT SERPL-MCNC: 0.72 MG/DL (ref 0.6–1.3)
CREAT UR-MCNC: 151 MG/DL (ref 30–125)
ERYTHROCYTE [DISTWIDTH] IN BLOOD BY AUTOMATED COUNT: 13.9 % (ref 11.6–14.5)
GLOBULIN SER CALC-MCNC: 3.7 G/DL (ref 2–4)
GLUCOSE SERPL-MCNC: 114 MG/DL (ref 74–99)
HCT VFR BLD AUTO: 34.3 % (ref 35–45)
HGB BLD-MCNC: 10.8 G/DL (ref 12–16)
MCH RBC QN AUTO: 22.5 PG (ref 24–34)
MCHC RBC AUTO-ENTMCNC: 31.5 G/DL (ref 31–37)
MCV RBC AUTO: 71.5 FL (ref 74–97)
PLATELET # BLD AUTO: 345 K/UL (ref 135–420)
PMV BLD AUTO: 10.7 FL (ref 9.2–11.8)
POTASSIUM SERPL-SCNC: 4 MMOL/L (ref 3.5–5.5)
PROT SERPL-MCNC: 6.5 G/DL (ref 6.4–8.2)
PROT UR-MCNC: 21 MG/DL
PROT/CREAT UR-RTO: 0.1
RBC # BLD AUTO: 4.8 M/UL (ref 4.2–5.3)
SODIUM SERPL-SCNC: 137 MMOL/L (ref 136–145)
URATE SERPL-MCNC: 4.1 MG/DL (ref 2.6–7.2)
WBC # BLD AUTO: 12.3 K/UL (ref 4.6–13.2)

## 2020-01-13 PROCEDURE — 80053 COMPREHEN METABOLIC PANEL: CPT

## 2020-01-13 PROCEDURE — 85027 COMPLETE CBC AUTOMATED: CPT

## 2020-01-13 PROCEDURE — 99283 EMERGENCY DEPT VISIT LOW MDM: CPT

## 2020-01-13 PROCEDURE — 84156 ASSAY OF PROTEIN URINE: CPT

## 2020-01-13 PROCEDURE — 74011250637 HC RX REV CODE- 250/637: Performed by: OBSTETRICS & GYNECOLOGY

## 2020-01-13 PROCEDURE — 84550 ASSAY OF BLOOD/URIC ACID: CPT

## 2020-01-13 PROCEDURE — 59025 FETAL NON-STRESS TEST: CPT

## 2020-01-13 RX ORDER — LABETALOL 100 MG/1
100 TABLET, FILM COATED ORAL 2 TIMES DAILY
Status: CANCELLED | OUTPATIENT
Start: 2020-01-13

## 2020-01-13 RX ORDER — LABETALOL 100 MG/1
200 TABLET, FILM COATED ORAL EVERY 12 HOURS
Status: DISCONTINUED | OUTPATIENT
Start: 2020-01-13 | End: 2020-01-13 | Stop reason: HOSPADM

## 2020-01-13 RX ORDER — LABETALOL 200 MG/1
200 TABLET, FILM COATED ORAL 2 TIMES DAILY
Qty: 60 TAB | Refills: 0 | Status: ON HOLD | OUTPATIENT
Start: 2020-01-13 | End: 2020-01-28 | Stop reason: SDUPTHER

## 2020-01-13 RX ADMIN — LABETALOL HYDROCHLORIDE 200 MG: 100 TABLET, FILM COATED ORAL at 17:13

## 2020-01-13 NOTE — DISCHARGE INSTRUCTIONS
Patient Education        High Blood Pressure in Pregnancy: Care Instructions  Your Care Instructions    High blood pressure (hypertension) means that the force of blood against your artery walls is too strong. Mild high blood pressure during pregnancy is not usually dangerous. Your doctor will probably just want to watch you closely. But when blood pressure is very high, it can reduce oxygen to your baby. This can affect how well your baby grows. High blood pressure also means that you are at higher risk for:  · Preeclampsia. This is a problem that includes high blood pressure and damage to your liver or kidneys. It can also reduce how much oxygen your baby gets. In some cases, it leads to eclampsia. Eclampsia causes seizures. · Placental abruption. This is a problem when the placenta separates from the uterus before birth. It prevents the baby from getting enough oxygen and nutrients. Sometimes it can cause death for the baby and the mother. To prevent problems for you or your baby, you will have to check your blood pressure often. You will do this until after your baby is born. If your blood pressure rises suddenly or is very high during your pregnancy, your doctor may prescribe medicines. They can usually control blood pressure. If your blood pressure affects your or your baby's health, your doctor may need to deliver your baby early. After your baby is born, your blood pressure will probably improve. But sometimes blood pressure problems continue after birth. Follow-up care is a key part of your treatment and safety. Be sure to make and go to all appointments, and call your doctor if you are having problems. It's also a good idea to know your test results and keep a list of the medicines you take. How can you care for yourself at home? · Take and write down your blood pressure at home if your doctor says to. · Take your medicines exactly as prescribed.  Call your doctor if you think you are having a problem with your medicine. · Do not smoke. This is one of the best things you can do to help your baby be healthy. If you need help quitting, talk to your doctor about stop-smoking programs and medicines. These can increase your chances of quitting for good. · Don't gain too much weight during pregnancy. Talk to your doctor about how much weight gain is healthy. · Eat a healthy diet. · If your doctor says it's okay, get regular exercise. Walking or swimming several times a week can be healthy for you and your baby. · Reduce stress, and find time to relax. This is very important if you continue to work or have a busy schedule. It's also important if you have small children at home. When should you call for help? Call 911 anytime you think you may need emergency care. For example, call if:    · You passed out (lost consciousness).     · You have a seizure.    Call your doctor now or seek immediate medical care if:    · You have symptoms of preeclampsia, such as:  ? Sudden swelling of your face, hands, or feet. ? New vision problems (such as dimness, blurring, or seeing spots). ? A severe headache.     · Your blood pressure is higher than it should be or rises suddenly.     · You have new nausea or vomiting.     · You think that you are in labor.     · You have pain in your belly or pelvis.    Watch closely for changes in your health, and be sure to contact your doctor if:    · You gain weight rapidly. Where can you learn more? Go to http://estelle-arnaldo.info/. Enter 160-103-3709 in the search box to learn more about \"High Blood Pressure in Pregnancy: Care Instructions. \"  Current as of: May 29, 2019  Content Version: 12.2  © 9669-0985 [employer]. Care instructions adapted under license by [employer] (which disclaims liability or warranty for this information).  If you have questions about a medical condition or this instruction, always ask your healthcare professional. [employer] disclaims any warranty or liability for your use of this information.

## 2020-01-13 NOTE — PROGRESS NOTES
WNAAF to L&D for NST related to hypertension- pt on labetalol 100mg BID- took at 1030 this AM- efm and toco applied- oriented to room etc- no support  present- pt coping well- rails up- call bell in reach    1610- gabriel do aware of reactive tracing per Brady rn- may d/c home    1625- gabriel do called to verify she saw the pt BP- will note BP-     1635- gabriel do over to see pt - aware of cuff change-    1655- pt aware needs to stay for lab      1729- labs sent- meds given -     1842- gabriel do in to see pt- discharge instructions given -prescription given - pt aware and able to verbally teach back kick counts-  preg precautions and hypertension - follow up in office and nst on 1/16/2020    1850- discharged home

## 2020-01-13 NOTE — PROGRESS NOTES
Virginia Hernández presented to L&D for NST. Reactive tracing, elevated BP, more so than seen previously in office. [address] labs drawn on 1/6, results nml. Pt reports taking Labetalol 100mg around 10:30-11am today. Denies HA, vision changes, or RUQ pain. Dr. Tom Lino notified of patient status, Labetalol 200 mg ordered by Dr. Tom Lino. [facility] labs pending. Discussed plan with patient to continue monitoring and will follow up once lab results available.

## 2020-01-13 NOTE — DISCHARGE SUMMARY
Antepartum Discharge Summary     Name: Maddie Spear MRN: [id number]  SSN: [id number]    YOB: 1985  Age: 30 y.o. Sex: female      Admit Date: 1/13/2020    Discharge Date: 1/13/2020     Admitting Physician: Westley Masterson MD     Attending Physician:  France Panda MD     Admission Diagnoses: Pregnancy [Z34.90]  Hypertension during pregnancy [O16.9]    Discharge Diagnoses:   Problem List as of 1/13/2020 Date Reviewed: 1/13/2020          Codes Class Noted - Resolved    Pregnancy ICD-10-CM: Z34.90  ICD-9-CM: V22.2  1/13/2020 - Present        Hypertension during pregnancy ICD-10-CM: O16.9  ICD-9-CM: 642.90  1/13/2020 - Present        Gestational hypertension ICD-10-CM: O13.9  ICD-9-CM: 642.30  1/6/2020 - Present        Obesity, morbid ([facility] [address] ICD-10-CM: E66.01  ICD-9-CM: 278.01  11/28/2017 - Present        Hyperthyroidism ICD-10-CM: E05.90  ICD-9-CM: 242.90  2/23/2016 - Present        Hypertension ICD-10-CM: I10  ICD-9-CM: 401.9  10/27/2015 - Present        Prediabetes ICD-10-CM: R73.03  ICD-9-CM: 790.29  10/27/2015 - Present        RESOLVED: Decreased fetal movement ICD-10-CM: R48.8291  ICD-9-CM: 655.70  12/10/2019 - 1/13/2020        RESOLVED: Anal fissure ICD-10-CM: K60.2  ICD-9-CM: 565.0  12/4/2015 - 2/27/2018        RESOLVED: Hemorrhoid ICD-10-CM: K64.9  ICD-9-CM: 455.6  12/2/2015 - 2/27/2018           No results found for: RUBELLAEXT, GRBSEXT    Immunization(s):   There is no immunization history on file for this patient. Hospital Course:    - Elevated BP on Labetalol.  - PIH labs nml  - reactive NST  - Not in labor, not ruptured    Patient Instructions:   Current Discharge Medication List      CONTINUE these medications which have CHANGED    Details   labetalol (NORMODYNE) 200 mg tablet Take 1 Tab by mouth two (2) times a day.   Qty: 60 Tab, Refills: 0         CONTINUE these medications which have NOT CHANGED    Details   prenatal vit-iron fumarate-fa (PRENATAL PLUS WITH IRON) 28 mg iron- 800 mcg tab Take 1 Tab by mouth daily. Qty: 100 Tab, Refills: 4    Associated Diagnoses: Hyperthyroidism; Positive pregnancy test             Reference my discharge instructions. Return to office for next scheduled appointment. Reviewed preeclampsia precautions.        Signed By:  Manuelito Fernandez CNM     January 13, 2020

## 2020-01-16 ENCOUNTER — HOSPITAL ENCOUNTER (OUTPATIENT)
Age: 35
Discharge: HOME | End: 2020-01-16
Attending: OBSTETRICS & GYNECOLOGY | Admitting: OBSTETRICS & GYNECOLOGY
Payer: COMMERCIAL

## 2020-01-16 VITALS
SYSTOLIC BLOOD PRESSURE: 114 MMHG | HEART RATE: 90 BPM | DIASTOLIC BLOOD PRESSURE: 67 MMHG | RESPIRATION RATE: 16 BRPM | TEMPERATURE: 98.6 F

## 2020-01-16 PROBLEM — O10.919 CHRONIC HYPERTENSION AFFECTING PREGNANCY: Status: ACTIVE | Noted: 2020-01-16

## 2020-01-16 LAB — GRBS, EXTERNAL: POSITIVE

## 2020-01-16 PROCEDURE — 59025 FETAL NON-STRESS TEST: CPT

## 2020-01-16 NOTE — PROGRESS NOTES
Reviewed discharge instructions. Verbalizes understanding  Of self care. kick counts, discharged to home. Follow -up as scheduled on Monday at 1830. Discharged to home.

## 2020-01-16 NOTE — PROGRESS NOTES
Pt  36w0d ambulatory to room for NST for h/o CHTN. Denies HA, dizziness, blurred vision, Denies vaginal bleeding, leakage of fluid, endorses good FM, EFM applied. 1217- RN at bedside adjusting EFM, pt turned to side. NST reactive per this RNC and TED Cid CNM in room 1252, EFM reviewed by CNM. Pt to be discharged, kick counts and labor precautions reviewed. All questions answered.

## 2020-01-16 NOTE — H&P
HPI: Mac Jenkins presents for fetal surveillance secondary to [place] on meds in pregnancy    Subjective:     Mac Jenkins, 30 y.o.   at 36w0d presents to L&D for NST after her office visit today. Assessment:  Past Medical History:   Diagnosis Date    External hemorrhoid, bleeding     Hypertension     Hyperthyroidism     PCOS (polycystic ovarian syndrome)      Past Surgical History:   Procedure Laterality Date    HX CHOLECYSTECTOMY      HX GI         Allergies   Allergen Reactions    Oxycodone Hives     Prior to Admission medications    Medication Sig Start Date End Date Taking? Authorizing Provider   labetalol (NORMODYNE) 200 mg tablet Take 1 Tab by mouth two (2) times a day. 20  Yes Hanna Neal CNM   prenatal vit-iron fumarate-fa (PRENATAL PLUS WITH IRON) 28 mg iron- 800 mcg tab Take 1 Tab by mouth daily. 19  Yes Jigna Hinton MD        Objective:     Vitals:  Vitals:    20 1205 20 1206 20 1216 20 1231   BP:  (!) 148/95 122/60 123/48   Pulse:  97 95 89   Resp: 16      Temp: 98.6 °F (37 °C)           Physical Exam:  Patient without distress. Membranes:  Intact  Fetal Heart Rate: Baseline: 130 per minute  Variability: moderate  Accelerations: yes  Decelerations: none  Contractions: none on toco      Assessment/Plan:     Assessment:   Patient Active Problem List   Diagnosis Code    Chronic hypertension affecting pregnancy O10.919     Plan: DC home with standard & ER labor precautions. Follow-up in office for routine visit next week and discuss IOL at that time.      Signed By:  Colette Valle CNM     2020

## 2020-01-20 ENCOUNTER — HOSPITAL ENCOUNTER (OUTPATIENT)
Age: 35
Discharge: HOME | End: 2020-01-20
Attending: OBSTETRICS & GYNECOLOGY | Admitting: OBSTETRICS & GYNECOLOGY
Payer: COMMERCIAL

## 2020-01-20 VITALS
TEMPERATURE: 98.4 F | RESPIRATION RATE: 16 BRPM | SYSTOLIC BLOOD PRESSURE: 142 MMHG | DIASTOLIC BLOOD PRESSURE: 85 MMHG | HEART RATE: 99 BPM

## 2020-01-20 PROCEDURE — 59025 FETAL NON-STRESS TEST: CPT

## 2020-01-20 NOTE — PROGRESS NOTES
Received ambulatory  To room 3414 with history of chronic hypertension.  EDC 20 . Denies headache, dizziness, blurred vision. Denies CT, vagianl leakage or bleeding. Admits to fetal movement.  EFM applied

## 2020-01-21 NOTE — PROGRESS NOTES
Pt discharged home with no complaints, stable and in ambulatory condition per MD orders.  Scheduled for f/u NST on 1/23/2020 at 11am.

## 2020-01-21 NOTE — PROGRESS NOTES
FHR strip reviewed with Dr Hanna Phoenix. NST reactive, pt okay to be discharged home per MD orders.

## 2020-01-21 NOTE — PROGRESS NOTES
37yo  at 36.4wks with CHTN on Labetalol here for NST. Pt without complaints. TOCO without ctxs. FHTs 130s, mod variability, +accels, no decels. NST reactive, will continue with twice weekly testing.     Visit Vitals  /85 (BP 1 Location: Left arm, BP Patient Position: At rest)   Pulse 99   Temp 98.4 °F (36.9 °C)   Resp 16   LMP  (LMP Unknown)

## 2020-01-23 ENCOUNTER — HOSPITAL ENCOUNTER (OUTPATIENT)
Age: 35
Discharge: HOME | End: 2020-01-23
Attending: OBSTETRICS & GYNECOLOGY | Admitting: OBSTETRICS & GYNECOLOGY
Payer: COMMERCIAL

## 2020-01-23 VITALS
WEIGHT: 293 LBS | TEMPERATURE: 98.2 F | RESPIRATION RATE: 18 BRPM | DIASTOLIC BLOOD PRESSURE: 90 MMHG | HEART RATE: 96 BPM | SYSTOLIC BLOOD PRESSURE: 130 MMHG | BODY MASS INDEX: 44.41 KG/M2 | HEIGHT: 68 IN

## 2020-01-23 VITALS
DIASTOLIC BLOOD PRESSURE: 97 MMHG | HEART RATE: 114 BPM | SYSTOLIC BLOOD PRESSURE: 143 MMHG | TEMPERATURE: 98.9 F | OXYGEN SATURATION: 100 %

## 2020-01-23 LAB
ALBUMIN SERPL-MCNC: 2.8 G/DL (ref 3.4–5)
ALBUMIN/GLOB SERPL: 0.8 {RATIO} (ref 0.8–1.7)
ALP SERPL-CCNC: 116 U/L (ref 45–117)
ALT SERPL-CCNC: 36 U/L (ref 13–56)
ANION GAP SERPL CALC-SCNC: 10 MMOL/L (ref 3–18)
APPEARANCE UR: CLEAR
AST SERPL-CCNC: 22 U/L (ref 10–38)
BASOPHILS # BLD: 0 K/UL (ref 0–0.1)
BASOPHILS NFR BLD: 0 % (ref 0–2)
BILIRUB DIRECT SERPL-MCNC: 0.1 MG/DL (ref 0–0.2)
BILIRUB SERPL-MCNC: 0.4 MG/DL (ref 0.2–1)
BILIRUB UR QL: NEGATIVE
BUN SERPL-MCNC: 8 MG/DL (ref 7–18)
BUN/CREAT SERPL: 12 (ref 12–20)
CALCIUM SERPL-MCNC: 9 MG/DL (ref 8.5–10.1)
CHLORIDE SERPL-SCNC: 107 MMOL/L (ref 100–111)
CO2 SERPL-SCNC: 22 MMOL/L (ref 21–32)
COLOR UR: YELLOW
CREAT SERPL-MCNC: 0.65 MG/DL (ref 0.6–1.3)
CREAT UR-MCNC: 116 MG/DL (ref 30–125)
DIFFERENTIAL METHOD BLD: ABNORMAL
EOSINOPHIL # BLD: 0.1 K/UL (ref 0–0.4)
EOSINOPHIL NFR BLD: 1 % (ref 0–5)
ERYTHROCYTE [DISTWIDTH] IN BLOOD BY AUTOMATED COUNT: 14.1 % (ref 11.6–14.5)
GLOBULIN SER CALC-MCNC: 3.7 G/DL (ref 2–4)
GLUCOSE SERPL-MCNC: 86 MG/DL (ref 74–99)
GLUCOSE UR STRIP.AUTO-MCNC: NEGATIVE MG/DL
HCT VFR BLD AUTO: 33.2 % (ref 35–45)
HGB BLD-MCNC: 11.1 G/DL (ref 12–16)
HGB UR QL STRIP: NEGATIVE
KETONES UR QL STRIP.AUTO: NEGATIVE MG/DL
LEUKOCYTE ESTERASE UR QL STRIP.AUTO: NEGATIVE
LYMPHOCYTES # BLD: 1.6 K/UL (ref 0.9–3.6)
LYMPHOCYTES NFR BLD: 14 % (ref 21–52)
MCH RBC QN AUTO: 23.1 PG (ref 24–34)
MCHC RBC AUTO-ENTMCNC: 33.4 G/DL (ref 31–37)
MCV RBC AUTO: 69 FL (ref 74–97)
MONOCYTES # BLD: 1 K/UL (ref 0.05–1.2)
MONOCYTES NFR BLD: 9 % (ref 3–10)
NEUTS SEG # BLD: 9.1 K/UL (ref 1.8–8)
NEUTS SEG NFR BLD: 76 % (ref 40–73)
NITRITE UR QL STRIP.AUTO: NEGATIVE
PH UR STRIP: 6 [PH] (ref 5–8)
PLATELET # BLD AUTO: 352 K/UL (ref 135–420)
PMV BLD AUTO: 10.2 FL (ref 9.2–11.8)
POTASSIUM SERPL-SCNC: 3.9 MMOL/L (ref 3.5–5.5)
PROT SERPL-MCNC: 6.5 G/DL (ref 6.4–8.2)
PROT UR STRIP-MCNC: NEGATIVE MG/DL
PROT UR-MCNC: 9 MG/DL
PROT/CREAT UR-RTO: 0.1
RBC # BLD AUTO: 4.81 M/UL (ref 4.2–5.3)
SODIUM SERPL-SCNC: 139 MMOL/L (ref 136–145)
SP GR UR REFRACTOMETRY: 1.01 (ref 1–1.03)
URATE SERPL-MCNC: 4.8 MG/DL (ref 2.6–7.2)
UROBILINOGEN UR QL STRIP.AUTO: 0.2 EU/DL (ref 0.2–1)
WBC # BLD AUTO: 11.8 K/UL (ref 4.6–13.2)

## 2020-01-23 PROCEDURE — 59025 FETAL NON-STRESS TEST: CPT

## 2020-01-23 PROCEDURE — 84156 ASSAY OF PROTEIN URINE: CPT

## 2020-01-23 PROCEDURE — 83615 LACTATE (LD) (LDH) ENZYME: CPT

## 2020-01-23 PROCEDURE — 81003 URINALYSIS AUTO W/O SCOPE: CPT

## 2020-01-23 PROCEDURE — 80048 BASIC METABOLIC PNL TOTAL CA: CPT

## 2020-01-23 PROCEDURE — 80076 HEPATIC FUNCTION PANEL: CPT

## 2020-01-23 PROCEDURE — 99282 EMERGENCY DEPT VISIT SF MDM: CPT

## 2020-01-23 PROCEDURE — 85025 COMPLETE CBC W/AUTO DIFF WBC: CPT

## 2020-01-23 PROCEDURE — 84550 ASSAY OF BLOOD/URIC ACID: CPT

## 2020-01-23 NOTE — PROGRESS NOTES
1048: pt arrives to L&D ambulatory from clinic for scheduled NST. Pt denies headache, vision changes, vaginal bleeding or leakage of fluid. Reports fetal movement. EFM and toco applied. 1150: Belinda ROBERT at bedside, reviewed strip and order to discharge home. 1155: discharge instructions reviewed and given to patient. Pregnancy precautions, kick counts and labor precautions reviewed and pt verbalized understanding. Pt discharged home ambulatory in stable condition.

## 2020-01-23 NOTE — PROGRESS NOTES
Pt arrives ambulatory from home reporting decreased FM. Was seen earlier for reactive NST and states she has not felt FM since that time (1200). Denies vaginal bleeding or LOF. EFM placed 1711-     Initial /99 Pt denies HA, blurred vision, R sided pain. pt reports being anxious about decreased fetal movement. BP set to cycle [id number]. Second BP severe range. [id number] paged EFM strip reviewed with provider. BP's reviewed. CNM will consult Dr and call L&D back. 9135- Pt updated, states that previously she had \"a really high BP\" in triage on another visit and when a different cuff was tried the BP reading was much lower. Cuff changed, /95. CNM paged. 1- Verbal order for [facility] labs per CNDIMITRI.      [address] drawn and sent  0710- RN at bedside adjusting monitor

## 2020-01-24 ENCOUNTER — HOSPITAL ENCOUNTER (INPATIENT)
Age: 35
LOS: 4 days | Discharge: HOME | End: 2020-01-28
Attending: OBSTETRICS & GYNECOLOGY | Admitting: OBSTETRICS & GYNECOLOGY
Payer: COMMERCIAL

## 2020-01-24 PROBLEM — O98.819 GROUP B STREPTOCOCCAL INFECTION DURING PREGNANCY: Status: ACTIVE | Noted: 2020-01-24

## 2020-01-24 PROBLEM — B95.1 GROUP B STREPTOCOCCAL INFECTION DURING PREGNANCY: Status: ACTIVE | Noted: 2020-01-24

## 2020-01-24 LAB
ABO + RH BLD: NORMAL
ALBUMIN SERPL-MCNC: 2.8 G/DL (ref 3.4–5)
ALBUMIN/GLOB SERPL: 0.7 {RATIO} (ref 0.8–1.7)
ALP SERPL-CCNC: 119 U/L (ref 45–117)
ALT SERPL-CCNC: 36 U/L (ref 13–56)
ANION GAP SERPL CALC-SCNC: 8 MMOL/L (ref 3–18)
AST SERPL-CCNC: 23 U/L (ref 10–38)
BASOPHILS # BLD: 0 K/UL (ref 0–0.1)
BASOPHILS NFR BLD: 0 % (ref 0–2)
BILIRUB SERPL-MCNC: 0.4 MG/DL (ref 0.2–1)
BLOOD GROUP ANTIBODIES SERPL: NORMAL
BUN SERPL-MCNC: 9 MG/DL (ref 7–18)
BUN/CREAT SERPL: 12 (ref 12–20)
CALCIUM SERPL-MCNC: 9.2 MG/DL (ref 8.5–10.1)
CHLORIDE SERPL-SCNC: 108 MMOL/L (ref 100–111)
CO2 SERPL-SCNC: 23 MMOL/L (ref 21–32)
CREAT SERPL-MCNC: 0.76 MG/DL (ref 0.6–1.3)
DIFFERENTIAL METHOD BLD: ABNORMAL
EOSINOPHIL # BLD: 0 K/UL (ref 0–0.4)
EOSINOPHIL NFR BLD: 0 % (ref 0–5)
ERYTHROCYTE [DISTWIDTH] IN BLOOD BY AUTOMATED COUNT: 14 % (ref 11.6–14.5)
GLOBULIN SER CALC-MCNC: 3.9 G/DL (ref 2–4)
GLUCOSE SERPL-MCNC: 71 MG/DL (ref 74–99)
HCT VFR BLD AUTO: 34.4 % (ref 35–45)
HGB BLD-MCNC: 11.1 G/DL (ref 12–16)
LDH SERPL L TO P-CCNC: 164 U/L (ref 81–234)
LYMPHOCYTES # BLD: 1.8 K/UL (ref 0.9–3.6)
LYMPHOCYTES NFR BLD: 16 % (ref 21–52)
MCH RBC QN AUTO: 22.6 PG (ref 24–34)
MCHC RBC AUTO-ENTMCNC: 32.3 G/DL (ref 31–37)
MCV RBC AUTO: 70.1 FL (ref 74–97)
MONOCYTES # BLD: 0.8 K/UL (ref 0.05–1.2)
MONOCYTES NFR BLD: 7 % (ref 3–10)
NEUTS SEG # BLD: 9 K/UL (ref 1.8–8)
NEUTS SEG NFR BLD: 77 % (ref 40–73)
PLATELET # BLD AUTO: 346 K/UL (ref 135–420)
PMV BLD AUTO: 10.2 FL (ref 9.2–11.8)
POTASSIUM SERPL-SCNC: 4.2 MMOL/L (ref 3.5–5.5)
PROT SERPL-MCNC: 6.7 G/DL (ref 6.4–8.2)
RBC # BLD AUTO: 4.91 M/UL (ref 4.2–5.3)
SODIUM SERPL-SCNC: 139 MMOL/L (ref 136–145)
SPECIMEN EXP DATE BLD: NORMAL
URATE SERPL-MCNC: 4.9 MG/DL (ref 2.6–7.2)
WBC # BLD AUTO: 11.7 K/UL (ref 4.6–13.2)

## 2020-01-24 PROCEDURE — 84550 ASSAY OF BLOOD/URIC ACID: CPT

## 2020-01-24 PROCEDURE — 10907ZC DRAINAGE OF AMNIOTIC FLUID, THERAPEUTIC FROM PRODUCTS OF CONCEPTION, VIA NATURAL OR ARTIFICIAL OPENING: ICD-10-PCS | Performed by: OBSTETRICS & GYNECOLOGY

## 2020-01-24 PROCEDURE — 65270000029 HC RM PRIVATE

## 2020-01-24 PROCEDURE — 74011250637 HC RX REV CODE- 250/637: Performed by: ADVANCED PRACTICE MIDWIFE

## 2020-01-24 PROCEDURE — 80053 COMPREHEN METABOLIC PANEL: CPT

## 2020-01-24 PROCEDURE — 86900 BLOOD TYPING SEROLOGIC ABO: CPT

## 2020-01-24 PROCEDURE — 85025 COMPLETE CBC W/AUTO DIFF WBC: CPT

## 2020-01-24 PROCEDURE — 3E0P7VZ INTRODUCTION OF HORMONE INTO FEMALE REPRODUCTIVE, VIA NATURAL OR ARTIFICIAL OPENING: ICD-10-PCS | Performed by: OBSTETRICS & GYNECOLOGY

## 2020-01-24 RX ORDER — OXYTOCIN/0.9 % SODIUM CHLORIDE 30/500 ML
0-20 PLASTIC BAG, INJECTION (ML) INTRAVENOUS
Status: DISCONTINUED | OUTPATIENT
Start: 2020-01-25 | End: 2020-01-25

## 2020-01-24 RX ORDER — MISOPROSTOL 200 UG/1
800 TABLET ORAL
Status: DISPENSED | OUTPATIENT
Start: 2020-01-24 | End: 2020-01-25

## 2020-01-24 RX ORDER — OXYTOCIN/RINGER'S LACTATE 20/1000 ML
125 PLASTIC BAG, INJECTION (ML) INTRAVENOUS CONTINUOUS
Status: DISCONTINUED | OUTPATIENT
Start: 2020-01-24 | End: 2020-01-26 | Stop reason: HOSPADM

## 2020-01-24 RX ORDER — NALBUPHINE HYDROCHLORIDE 10 MG/ML
10 INJECTION, SOLUTION INTRAMUSCULAR; INTRAVENOUS; SUBCUTANEOUS
Status: ACTIVE | OUTPATIENT
Start: 2020-01-24 | End: 2020-01-25

## 2020-01-24 RX ORDER — OXYTOCIN/RINGER'S LACTATE 20/1000 ML
999 PLASTIC BAG, INJECTION (ML) INTRAVENOUS ONCE
Status: ACTIVE | OUTPATIENT
Start: 2020-01-24 | End: 2020-01-25

## 2020-01-24 RX ORDER — SALICYLIC ACID
90 POWDER (GRAM) MISCELLANEOUS ONCE
Status: ACTIVE | OUTPATIENT
Start: 2020-01-24 | End: 2020-01-25

## 2020-01-24 RX ORDER — OXYTOCIN 10 [USP'U]/ML
10 INJECTION, SOLUTION INTRAMUSCULAR; INTRAVENOUS
Status: ACTIVE | OUTPATIENT
Start: 2020-01-24 | End: 2020-01-25

## 2020-01-24 RX ORDER — NALOXONE HYDROCHLORIDE 0.4 MG/ML
0.4 INJECTION, SOLUTION INTRAMUSCULAR; INTRAVENOUS; SUBCUTANEOUS AS NEEDED
Status: DISCONTINUED | OUTPATIENT
Start: 2020-01-24 | End: 2020-01-25

## 2020-01-24 RX ORDER — METHYLERGONOVINE MALEATE 0.2 MG/ML
0.2 INJECTION INTRAVENOUS AS NEEDED
Status: DISCONTINUED | OUTPATIENT
Start: 2020-01-24 | End: 2020-01-26 | Stop reason: HOSPADM

## 2020-01-24 RX ORDER — LIDOCAINE HYDROCHLORIDE 10 MG/ML
20 INJECTION, SOLUTION EPIDURAL; INFILTRATION; INTRACAUDAL; PERINEURAL AS NEEDED
Status: DISCONTINUED | OUTPATIENT
Start: 2020-01-24 | End: 2020-01-26 | Stop reason: HOSPADM

## 2020-01-24 RX ORDER — MISOPROSTOL 100 UG/1
25 TABLET ORAL EVERY 4 HOURS
Status: COMPLETED | OUTPATIENT
Start: 2020-01-24 | End: 2020-01-25

## 2020-01-24 RX ORDER — LABETALOL 200 MG/1
200 TABLET, FILM COATED ORAL 2 TIMES DAILY
Status: DISCONTINUED | OUTPATIENT
Start: 2020-01-24 | End: 2020-01-28 | Stop reason: HOSPADM

## 2020-01-24 RX ORDER — TERBUTALINE SULFATE 1 MG/ML
0.25 INJECTION SUBCUTANEOUS
Status: DISCONTINUED | OUTPATIENT
Start: 2020-01-24 | End: 2020-01-26 | Stop reason: HOSPADM

## 2020-01-24 RX ORDER — MAG HYDROX/ALUMINUM HYD/SIMETH 200-200-20
15 SUSPENSION, ORAL (FINAL DOSE FORM) ORAL
Status: DISCONTINUED | OUTPATIENT
Start: 2020-01-24 | End: 2020-01-26 | Stop reason: HOSPADM

## 2020-01-24 RX ORDER — SODIUM CHLORIDE, SODIUM LACTATE, POTASSIUM CHLORIDE, CALCIUM CHLORIDE 600; 310; 30; 20 MG/100ML; MG/100ML; MG/100ML; MG/100ML
125 INJECTION, SOLUTION INTRAVENOUS CONTINUOUS
Status: DISCONTINUED | OUTPATIENT
Start: 2020-01-24 | End: 2020-01-26 | Stop reason: HOSPADM

## 2020-01-24 RX ORDER — ZOLPIDEM TARTRATE 5 MG/1
5 TABLET ORAL
Status: DISCONTINUED | OUTPATIENT
Start: 2020-01-24 | End: 2020-01-28 | Stop reason: HOSPADM

## 2020-01-24 RX ADMIN — MISOPROSTOL 25 MCG: 100 TABLET ORAL at 20:43

## 2020-01-24 RX ADMIN — ZOLPIDEM TARTRATE 5 MG: 5 TABLET ORAL at 21:59

## 2020-01-24 RX ADMIN — LABETALOL HCL 200 MG: 200 TABLET, FILM COATED ORAL at 21:58

## 2020-01-24 NOTE — PROGRESS NOTES
Labor Progress Note  Patient seen, fetal heart rate and contraction pattern evaluated, patient examined. Patient Vitals for the past 4 hrs:   Temp Pulse BP SpO2   20 98.9 °F (37.2 °C) -- -- --   20 -- (!) 114 (!) 143/97 --   20 -- (!) 118 138/88 --   20 -- 99 (!) 151/96 --   20 1853 -- (!) 109 (!) 144/100 --   20 1754 -- (!) 104 (!) 137/95 100 %   20 1738 -- -- (!) 174/104 100 %   20 1720 98 °F (36.7 °C) -- -- --   20 1719 -- -- -- 100 %   20 171 -- (!) 117 (!) 162/99 --          Physical Exam:  Cervical Exam:  deferred  Membranes:  Intact  Uterine Activity: None  Fetal Heart Rate: Baseline: 135 per minute  Variability: moderate  Accelerations: yes  Decelerations: none    Assessment:   OB History        1    Para        Term                AB        Living           SAB        TAB        Ectopic        Molar        Multiple        Live Births                   IUP 37w0d; GHTN, RNST- PIH WNL Protein/Creatinine ratio 0.1  Consulted with Dr. Stephan Marina. Plan:D/C to home. F/u Thursday for NST. May consider moving up IOL.    Xaio Monreal CNM

## 2020-01-24 NOTE — PROGRESS NOTES
35yo  at 37.0wks with CHTN on Labetalol here for NST. Was seen earlier today for NST, but had decreased movement after being at home so came back. TOCO without ctxs. FHTs 130s, mod variability, +accels, no decels. NST reactive, initial Bps elevated, but improved with switching to a better fitting BP cuff. Patient Vitals for the past 4 hrs:   Temp Pulse BP SpO2   20 1910 -- 99 (!) 151/96 --   20 1853 -- (!) 109 (!) 144/100 --   20 1754 -- (!) 104 (!) 137/95 100 %   20 1738 -- -- (!) 174/104 100 %   20 1720 98 °F (36.7 °C) -- -- --   20 1719 -- -- -- 100 %   20 1717 -- (!) 117 (!) 162/99 --         Consult with Dr. Abdulaziz Zabala, [facility] labs drawn and now pending. Plan to D/C to home if labs stable. Set for IOL on .      Pravin Nettles CNM

## 2020-01-24 NOTE — PROGRESS NOTES
Bedside and Verbal shift change report given to Nakul Mahoney RN (oncoming nurse) by Taya Hendrix RN (offgoing nurse). Report included the following information SBAR, Kardex and MAR.

## 2020-01-25 ENCOUNTER — ANESTHESIA (OUTPATIENT)
Dept: LABOR AND DELIVERY | Age: 35
End: 2020-01-25
Payer: COMMERCIAL

## 2020-01-25 ENCOUNTER — ANESTHESIA EVENT (OUTPATIENT)
Dept: LABOR AND DELIVERY | Age: 35
End: 2020-01-25
Payer: COMMERCIAL

## 2020-01-25 PROCEDURE — 74011000250 HC RX REV CODE- 250: Performed by: NURSE ANESTHETIST, CERTIFIED REGISTERED

## 2020-01-25 PROCEDURE — 76010000391 HC C SECN FIRST 1 HR: Performed by: OBSTETRICS & GYNECOLOGY

## 2020-01-25 PROCEDURE — 74011000250 HC RX REV CODE- 250: Performed by: ANESTHESIOLOGY

## 2020-01-25 PROCEDURE — 74011250636 HC RX REV CODE- 250/636: Performed by: OBSTETRICS & GYNECOLOGY

## 2020-01-25 PROCEDURE — 74011250636 HC RX REV CODE- 250/636: Performed by: ANESTHESIOLOGY

## 2020-01-25 PROCEDURE — 75410000003 HC RECOV DEL/VAG/CSECN EA 0.5 HR: Performed by: OBSTETRICS & GYNECOLOGY

## 2020-01-25 PROCEDURE — 77030040361 HC SLV COMPR DVT MDII -B

## 2020-01-25 PROCEDURE — 74011000258 HC RX REV CODE- 258: Performed by: NURSE ANESTHETIST, CERTIFIED REGISTERED

## 2020-01-25 PROCEDURE — 74011250636 HC RX REV CODE- 250/636: Performed by: NURSE ANESTHETIST, CERTIFIED REGISTERED

## 2020-01-25 PROCEDURE — 74011250636 HC RX REV CODE- 250/636: Performed by: ADVANCED PRACTICE MIDWIFE

## 2020-01-25 PROCEDURE — 74011250637 HC RX REV CODE- 250/637: Performed by: NURSE ANESTHETIST, CERTIFIED REGISTERED

## 2020-01-25 PROCEDURE — 77030040830 HC CATH URETH FOL MDII -A

## 2020-01-25 PROCEDURE — 77030007879 HC KT SPN EPDRL TELE -B: Performed by: ANESTHESIOLOGY

## 2020-01-25 PROCEDURE — 74011000258 HC RX REV CODE- 258: Performed by: OBSTETRICS & GYNECOLOGY

## 2020-01-25 PROCEDURE — 74011250637 HC RX REV CODE- 250/637: Performed by: ADVANCED PRACTICE MIDWIFE

## 2020-01-25 PROCEDURE — 76010000392 HC C SECN EA ADDL 0.5 HR: Performed by: OBSTETRICS & GYNECOLOGY

## 2020-01-25 PROCEDURE — 65270000029 HC RM PRIVATE

## 2020-01-25 PROCEDURE — 76060000078 HC EPIDURAL ANESTHESIA: Performed by: OBSTETRICS & GYNECOLOGY

## 2020-01-25 PROCEDURE — 77030018836 HC SOL IRR NACL ICUM -A

## 2020-01-25 RX ORDER — BUPIVACAINE HYDROCHLORIDE 5 MG/ML
INJECTION, SOLUTION EPIDURAL; INTRACAUDAL AS NEEDED
Status: DISCONTINUED | OUTPATIENT
Start: 2020-01-25 | End: 2020-01-25 | Stop reason: HOSPADM

## 2020-01-25 RX ORDER — CEFAZOLIN SODIUM 2 G/50ML
SOLUTION INTRAVENOUS
Status: DISCONTINUED
Start: 2020-01-25 | End: 2020-01-25

## 2020-01-25 RX ORDER — SODIUM CHLORIDE 9 MG/ML
250 INJECTION, SOLUTION INTRAVENOUS ONCE
Status: COMPLETED | OUTPATIENT
Start: 2020-01-25 | End: 2020-01-25

## 2020-01-25 RX ORDER — SODIUM CHLORIDE 0.9 % (FLUSH) 0.9 %
5-40 SYRINGE (ML) INJECTION EVERY 8 HOURS
Status: DISCONTINUED | OUTPATIENT
Start: 2020-01-26 | End: 2020-01-26

## 2020-01-25 RX ORDER — SODIUM CHLORIDE 900 MG/100ML
INJECTION INTRAVENOUS
Status: DISCONTINUED
Start: 2020-01-25 | End: 2020-01-25

## 2020-01-25 RX ORDER — MORPHINE SULFATE 1 MG/ML
INJECTION, SOLUTION EPIDURAL; INTRATHECAL; INTRAVENOUS AS NEEDED
Status: DISCONTINUED | OUTPATIENT
Start: 2020-01-25 | End: 2020-01-25 | Stop reason: HOSPADM

## 2020-01-25 RX ORDER — TRISODIUM CITRATE DIHYDRATE AND CITRIC ACID MONOHYDRATE 500; 334 MG/5ML; MG/5ML
15 SOLUTION ORAL
Status: COMPLETED | OUTPATIENT
Start: 2020-01-25 | End: 2020-01-25

## 2020-01-25 RX ORDER — FENTANYL CITRATE 50 UG/ML
INJECTION, SOLUTION INTRAMUSCULAR; INTRAVENOUS AS NEEDED
Status: DISCONTINUED | OUTPATIENT
Start: 2020-01-25 | End: 2020-01-25 | Stop reason: HOSPADM

## 2020-01-25 RX ORDER — LIDOCAINE HYDROCHLORIDE AND EPINEPHRINE 15; 5 MG/ML; UG/ML
INJECTION, SOLUTION EPIDURAL
Status: COMPLETED | OUTPATIENT
Start: 2020-01-25 | End: 2020-01-25

## 2020-01-25 RX ORDER — SODIUM CHLORIDE, SODIUM LACTATE, POTASSIUM CHLORIDE, CALCIUM CHLORIDE 600; 310; 30; 20 MG/100ML; MG/100ML; MG/100ML; MG/100ML
INJECTION, SOLUTION INTRAVENOUS
Status: DISCONTINUED | OUTPATIENT
Start: 2020-01-25 | End: 2020-01-25 | Stop reason: HOSPADM

## 2020-01-25 RX ORDER — SALICYLIC ACID
30 POWDER (GRAM) MISCELLANEOUS AS NEEDED
Status: DISCONTINUED | OUTPATIENT
Start: 2020-01-25 | End: 2020-01-25

## 2020-01-25 RX ORDER — OXYTOCIN/RINGER'S LACTATE 20/1000 ML
PLASTIC BAG, INJECTION (ML) INTRAVENOUS
Status: DISCONTINUED | OUTPATIENT
Start: 2020-01-25 | End: 2020-01-25 | Stop reason: HOSPADM

## 2020-01-25 RX ORDER — ONDANSETRON 2 MG/ML
INJECTION INTRAMUSCULAR; INTRAVENOUS AS NEEDED
Status: DISCONTINUED | OUTPATIENT
Start: 2020-01-25 | End: 2020-01-25 | Stop reason: HOSPADM

## 2020-01-25 RX ORDER — FENTANYL CITRATE 50 UG/ML
INJECTION, SOLUTION INTRAMUSCULAR; INTRAVENOUS
Status: COMPLETED
Start: 2020-01-25 | End: 2020-01-25

## 2020-01-25 RX ORDER — SODIUM CHLORIDE, SODIUM LACTATE, POTASSIUM CHLORIDE, CALCIUM CHLORIDE 600; 310; 30; 20 MG/100ML; MG/100ML; MG/100ML; MG/100ML
125 INJECTION, SOLUTION INTRAVENOUS CONTINUOUS
Status: DISPENSED | OUTPATIENT
Start: 2020-01-26 | End: 2020-01-26

## 2020-01-25 RX ORDER — EPHEDRINE SULFATE/0.9% NACL/PF 50 MG/5 ML
SYRINGE (ML) INTRAVENOUS AS NEEDED
Status: DISCONTINUED | OUTPATIENT
Start: 2020-01-25 | End: 2020-01-25 | Stop reason: HOSPADM

## 2020-01-25 RX ORDER — PROMETHAZINE HYDROCHLORIDE 25 MG/ML
25 INJECTION, SOLUTION INTRAMUSCULAR; INTRAVENOUS
Status: DISCONTINUED | OUTPATIENT
Start: 2020-01-25 | End: 2020-01-28 | Stop reason: HOSPADM

## 2020-01-25 RX ORDER — ONDANSETRON 2 MG/ML
4 INJECTION INTRAMUSCULAR; INTRAVENOUS
Status: DISCONTINUED | OUTPATIENT
Start: 2020-01-25 | End: 2020-01-25

## 2020-01-25 RX ORDER — OXYTOCIN/0.9 % SODIUM CHLORIDE 30/500 ML
0-4 PLASTIC BAG, INJECTION (ML) INTRAVENOUS
Status: DISCONTINUED | OUTPATIENT
Start: 2020-01-25 | End: 2020-01-25

## 2020-01-25 RX ORDER — OXYTOCIN/RINGER'S LACTATE 20/1000 ML
125 PLASTIC BAG, INJECTION (ML) INTRAVENOUS CONTINUOUS
Status: DISCONTINUED | OUTPATIENT
Start: 2020-01-26 | End: 2020-01-28 | Stop reason: HOSPADM

## 2020-01-25 RX ORDER — IBUPROFEN 400 MG/1
800 TABLET ORAL
Status: DISCONTINUED | OUTPATIENT
Start: 2020-01-28 | End: 2020-01-27

## 2020-01-25 RX ORDER — SODIUM CHLORIDE, SODIUM LACTATE, POTASSIUM CHLORIDE, CALCIUM CHLORIDE 600; 310; 30; 20 MG/100ML; MG/100ML; MG/100ML; MG/100ML
100 INJECTION, SOLUTION INTRAVENOUS CONTINUOUS
Status: DISCONTINUED | OUTPATIENT
Start: 2020-01-25 | End: 2020-01-25

## 2020-01-25 RX ORDER — NALBUPHINE HYDROCHLORIDE 10 MG/ML
5 INJECTION, SOLUTION INTRAMUSCULAR; INTRAVENOUS; SUBCUTANEOUS
Status: DISCONTINUED | OUTPATIENT
Start: 2020-01-25 | End: 2020-01-25

## 2020-01-25 RX ORDER — SODIUM CHLORIDE 0.9 % (FLUSH) 0.9 %
5-40 SYRINGE (ML) INJECTION EVERY 8 HOURS
Status: DISCONTINUED | OUTPATIENT
Start: 2020-01-25 | End: 2020-01-25

## 2020-01-25 RX ORDER — ACETAMINOPHEN 325 MG/1
650 TABLET ORAL
Status: DISCONTINUED | OUTPATIENT
Start: 2020-01-25 | End: 2020-01-28 | Stop reason: HOSPADM

## 2020-01-25 RX ORDER — NALBUPHINE HYDROCHLORIDE 10 MG/ML
2.5 INJECTION, SOLUTION INTRAMUSCULAR; INTRAVENOUS; SUBCUTANEOUS
Status: DISCONTINUED | OUTPATIENT
Start: 2020-01-25 | End: 2020-01-25

## 2020-01-25 RX ORDER — DIPHENHYDRAMINE HYDROCHLORIDE 50 MG/ML
25 INJECTION, SOLUTION INTRAMUSCULAR; INTRAVENOUS
Status: DISCONTINUED | OUTPATIENT
Start: 2020-01-25 | End: 2020-01-25

## 2020-01-25 RX ORDER — HYDROCODONE BITARTRATE AND ACETAMINOPHEN 5; 325 MG/1; MG/1
1-2 TABLET ORAL
Status: DISCONTINUED | OUTPATIENT
Start: 2020-01-25 | End: 2020-01-28 | Stop reason: HOSPADM

## 2020-01-25 RX ORDER — DOCUSATE SODIUM 100 MG/1
100 CAPSULE, LIQUID FILLED ORAL 2 TIMES DAILY
Status: DISCONTINUED | OUTPATIENT
Start: 2020-01-26 | End: 2020-01-28 | Stop reason: HOSPADM

## 2020-01-25 RX ORDER — KETOROLAC TROMETHAMINE 30 MG/ML
30 INJECTION, SOLUTION INTRAMUSCULAR; INTRAVENOUS EVERY 6 HOURS
Status: DISCONTINUED | OUTPATIENT
Start: 2020-01-26 | End: 2020-01-27

## 2020-01-25 RX ORDER — SODIUM CHLORIDE 9 MG/ML
100 INJECTION, SOLUTION INTRAVENOUS CONTINUOUS
Status: DISCONTINUED | OUTPATIENT
Start: 2020-01-25 | End: 2020-01-25

## 2020-01-25 RX ORDER — ONDANSETRON 2 MG/ML
4 INJECTION INTRAMUSCULAR; INTRAVENOUS
Status: DISCONTINUED | OUTPATIENT
Start: 2020-01-25 | End: 2020-01-28 | Stop reason: HOSPADM

## 2020-01-25 RX ORDER — SODIUM CHLORIDE 0.9 % (FLUSH) 0.9 %
5-40 SYRINGE (ML) INJECTION AS NEEDED
Status: DISCONTINUED | OUTPATIENT
Start: 2020-01-25 | End: 2020-01-25

## 2020-01-25 RX ORDER — FENTANYL/ROPIVACAINE/NS/PF 2MCG/ML-.1
1-15 PLASTIC BAG, INJECTION (ML) EPIDURAL
Status: DISCONTINUED | OUTPATIENT
Start: 2020-01-25 | End: 2020-01-25 | Stop reason: CLARIF

## 2020-01-25 RX ORDER — LIDOCAINE HYDROCHLORIDE AND EPINEPHRINE 15; 5 MG/ML; UG/ML
INJECTION, SOLUTION EPIDURAL AS NEEDED
Status: DISCONTINUED | OUTPATIENT
Start: 2020-01-25 | End: 2020-01-25 | Stop reason: HOSPADM

## 2020-01-25 RX ORDER — BUPIVACAINE HYDROCHLORIDE 2.5 MG/ML
INJECTION, SOLUTION EPIDURAL; INFILTRATION; INTRACAUDAL AS NEEDED
Status: DISCONTINUED | OUTPATIENT
Start: 2020-01-25 | End: 2020-01-25 | Stop reason: HOSPADM

## 2020-01-25 RX ORDER — OXYTOCIN/0.9 % SODIUM CHLORIDE 30/500 ML
0-20 PLASTIC BAG, INJECTION (ML) INTRAVENOUS
Status: DISCONTINUED | OUTPATIENT
Start: 2020-01-25 | End: 2020-01-25

## 2020-01-25 RX ORDER — FACIAL-BODY WIPES
10 EACH TOPICAL
Status: DISCONTINUED | OUTPATIENT
Start: 2020-01-25 | End: 2020-01-28 | Stop reason: HOSPADM

## 2020-01-25 RX ORDER — NALOXONE HYDROCHLORIDE 0.4 MG/ML
0.2 INJECTION, SOLUTION INTRAMUSCULAR; INTRAVENOUS; SUBCUTANEOUS
Status: DISCONTINUED | OUTPATIENT
Start: 2020-01-25 | End: 2020-01-25

## 2020-01-25 RX ORDER — SODIUM CHLORIDE 0.9 % (FLUSH) 0.9 %
5-40 SYRINGE (ML) INJECTION AS NEEDED
Status: DISCONTINUED | OUTPATIENT
Start: 2020-01-25 | End: 2020-01-28 | Stop reason: HOSPADM

## 2020-01-25 RX ORDER — CEFAZOLIN SODIUM 1 G/3ML
INJECTION, POWDER, FOR SOLUTION INTRAMUSCULAR; INTRAVENOUS
Status: DISCONTINUED
Start: 2020-01-25 | End: 2020-01-25

## 2020-01-25 RX ORDER — SIMETHICONE 80 MG
80 TABLET,CHEWABLE ORAL
Status: DISCONTINUED | OUTPATIENT
Start: 2020-01-25 | End: 2020-01-28 | Stop reason: HOSPADM

## 2020-01-25 RX ADMIN — Medication 10 ML/HR: at 14:53

## 2020-01-25 RX ADMIN — FENTANYL CITRATE 100 MCG: 50 INJECTION, SOLUTION INTRAMUSCULAR; INTRAVENOUS at 21:13

## 2020-01-25 RX ADMIN — Medication 500 ML/HR: at 21:50

## 2020-01-25 RX ADMIN — MORPHINE SULFATE 2 MG: 1 INJECTION, SOLUTION EPIDURAL; INTRATHECAL; INTRAVENOUS at 22:02

## 2020-01-25 RX ADMIN — PENICILLIN G POTASSIUM 2.5 MILLION UNITS: 20000000 POWDER, FOR SOLUTION INTRAVENOUS at 14:42

## 2020-01-25 RX ADMIN — OXYTOCIN-SODIUM CHLORIDE 0.9% IV SOLN 30 UNIT/500ML 1 MILLI-UNITS/MIN: 30-0.9/5 SOLUTION at 11:47

## 2020-01-25 RX ADMIN — VITAMIN A, VITAMIN C, VITAMIN D-3, VITAMIN E, VITAMIN B-1, VITAMIN B-2, NIACIN, VITAMIN B-6, CALCIUM, IRON, ZINC, COPPER 1 TABLET: 4000; 120; 400; 22; 1.84; 3; 20; 10; 1; 12; 200; 27; 25; 2 TABLET ORAL at 09:38

## 2020-01-25 RX ADMIN — CEFAZOLIN 2 G: 10 INJECTION, POWDER, FOR SOLUTION INTRAVENOUS at 21:36

## 2020-01-25 RX ADMIN — MORPHINE SULFATE 2 MG: 1 INJECTION, SOLUTION EPIDURAL; INTRATHECAL; INTRAVENOUS at 22:04

## 2020-01-25 RX ADMIN — SODIUM CHLORIDE, SODIUM LACTATE, POTASSIUM CHLORIDE, AND CALCIUM CHLORIDE 125 ML/HR: 600; 310; 30; 20 INJECTION, SOLUTION INTRAVENOUS at 14:26

## 2020-01-25 RX ADMIN — ONDANSETRON 4 MG: 2 SOLUTION INTRAMUSCULAR; INTRAVENOUS at 21:41

## 2020-01-25 RX ADMIN — Medication 10 ML/HR: at 14:30

## 2020-01-25 RX ADMIN — SODIUM CHLORIDE 100 ML/HR: 900 INJECTION, SOLUTION INTRAVENOUS at 16:20

## 2020-01-25 RX ADMIN — MISOPROSTOL 25 MCG: 100 TABLET ORAL at 04:33

## 2020-01-25 RX ADMIN — FENTANYL CITRATE 100 MCG: 50 INJECTION, SOLUTION INTRAMUSCULAR; INTRAVENOUS at 14:20

## 2020-01-25 RX ADMIN — PENICILLIN G POTASSIUM 2.5 MILLION UNITS: 20000000 POWDER, FOR SOLUTION INTRAVENOUS at 18:47

## 2020-01-25 RX ADMIN — CEFAZOLIN 1 G: 1 INJECTION, POWDER, FOR SOLUTION INTRAVENOUS at 21:46

## 2020-01-25 RX ADMIN — BUPIVACAINE HYDROCHLORIDE 10 ML: 5 INJECTION, SOLUTION EPIDURAL; INTRACAUDAL; PERINEURAL at 20:55

## 2020-01-25 RX ADMIN — SODIUM CITRATE AND CITRIC ACID MONOHYDRATE 15 ML: 500; 334 SOLUTION ORAL at 21:16

## 2020-01-25 RX ADMIN — MISOPROSTOL 25 MCG: 100 TABLET ORAL at 00:48

## 2020-01-25 RX ADMIN — Medication 10 MG: at 21:17

## 2020-01-25 RX ADMIN — LIDOCAINE HYDROCHLORIDE,EPINEPHRINE BITARTRATE 3.5 ML: 15; .005 INJECTION, SOLUTION EPIDURAL; INFILTRATION; INTRACAUDAL; PERINEURAL at 14:18

## 2020-01-25 RX ADMIN — SODIUM CHLORIDE, SODIUM LACTATE, POTASSIUM CHLORIDE, AND CALCIUM CHLORIDE: 600; 310; 30; 20 INJECTION, SOLUTION INTRAVENOUS at 21:25

## 2020-01-25 RX ADMIN — LABETALOL HCL 200 MG: 200 TABLET, FILM COATED ORAL at 09:38

## 2020-01-25 RX ADMIN — LABETALOL HCL 200 MG: 200 TABLET, FILM COATED ORAL at 18:47

## 2020-01-25 RX ADMIN — BUPIVACAINE HYDROCHLORIDE 5 ML: 2.5 INJECTION, SOLUTION EPIDURAL; INFILTRATION; INTRACAUDAL; PERINEURAL at 14:18

## 2020-01-25 RX ADMIN — SODIUM CHLORIDE, SODIUM LACTATE, POTASSIUM CHLORIDE, AND CALCIUM CHLORIDE 125 ML/HR: 600; 310; 30; 20 INJECTION, SOLUTION INTRAVENOUS at 10:46

## 2020-01-25 RX ADMIN — SODIUM CHLORIDE 5 MILLION UNITS: 900 INJECTION INTRAVENOUS at 10:46

## 2020-01-25 RX ADMIN — SODIUM CHLORIDE 250 ML: 900 INJECTION, SOLUTION INTRAVENOUS at 16:00

## 2020-01-25 RX ADMIN — BUPIVACAINE HYDROCHLORIDE 4 ML: 2.5 INJECTION, SOLUTION EPIDURAL; INFILTRATION; INTRACAUDAL; PERINEURAL at 14:21

## 2020-01-25 NOTE — PROGRESS NOTES
Verbal shift change report given to LARRY San/BOUBACAR Eldridge Rn (oncoming nurse) by MARION Garcia (offgoing nurse). Report included the following information Kardex, Intake/Output, MAR and Recent Results. 6573- Pt up to bathroom   0750- Pt back to bed TOCO/EFM adjusted  0825- Pt off monitor up to shower and to eat breakfast.   0930- Patient back in bed and TOCO/EFM applied. 0940- [place] readjusted. Patient states feels \"cramping\" in abdomen. Due to positioning difficult to trace contraction pattern. 1- Dr. Jacobs President in room. Cook balloon inserted with out issue. Pt states feeling contractions more often then prior. [place]/EFM adjusted   1145- Strip reviewed by Salomon Bates, cat 1, ok to start pitocin. Pitocin started via pump at 1milli-unit/min for induction of labor. Verified by BOUBACAR Eldridge Rn. Pt on left side and states contractions feeling stronger. 1222-up to bathoom  [address] balloon came out on toilet. Pt states increased pressure in back and bottom. Notified Salomon Bates- will come assess patient   1320-Pt requesting epidural at this time. Bolus started. 1340- Anaesthesia paged  1341- Dr. Nida Fisher, anaesthesiologist called back and notified of epidural request.    1403 Dr Nida Fisher in room for epidural  1404- Time out preformed  1408 Epidural procedure started  1412-epidural line in  1413 Epidural test dose  1414 epidural bolus given  1423- Patient laying supine with a wedge under right hip  1453- epidural started. [address] into room with this nurse and BOUBACAR Eldridge RN. AROM clear fluid. FSE and IUPC placed. SVE. Repositioned on right side with peanut ball between legs. 1533- Turned to Left side with peanut ball between legs  1600- amnioinfusion started. 1630-Pt turned to hands and knees. 1800- IUPC partially out. Gordo CNM replaced IUPC. 1840-turned to right lateral   1845-turned left lateral  1855- Updated Gordo CNM of [facility]. Salomon Bates reviewed strip and notified Dr. Jacobs President per CNM. Order received to turn off pitocin and bolus 500ml LR.  1856 CNM gabriel  into room to talk with patient about POC  1900 [facility]

## 2020-01-25 NOTE — H&P
History & Physical    Name: Jorden Cook MRN: [id number]  SSN: [id number]    YOB: 1985  Age: 30 y.o. Sex: female      Subjective:     Estimated Date of Delivery: 20  OB History        1    Para        Term                AB        Living           SAB        TAB        Ectopic        Molar        Multiple        Live Births                    Jorden Cook is admitted with pregnancy at 42w4d for induction of labor secondary to chronic hypertension on medication with rising blood pressures. Prenatal course was complicated by [facility], morbid obesity, hyperthyroidism. Please see prenatal records for details. She began prenatal care with [facility] on 2019 at 10.4 wks GA confirmed by TVUS. Her total weight gain for this pregnancy has been 35 lbs. Allergies   Allergen Reactions    Oxycodone Hives     Family History   Problem Relation Age of Onset    Hypertension Mother     Hypertension Father       reports that she has never smoked. She has never used smokeless tobacco. She reports that she does not drink alcohol or use drugs. Objective:     Vitals:  Vitals:    20 1903 202 20 2042 20   BP: (!) 149/103   147/88   Pulse: (!) 104   93   Temp:    98.3 °F (36.8 °C)   SpO2:   100%    Weight:  136.1 kg (300 lb)     Height:  5' 8\" (1.727 m)          Physical Exam:  Patient without distress of labor; nervous  0 cm dilated    0% effaced    -3 station    Presenting Part: cephalic confirmed by ultrasound  Cervical Position: posterior  Consistency: Medium  Membranes:  Intact  Contractions: none noted  Fetal Heart Rate & Contraction pattern: Baseline: 135 per minute  Variability: moderate  Accelerations: yes  Decelerations: none  EFW: 7 lbs    Prenatal Labs:   O POSITIVE, immune, HIV/HepB/HepC/GC/CT neg, VDRL NR    Group B Strep was positive, will treat prophylactically with penicillin.     Assessment/Plan:     Plan: Admit to 3416/01 for induction of labor. Discussed serial induction with ripening at night, Pitocin during the day. Will start with PV Cytotec tonight. Binh Whalen MD notified.      Signed By:  Benson Mauro CNM     January 24, 2020

## 2020-01-25 NOTE — PROGRESS NOTES
Labor Progress Note  Patient seen, fetal heart rate and contraction pattern evaluated, patient examined. Haley Parent starting to use focused breathing during contractions, considering epidural soon. Patient Vitals for the past 4 hrs:   Temp Pulse BP   20 1147 98.4 °F (36.9 °C) -- --   20 1116 -- 87 129/67   20 1037 -- 75 120/53   20 0938 -- 94 129/72         Physical Exam:  Cervical Exam:  5/80 %/-2/   Membranes:  Intact, bulging bag  Uterine Activity: Frequency: Every 2-3 minutes and Duration: 60 seconds  Fetal Heart Rate: Baseline: 135 per minute  Variability: moderate  Accelerations: yes  Decelerations: none    Assessment: IUP 37w2d; FHR Category I; IOL for CHTN  Plan: Continue to monitor for maternal and fetal wellbeing, consider AROM after epidural or on next CE, encouraged maternal repositioning, anticipate . Dr. Lesly Haines notified.   Brenna Figueroa CNM  2020  12:54 PM

## 2020-01-25 NOTE — ANESTHESIA PROCEDURE NOTES
Epidural Block    Start time: 1/25/2020 2:14 PM  End time: 1/25/2020 2:25 PM  Performed by: Magalie Mason MD  Authorized by:  Magalie Mason MD     Pre-Procedure  Indication: labor epidural    Preanesthetic Checklist: patient identified, risks and benefits discussed, anesthesia consent, site marked, patient being monitored, timeout performed and anesthesia consent    Timeout Time: 14:14        Epidural:   Patient position:  Seated  Location:  L2-3    Needle and Epidural Catheter:   Needle Type:  Tuohy  Needle Gauge:  17 G  Injection Technique:  Loss of resistance using air  Attempts:  1  Catheter Size:  20 G  Catheter at Skin Depth (cm):  14  Depth in Epidural Space (cm):  5  Events: no blood with aspiration, no cerebrospinal fluid with aspiration, no paresthesia and negative aspiration test    Test Dose:  Negative    Assessment:   Catheter Secured:  Tegaderm and tape  Insertion:  Uncomplicated  Patient tolerance:  Patient tolerated the procedure well with no immediate complications

## 2020-01-25 NOTE — PROGRESS NOTES
1835: pt arrived to L&D ambulatory from home for her scheduled induction. Reviewed consents with patient. EFM and toco applied. 1905: Bedside and Verbal shift change report given to [address] [place] [address] (oncoming nurse) by Nora Kirk (offgoing nurse). Report included the following information SBAR, Kardex and Recent Results.

## 2020-01-25 NOTE — PROGRESS NOTES
Labor Progress Note  Patient seen, fetal heart rate and contraction pattern evaluated, patient examined. Called to room to evaluated IUPC, catheter almost completely dislodged. Patient Vitals for the past 1 hrs:   BP Pulse SpO2   20 1806 -- -- 99 %   20 1802 -- -- 100 %   20 1801 109/57 80 --   20 175 -- -- 100 %   20 175 -- -- 100 %   20 174 -- -- 100 %   20 1745 121/56 83 --   20 174 -- -- 100 %   20 173 -- -- 100 %   20 173 -- -- 100 %   20 1730 92/58 95 --   20 172 -- -- 100 %   20 172 -- -- 100 %       Physical Exam:  Cervical Exam:  5-6/90 %/-1/   Membranes:  Artificial Rupture of Membranes; Amniotic Fluid: medium amount of clear fluid  Uterine Activity: Frequency: Every 2-3 minutes and Duration: 60 seconds  180 mvu in 10 mins  Fetal Heart Rate: Baseline: 140 per minute  Variability: moderate  Accelerations: no  Decelerations: early    Assessment: IUP 37w2d; FHR Category I; IOL for CHTN  Plan: Continue to monitor for maternal and fetal wellbeing, pitocin titrated per protocol, anticipate .   Sarah Dye CNM  2020  6:18 PM

## 2020-01-25 NOTE — PROGRESS NOTES
Pt positioned to L lateral from knee chest.   Spoke with Sukhdev Forman CNM, Strip reviewed. Order to restart pitocin at this time.

## 2020-01-25 NOTE — PROGRESS NOTES
Labor progress note      Pt without complaints. Feeling ctxs, but mild. Vitals:  Visit Vitals  /72   Pulse 94   Temp 98.4 °F (36.9 °C)   Resp 18   Ht 5' 8\" (1.727 m)   Wt 136.1 kg (300 lb)   SpO2 100%   Breastfeeding No   BMI 45.61 kg/m²       Temp (24hrs), Av.6 °F (37 °C), Min:98.3 °F (36.8 °C), Max:98.9 °F (37.2 °C)      Labs:  WBC   Date/Time Value Ref Range Status   2020 08:39 PM 11.7 4.6 - 13.2 K/uL Final   2020 06:08 PM 11.8 4.6 - 13.2 K/uL Final   2020 05:07 PM 12.3 4.6 - 13.2 K/uL Final     HGB   Date/Time Value Ref Range Status   2020 08:39 PM 11.1 (L) 12.0 - 16.0 g/dL Final   2020 06:08 PM 11.1 (L) 12.0 - 16.0 g/dL Final   2020 05:07 PM 10.8 (L) 12.0 - 16.0 g/dL Final     PLATELET   Date/Time Value Ref Range Status   2020 08:39  135 - 420 K/uL Final       Physical Exam:  Gen:  A&O, NAD  Abd:  Soft, gravid, nttp  Cervical Exam:  2/50/-2, soft, not as far posterior as previous check, cook balloon inserted without difficulty with 60/60cc NS  Membranes:  Intact    Uterine Activity: q3min  Fetal Heart Rate: 130s, mod variability, +accels, no decels  Cephalic    Assessment:   35yo  at 37.2wks    Plan:   Reassuring fetal status   - IOL for CHTN on Labetalol 200mg bid. BPs normal to mild range. No signs or symptoms of preeclampsia  - Cook balloon placed, low dose pitocin. Will continue to work towards vaginal delivery. Pt agrees.    - GBS - PCN ppx           Ubaldo Mccoy MD

## 2020-01-25 NOTE — ANESTHESIA PREPROCEDURE EVALUATION
Relevant Problems   No relevant active problems       Anesthetic History   No history of anesthetic complications            Review of Systems / Medical History  Patient summary reviewed, nursing notes reviewed and pertinent labs reviewed    Pulmonary  Within defined limits                 Neuro/Psych   Within defined limits           Cardiovascular    Hypertension: well controlled              Exercise tolerance: >4 METS     GI/Hepatic/Renal  Within defined limits              Endo/Other      Hypothyroidism: well controlled  Morbid obesity     Other Findings              Physical Exam    Airway  Mallampati: II  TM Distance: > 6 cm  Neck ROM: normal range of motion   Mouth opening: Normal     Cardiovascular  Regular rate and rhythm,  S1 and S2 normal,  no murmur, click, rub, or gallop  Rhythm: regular  Rate: normal         Dental  No notable dental hx       Pulmonary  Breath sounds clear to auscultation               Abdominal  Abdominal exam normal       Other Findings            Anesthetic Plan    ASA: 2  Anesthesia type: epidural            Anesthetic plan and risks discussed with: Patient

## 2020-01-25 NOTE — PROGRESS NOTES
Labor progress note    Pt reports ctxs, but only mild discomfort. Denies any other complaints including VB, LOF, HA, visual changes, RUQ/epigastric pain, CP, SOB.  +FM. Vitals:  Visit Vitals  BP (!) 145/95 (BP 1 Location: Left arm, BP Patient Position: At rest)   Pulse 81   Temp 98.4 °F (36.9 °C)   Resp 18   Ht 5' 8\" (1.727 m)   Wt 136.1 kg (300 lb)   SpO2 100%   Breastfeeding No   BMI 45.61 kg/m²       Temp (24hrs), Av.6 °F (37 °C), Min:98.3 °F (36.8 °C), Max:98.9 °F (37.2 °C)      Labs:  WBC   Date/Time Value Ref Range Status   2020 08:39 PM 11.7 4.6 - 13.2 K/uL Final   2020 06:08 PM 11.8 4.6 - 13.2 K/uL Final   2020 05:07 PM 12.3 4.6 - 13.2 K/uL Final     HGB   Date/Time Value Ref Range Status   2020 08:39 PM 11.1 (L) 12.0 - 16.0 g/dL Final   2020 06:08 PM 11.1 (L) 12.0 - 16.0 g/dL Final   2020 05:07 PM 10.8 (L) 12.0 - 16.0 g/dL Final     PLATELET   Date/Time Value Ref Range Status   2020 08:39  135 - 420 K/uL Final       Physical Exam:  Gen:  A&O, NAD  CV:  RRR  Pulm:  CTAB  Cervical Exam:  /-2, posterior, soft  Membranes:  intact  Uterine Activity:  q2-3min  Fetal Heart Rate: 130s, mod variability, +accels, no decels    Cephalic  02Y growth US - EFW 64%, 2952g    Assessment/Plan:   33yo  at 37.2wks  - IOL for CHTN on Labetalol 200mg bid with increasing BPs. Denies signs or symptoms of preeclampsia, labs wnl, urine Pr/Cr 0.1.    - Discussed IOL process with pt about how it can take several days. Reviewed options and agreed to allow pt to take shower and have breakfast.  Will then place FB and plan low dose Pitocin. FHTs reassuring, and BPs normal to mild range. Will continue to work towards . Pt agrees.   - GBS pos - PCN ppx  - Graves disease - controlled on no meds  - Sickle cell trait - FOB status unknown  - PCOS - stopped Metformin in early pregnancy (500mg daily)  - 2.9cm suspected L dermoid seen on US at [address] Aubrey Salmeron MD

## 2020-01-25 NOTE — PROGRESS NOTES
Labor Progress Note  Patient seen, fetal heart rate and contraction pattern evaluated, patient examined. S/p epidural, Eunice comfortable and resting. Discussed use of internal monitors, Jelani Del Rio agrees. Patient Vitals for the past 1 hrs:   BP Pulse SpO2   20 1433 114/73 83 99 %   20 1430 128/70 92 --   20 1428 126/73 99 100 %   20 1425 124/66 98 --   20 1424 125/63 (!) 104 --   20 1423 -- (!) 108 100 %   20 1422 (!) 130/106 -- --   20 1420 133/84 95 99 %   20 1418 149/79 100 --       Physical Exam:  Cervical Exam:  5/80 %/-2/   Membranes:  Artificial Rupture of Membranes; Amniotic Fluid: medium amount of clear fluid  Uterine Activity: Frequency: Every 2-3 minutes and Duration: 60 seconds  IUPC placed, >200 mvu in 10 mins  Fetal Heart Rate: Baseline: 140 per minute  Variability: moderate  Accelerations: yes  Decelerations: early  FSE placed    Assessment: IUP 37w2d; FHR Category I; IOL for CHTN  Plan: Continue to monitor for maternal and fetal wellbeing, pitocin titrated per protocol, anticipate . Dr. Karan Brody notified.   Tiffany Brantley CNM  2020  3:17 PM

## 2020-01-25 NOTE — PROGRESS NOTES
Bedside and Verbal shift change report given to Delaney Razo RN (oncoming nurse) by Madison Corona RN (offgoing nurse). Report included the following information SBAR, Kardex and MAR. 2045: first dose of cytotec placed    0030: Staci Kern RN notified Carina Yanna of patient yo every 2-5 minutes but not feeling them. Juanpablo Lowery gave order to place another dose of cytotec.

## 2020-01-26 LAB
HCT VFR BLD AUTO: 30 % (ref 35–45)
HGB BLD-MCNC: 9.7 G/DL (ref 12–16)

## 2020-01-26 PROCEDURE — 77030027138 HC INCENT SPIROMETER -A

## 2020-01-26 PROCEDURE — 75410000003 HC RECOV DEL/VAG/CSECN EA 0.5 HR

## 2020-01-26 PROCEDURE — 36415 COLL VENOUS BLD VENIPUNCTURE: CPT

## 2020-01-26 PROCEDURE — 75410000002 HC LABOR FEE PER 1 HR

## 2020-01-26 PROCEDURE — 85014 HEMATOCRIT: CPT

## 2020-01-26 PROCEDURE — 74011250636 HC RX REV CODE- 250/636: Performed by: OBSTETRICS & GYNECOLOGY

## 2020-01-26 PROCEDURE — 77030036554

## 2020-01-26 PROCEDURE — 65270000029 HC RM PRIVATE

## 2020-01-26 PROCEDURE — 77030010848 HC CATH INTUTR PRSS KOLB -B

## 2020-01-26 PROCEDURE — 74011250637 HC RX REV CODE- 250/637: Performed by: OBSTETRICS & GYNECOLOGY

## 2020-01-26 PROCEDURE — 76060000078 HC EPIDURAL ANESTHESIA

## 2020-01-26 PROCEDURE — 77030040830 HC CATH URETH FOL MDII -A

## 2020-01-26 PROCEDURE — 85018 HEMOGLOBIN: CPT

## 2020-01-26 RX ADMIN — HYDROCODONE BITARTRATE AND ACETAMINOPHEN 2 TABLET: 5; 325 TABLET ORAL at 03:47

## 2020-01-26 RX ADMIN — SODIUM CHLORIDE, SODIUM LACTATE, POTASSIUM CHLORIDE, AND CALCIUM CHLORIDE 125 ML/HR: 600; 310; 30; 20 INJECTION, SOLUTION INTRAVENOUS at 11:53

## 2020-01-26 RX ADMIN — HYDROCODONE BITARTRATE AND ACETAMINOPHEN 2 TABLET: 5; 325 TABLET ORAL at 16:52

## 2020-01-26 RX ADMIN — KETOROLAC TROMETHAMINE 30 MG: 30 INJECTION, SOLUTION INTRAMUSCULAR at 05:57

## 2020-01-26 RX ADMIN — LABETALOL HCL 200 MG: 200 TABLET, FILM COATED ORAL at 18:10

## 2020-01-26 RX ADMIN — LABETALOL HCL 200 MG: 200 TABLET, FILM COATED ORAL at 08:59

## 2020-01-26 RX ADMIN — Medication 10 ML: at 18:16

## 2020-01-26 RX ADMIN — KETOROLAC TROMETHAMINE 30 MG: 30 INJECTION, SOLUTION INTRAMUSCULAR at 18:09

## 2020-01-26 RX ADMIN — DOCUSATE SODIUM 100 MG: 100 CAPSULE, LIQUID FILLED ORAL at 18:10

## 2020-01-26 RX ADMIN — DOCUSATE SODIUM 100 MG: 100 CAPSULE, LIQUID FILLED ORAL at 08:59

## 2020-01-26 RX ADMIN — HYDROCODONE BITARTRATE AND ACETAMINOPHEN 2 TABLET: 5; 325 TABLET ORAL at 21:56

## 2020-01-26 RX ADMIN — KETOROLAC TROMETHAMINE 30 MG: 30 INJECTION, SOLUTION INTRAMUSCULAR at 00:03

## 2020-01-26 RX ADMIN — KETOROLAC TROMETHAMINE 30 MG: 30 INJECTION, SOLUTION INTRAMUSCULAR at 11:58

## 2020-01-26 RX ADMIN — Medication 125 ML/HR: at 02:58

## 2020-01-26 NOTE — ROUTINE PROCESS
Verbal shift change report given to Dewey Gallo, RN (oncoming nurse) by BOUBACAR Tello RN (offgoing nurse). Report included the following information Kardex, Intake/Output, MAR and Recent Results. 0850--assessment done--denies pain--has had no n/v--having regular diet for breakfast--danielle care done--reviewed the POC for today--verbalizes understanding--po fluids encouraged  1210--SCD's off--Akbar cath removed--danielle care done--assisted out of bed--walked about in the room--took a small sponge bath and brushed her teeth--began some leakage of blood from misplaced pad--walked to the bathroom--danielle care demonstrated and done--pad and panties replaced--abdominal binder applied--linens changed--up to sit on the side of the bed for lunch--IV fluids maintained--advised to call for assistance when ready to get up to the bathroom--verbalizes understanding.---SCDs to remain off  1650--assisted up t the bathroom--voided 350cc--danielle care done--family visiting  1845--vital signs stable--voiding--effective pain management with [place] and Toradol--is both breast and bottle feeding--abdominal binder very helpful. 1900--report given to AI Dawson

## 2020-01-26 NOTE — PROGRESS NOTES
Labor progress note      Pt without complaints, comfortable with epidural.        Vitals:  Visit Vitals  /84   Pulse 89   Temp 98.7 °F (37.1 °C)   Resp 20   Ht 5' 8\" (1.727 m)   Wt 136.1 kg (300 lb)   SpO2 99%   Breastfeeding No   BMI 45.61 kg/m²       Temp (24hrs), Av.5 °F (36.9 °C), Min:98.1 °F (36.7 °C), Max:98.9 °F (37.2 °C)      Labs:  WBC   Date/Time Value Ref Range Status   2020 08:39 PM 11.7 4.6 - 13.2 K/uL Final   2020 06:08 PM 11.8 4.6 - 13.2 K/uL Final   2020 05:07 PM 12.3 4.6 - 13.2 K/uL Final     HGB   Date/Time Value Ref Range Status   2020 08:39 PM 11.1 (L) 12.0 - 16.0 g/dL Final   2020 06:08 PM 11.1 (L) 12.0 - 16.0 g/dL Final   2020 05:07 PM 10.8 (L) 12.0 - 16.0 g/dL Final     PLATELET   Date/Time Value Ref Range Status   2020 08:39  135 - 420 K/uL Final     Physical Exam:  Gen:  A&O, NAD  Abd:  Soft, gravid, nttp  Cervical Exam:  Unchanged, 5/80/-2  Membranes:  S/p AROM, leaking clear non odorous fluid  IUPC: q2min, 200mvus  FSE: 140s, mod variability, late and variable decels     Assessment:   33yo  at 37.2wks    Plan:   - IOL for CHTN on Labetalol 200 bid. Denies signs or symptoms of preeclampsia  - Pt with no cervical change despite adequate contractions. Fetal intolerance of labor as late and variable decels with pitocin only up to 4mu/hr. Discussed CS with pt with risks of infection, bleeding, possible need for blood transfusion (HIV 1/1,000,000, Hep C 1/250,000, antibody formation, reaction), injury to other organs/nerves/vessels/baby, anesthesia complications, and need for other indicated procedures. Pt agrees with plan for CS. Team notified. Pitocin turned off. Awaiting SA and anesthesia.             Marce He MD

## 2020-01-26 NOTE — PROGRESS NOTES
CS Rounding Note    Patient: Jorden Cook MRN: [id number]  SSN: [id number]    YOB: 1985  Age: 30 y.o. Sex: female        Patient without complaints. No ambulation overnight, tolerating regular diet, minimal lochia, pain well controlled with po pain meds. [place] remains in place. +flatus. She is breastfeeding without difficulty. Denies HA, visual changes, RUQ/epigstric pain, CP, SOB, N/V/D. Patient Vitals for the past 12 hrs:   Temp Pulse Resp BP SpO2   20 0859 -- 78 -- 115/58 --   20 0347 97.6 °F (36.4 °C) 82 20 127/70 --   20 0115 99.3 °F (37.4 °C) 81 20 130/69 99 %   20 0031 -- 85 20 134/60 99 %   20 0016 -- 83 20 131/57 99 %   20 0001 -- -- -- 128/75 --   20 2348 -- 100 15 136/67 99 %   20 2346 -- 89 22 130/78 98 %   20 2331 -- 91 24 132/77 98 %   20 2316 -- (!) 104 15 128/69 --   20 2301 -- 89 13 136/90 99 %   20 2257 99.4 °F (37.4 °C) 93 18 (!) 113/96 99 %   20 -- 91 16 (!) 141/107 100 %   20 -- 95 23 -- 99 %   20 -- 94 18 -- 100 %   20 -- 93 18 (!) 113/96 99 %   20 -- 99 15 -- 100 %   20 -- 96 17 (!) 113/96 99 %   20 -- (!) 102 18 -- 99 %     Hgb 11.1 -> 9.7    EXAM:  General:  Alert and oriented, no apparent distress  CV:  RRR  Pulm:  CTAB  Abdomen:  Soft, non distended, appropriately tender to palpation, firm fundus below umbilicus, incision clean/dry/intact, obese, +BS x4 quadrants  Lower extremities bilaterally:  Non tender to palpation, no clubbing/cyanosis/edema/redness, SCDs on    UOP >100cc/hr, clear yellow urine    IMPRESSION: 30 y.o.  POD#1 s/p 1LTCS at 37.2wks for FTP/fetal intolerance of labor after IOL for CHTN    PLAN:   - CHTN - BPs wnl on Labetalol 200mg po bid. Denies signs or symptoms of preeclampsia  - DC [place] and IVF.   Follow up voiding  - Ambulate with assistance  - Monitor today  - Routine postop care    Jose Pardo MD  January 26, 2020  10:23 AM

## 2020-01-26 NOTE — ROUTINE PROCESS
TRANSFER - IN REPORT:     Verbal - Bedside Report received on Dejuan Heller from [facility] Delivery RN for transfer and routine progression of care on Mother/Baby Unit in room 3401 after primary  section. Report consisted of patients Situation, Background, Assessment and   Recommendations(SBAR). Information from the following report(s) SBAR, Procedure Summary, Intake/Output, MAR, Recent Results and Med Rec Status were reviewed. Opportunity for questions and clarification was provided. Care assumed.

## 2020-01-26 NOTE — OP NOTES
Section Operative Note      Name: [id number] [facility] Record Number: [id number]   Today's Date: 2020      PREOP DIAGNOSIS:   1.  30 y. o.yo  at 37.2wks  2. CHTN on Labetalol 200mg po bid  3. Fetal intolerance of labor  4. Failure to progress  POSTOP DIAGNOSIS: Same    PROCEDURE:  Primary low transverse  section    April Broderick MD    ASSISTANT: Shan Baeza SA  ANESTHESIA: Epidural    EBL: 700mL  IVF:  700mL  UOP:  10mL concentrated yellow urine    ANTIBIOTICS: Ancef 3g  COMPLICATIONS: none  IMPLANTS:  none  CONDITION: stable to recovery    FETAL DESCRIPTION: metz male    BIRTH INFORMATION:   Information for the patient's newbornHiginio Skinner [[id number]]          OPERATIVE FINDINGS:      At the time of the surgery a live Male delivered  with an APGAR of  8 and 9 at 1 and 5 minutes respectively. Birth weight is pending  Amniotic fluid was Clear. Placenta intact with a 3 vessel cord. Inspection of the uterus, fallopian tubes revealed normal anatomy. Bilateral enlarged ovaries suspect polycystic without identifiable mass, cyst, or abnormal growths, normal appearance, right ovary approximately 3cm, left ovary approximately 4cm. The baby was OP and asynclitic in the pelvis with face presenting at hysterotomy. Cord gas pH 7.2 / 7.2      PROCEDURE:    Informed consent was obtained and risks discussed including risk of damage to bowel, bladder, nerves and blood vessels. Consent was obtained for blood transfusion in the case of emergency. The patient was taken to the operating room, where spinal anesthesia was found to be adequate. The patient was prepped and draped in the normal sterile fashion in the supine position. A Pfannenstiel skin incision was made with the scalpel and carried down to the underlying layer of fascia which was incised in the midline. The fascial incision was extended laterally with the curved Navarro scissors.  The superior aspect of the fascial incision was grasped with Kocher clamps, elevated, and the underlying rectus muscles were dissected off bluntly and with Navarro scissors. The inferior aspect of the fascia was grasped with the Kocher clamps, elevated, and the underlying rectus muscles were dissected off in a similar fashion. The rectus muscles were  in the midline, and the peritoneum was entered bluntly, then spread by pulling superiorly and laterally. The bladder blade was inserted. The vesicouterine peritoneum was tented, entered sharply with the Metzebaum scissors, then extended laterally. The bladder flap was created digitally, and the bladder blade was reinserted. A low transverse uterine incision was made with the scalpel and extended by pulling cephalad and caudad. There was clear amniotic fluid. The babys head was delivered atraumatically followed by the body without difficulty. The crying infant's nose and mouth were suctioned with bulb suction. The cord was clamped and cut and the crying infant was handed off to the waiting pediatricians. The placenta was expressed with uterine massage and gentle traction. The uterus was exteriorized and cleared of all clots and debris. The uterine incision was closed in two layers. The first layer with running locked layer of 0 Vicryl. The second layer was an imbricating layer of 0 Monocryl with excellent hemostasis. The posterior cul-de-sac was cleared of all clots and debris. The uterus was returned to the pelvis. The paracolic gutters were cleared of all clots and debris. The uterine incision was reinspected and found to be hemostatic. The fascia was closed with 0 Vicryl in a running fashion. The subcuticular layers were irrigated, then suctioned. The bovie was used for hemostasis. The subcutaneous tissue was then reapproximated with 2-0 plain gut in interrupted fashion. The skin was closed with a 4-0 monocryl in a running, subcuticular fashion.  Dermabond was placed on the incision. The patient tolerated the procedure well. Sponge, lap, and needle counts were correct times two and the patient was taken to recovery in stable condition.           Signed By:  Kavita Washington MD     January 25, 2020

## 2020-01-26 NOTE — PROGRESS NOTES
Labor Progress Note  Patient seen, fetal heart rate and contraction pattern evaluated, patient examined. Discussed plan to restart pitocin after recovery time, and expect cervical change and fetal descent to proceed with induction. Jamar Nieves expressed concern and agrees with  if needed. Patient Vitals for the past 4 hrs:   Temp Pulse Resp BP SpO2   20 1806 -- -- -- -- 99 %   20 1802 -- -- -- -- 100 %   20 1801 -- 80 -- 109/57 --   20 1757 -- -- -- -- 100 %   20 1752 -- -- -- -- 100 %   20 1747 -- -- -- -- 100 %   20 1745 -- 83 -- 121/56 --       Physical Exam:  Cervical Exam:  5/80 %/-2/   Membranes:  Artificial Rupture of Membranes; Amniotic Fluid: medium amount of clear fluid  Uterine Activity: Frequency: Every 2-3 minutes and Duration: 60 seconds  Fetal Heart Rate: Baseline: 140 per minute  Variability: moderate  Accelerations: no  Decelerations: early, variable and late resolved with position change, fluid bolus, and pit off. Assessment: IUP 37w2d; FHR Category II; fetal position likely OP; IOL for CHTN. Plan: Continue to monitor for maternal and fetal wellbeing, pitocin titrated per protocol. Dr. Aidee Luna consulted and agrees with plan to restart pit in 30 minutes,  if no additional cvx change to follow.   Sherryle Bachelor, CNM  2020  7:08 PM

## 2020-01-26 NOTE — L&D DELIVERY NOTE
Delivery Summary    Patient: Kb Sotomayor MRN: [id number]  SSN: [id number]    YOB: 1985  Age: 30 y.o. Sex: female        Information for the patient's :  Domingo Thomas [[id number]]       Labor Events:    Labor: No    Steroids:     Cervical Ripening Date/Time: 2020 8:43 PM   Cervical Ripening Type: Misoprostol   Antibiotics During Labor:     Rupture Identifier:      Rupture Date/Time: 2020 3:23 PM   Rupture Type: AROM;Bulging   Amniotic Fluid Volume:      Amniotic Fluid Description: Clear    Amniotic Fluid Odor: None    Induction: Akbar Bulb (balloon)       Induction Date/Time:        Indications for Induction:      Augmentation:     Augmentation Date/Time:      Indications for Augmentation:     Labor complications: Additional complications:        Delivery Events:  Indications For Episiotomy:     Episiotomy:     Perineal Laceration(s):     Repaired:     Periurethral Laceration Location:      Repaired:     Labial Laceration Location:     Repaired:     Sulcal Laceration Location:     Repaired:     Vaginal Laceration Location:     Repaired:     Cervical Laceration Location:     Repaired:     Repair Suture:     Number of Repair Packets:     Estimated Blood Loss (ml):  ml     Delivery Date: 2020    Delivery Time: 9:49 PM   Delivery Type: , Low Transverse     Details    Trial of Labor: Yes   Primary/Repeat: Primary   Priority: Emergency   Indications:  Arrest of Descent; Fetal Intolerance of Labor       Sex:  Male     Gestational Age: 42w2d  Delivery Clinician:  Vane Balderrama  Living Status: Living   Delivery Location: OR OR1           APGARS  One minute Five minutes Ten minutes   Skin color: 0   1        Heart rate: 2   2        Grimace: 2   2        Muscle tone: 2   2        Breathin   2        Totals: 8   9          Presentation: Vertex    Position:   Occiput Posterior  Resuscitation Method:  Tactile Stimulation;Suctioning-bulb Meconium Stained: None      Cord Information: 3 Vessels  Complications: None  Cord around:    Delayed cord clamping? No  Cord clamped date/time:   Disposition of Cord Blood: Lab    Blood Gases Sent?: Yes    Placenta:  Date/Time: 2020  9:50 PM  Removal: Expressed      Appearance: Normal      Measurements:  Birth Weight: 2.91 kg      Birth Length: 50.8 cm      Head Circumference: 35 cm      Chest Circumference: 31 cm     Abdominal Girth: 30 cm    Other Providers:   LEILANI GAYTAN;SJ SAPP;PERNELL BELLO;;BAILEY GREEN;;YG VILLAREAL;;MISTY GOULD, Obstetrician;Primary Nurse;Primary [place] Nurse;Nicu Nurse;Neonatologist;Anesthesiologist;Crna;Nurse Practitioner;Midwife;Nursery Nurse             Group B Strep:   Lab Results   Component Value Date/Time    GrBStrep, External POSITIVE 2020     Information for the patient's :  FUAD Haro [[id number]]   No results found for: ABORH, PCTABR, PCTDIG, BILI, ABORHEXT, ABORH    No results for input(s): PCO2CB, PO2CB, HCO3I, SO2I, IBD, PTEMPI, SPECTI, PHICB, ISITE, IDEV, IALLEN in the last 72 hours.

## 2020-01-26 NOTE — PROGRESS NOTES
Bedside verbal report received from BOUBACAR Eldridge RN and LARRY San RN and all patient care assumed by this RN at this time. - Pitocin restarted at 3mU/min per MARION Woods. - MARION Gibbons Case at bedside for cervical exam. No cervical change noted; decision for  made and discussed with pt and  who agreed with POC.     - MD Balderrama at bedside. POC discussed with pt and  and time left for questions. 6- Pt transferred via stretcher accompanied by infant, in [facility], and  to postpartum unit room 3401. Pt in stable condition with A. Ezzie Dakins, RN at bedside. 80- Verbal report to A. Ezzie Dakins, RN and all patient care turned over at this time.

## 2020-01-26 NOTE — PROGRESS NOTES
Labor Progress Note  Patient seen, fetal heart rate and contraction pattern evaluated, patient examined. Discussed FHT and lack of cvx change, agrees to proceed with . Patient Vitals for the past 1 hrs:   BP Pulse SpO2   20 -- -- 99 %   20 -- -- 99 %   20 -- 89 100 %   20 149/84 -- --   20 -- -- 99 %   20 -- -- 100 %   20 -- 95 100 %   20 147/80 -- --   20 -- -- 100 %   20 -- -- 100 %   20 -- 86 100 %   20 147/77 -- --       Physical Exam:  Cervical Exam:  5/80 %/-2/   Membranes:  Artificial Rupture of Membranes; Amniotic Fluid: medium amount of clear fluid  Uterine Activity: Frequency: Every 2-3 minutes and Duration: 60 seconds  Fetal Heart Rate: Baseline: 145 per minute  IUPC >200 MVU per 10 mins  Variability: moderate  Accelerations: no  Decelerations: late and early    Assessment: IUP 37w2d; FHR Category I; IOL for CHTN, no cervical change despite adequate contractions. Plan: Continue to monitor for maternal and fetal wellbeing, consulted with Dr. Nilda Little and in agreement to proceed with  section. Anesthesia aware, SA called by WILLIAM Pulliam CNM  2020  8:44 PM

## 2020-01-26 NOTE — PROGRESS NOTES
Problem: Patient Education: Go to Patient Education Activity  Goal: Patient/Family Education  Outcome: Progressing Towards Goal     Problem: Vaginal Delivery: Day of Deliver-Laboring  Goal: Off Pathway (Use only if patient is Off Pathway)  Outcome: Progressing Towards Goal  Goal: Activity/Safety  Outcome: Progressing Towards Goal  Goal: Consults, if ordered  Outcome: Progressing Towards Goal  Goal: Diagnostic Test/Procedures  Outcome: Progressing Towards Goal  Goal: Nutrition/Diet  Outcome: Progressing Towards Goal  Goal: Discharge Planning  Outcome: Progressing Towards Goal  Goal: Medications  Outcome: Progressing Towards Goal  Goal: Respiratory  Outcome: Progressing Towards Goal  Goal: Treatments/Interventions/Procedures  Outcome: Progressing Towards Goal  Goal: *Vital signs within defined limits  Outcome: Progressing Towards Goal  Goal: *Labs within defined limits  Outcome: Progressing Towards Goal  Goal: *Hemodynamically stable  Outcome: Progressing Towards Goal  Goal: *Optimal pain control at patient's stated goal  Outcome: Progressing Towards Goal     Problem: Falls - Risk of  Goal: *Absence of Falls  Description  Document Tristen Cosme Fall Risk and appropriate interventions in the flowsheet. Outcome: Progressing Towards Goal  Note: Fall Risk Interventions:  Mobility Interventions: Patient to call before getting OOB         Medication Interventions: Patient to call before getting OOB    Elimination Interventions: Call light in reach              Problem: Patient Education: Go to Patient Education Activity  Goal: Patient/Family Education  Outcome: Progressing Towards Goal     Problem: Pressure Injury - Risk of  Goal: *Prevention of pressure injury  Description  Document Keyur Scale and appropriate interventions in the flowsheet. Outcome: Progressing Towards Goal  Note: Pressure Injury Interventions:             Activity Interventions: Pressure redistribution bed/mattress(bed type)    Mobility Interventions: HOB 30 degrees or less                          Problem: Patient Education: Go to Patient Education Activity  Goal: Patient/Family Education  Outcome: Progressing Towards Goal     Problem:  Delivery: Day of Delivery  Goal: Activity/Safety  Outcome: Progressing Towards Goal  Goal: Consults, if ordered  Outcome: Progressing Towards Goal  Goal: Diagnostic Test/Procedures  Outcome: Progressing Towards Goal  Goal: Nutrition/Diet  Outcome: Progressing Towards Goal  Goal: Discharge Planning  Outcome: Progressing Towards Goal  Goal: Medications  Outcome: Progressing Towards Goal  Goal: Respiratory  Outcome: Progressing Towards Goal  Goal: Treatments/Interventions/Procedures  Outcome: Progressing Towards Goal  Goal: Psychosocial  Outcome: Progressing Towards Goal  Goal: *Vital signs within defined limits  Outcome: Progressing Towards Goal  Goal: *Labs within defined limits  Outcome: Progressing Towards Goal  Goal: *Hemodynamically stable  Outcome: Progressing Towards Goal  Goal: *Optimal pain control at patient's stated goal  Outcome: Progressing Towards Goal  Goal: *Participates in infant care  Outcome: Progressing Towards Goal  Goal: *Demonstrates progressive activity  Outcome: Progressing Towards Goal  Goal: *Tolerating diet  Outcome: Progressing Towards Goal     Problem:  Delivery: Postpartum Day 1  Goal: Activity/Safety  Outcome: Progressing Towards Goal  Goal: Consults, if ordered  Outcome: Progressing Towards Goal  Goal: Diagnostic Test/Procedures  Outcome: Progressing Towards Goal  Goal: Nutrition/Diet  Outcome: Progressing Towards Goal  Goal: Discharge Planning  Outcome: Progressing Towards Goal  Goal: Medications  Outcome: Progressing Towards Goal  Goal: Respiratory  Outcome: Progressing Towards Goal  Goal: Treatments/Interventions/Procedures  Outcome: Progressing Towards Goal  Goal: Psychosocial  Outcome: Progressing Towards Goal  Goal: *Vital signs within defined limits  Outcome: Progressing Towards Goal  Goal: *Labs within defined limits  Outcome: Progressing Towards Goal  Goal: *Hemodynamically stable  Outcome: Progressing Towards Goal  Goal: *Optimal pain control at patient's stated goal  Outcome: Progressing Towards Goal  Goal: *Participates in infant care  Outcome: Progressing Towards Goal  Goal: *Demonstrates progressive activity  Outcome: Progressing Towards Goal  Goal: *Tolerating diet  Outcome: Progressing Towards Goal  Goal: *Performs self perineal care  Outcome: Progressing Towards Goal     Problem: Lactation Care Plan  Goal: *Infant latching appropriately  Outcome: Progressing Towards Goal  Goal: *Weight loss less than 10% of birth weight  Outcome: Progressing Towards Goal     Problem: Patient Education: Go to Patient Education Activity  Goal: Patient/Family Education  Outcome: Progressing Towards Goal

## 2020-01-26 NOTE — ANESTHESIA POSTPROCEDURE EVALUATION
Post-Anesthesia Evaluation & Assessment    Visit Vitals  /58   Pulse 78   Temp 36.4 °C (97.6 °F)   Resp 20   Ht 5' 8\" (1.727 m)   Wt 136.1 kg (300 lb)   SpO2 99%   Breastfeeding Unknown   BMI 45.61 kg/m²       Nausea/Vomiting: no nausea    Pain score (VAS): 0    Post-operative hydration adequate. Mental status & Level of consciousness: orientation per pre-anesthetic level    Neurological status: moves all extremities, sensation grossly intact    Pulmonary status: airway patent, no supplemental oxygen required    Complications related to anesthesia: none    Additional comments:  PAtient sitting up in bed, family member holding and bottle feeding baby. Patient states she has been able to walk and shower without difficulty. Wearing an abdominal binder, epidural insertion site inspected and dry, no bleeding or bruising noted. All questions answered. Lazaro Muñiz CRNA  2020  Procedure(s):   SECTION. epidural    <BSHSIANPOST>    Vitals Value Taken Time   /60 2020 12:31 AM   Temp 37.4 °C (99.4 °F) 2020 10:57 PM   Pulse 78 2020 12:35 AM   Resp 23 2020 12:35 AM   SpO2 100 % 2020 12:35 AM   Vitals shown include unvalidated device data.

## 2020-01-27 PROCEDURE — 74011250637 HC RX REV CODE- 250/637: Performed by: OBSTETRICS & GYNECOLOGY

## 2020-01-27 PROCEDURE — 65270000029 HC RM PRIVATE

## 2020-01-27 RX ORDER — IBUPROFEN 400 MG/1
800 TABLET ORAL
Status: DISCONTINUED | OUTPATIENT
Start: 2020-01-27 | End: 2020-01-28 | Stop reason: HOSPADM

## 2020-01-27 RX ORDER — NYSTATIN 100000 [USP'U]/G
POWDER TOPICAL 2 TIMES DAILY
Status: DISCONTINUED | OUTPATIENT
Start: 2020-01-27 | End: 2020-01-28 | Stop reason: HOSPADM

## 2020-01-27 RX ADMIN — IBUPROFEN 800 MG: 400 TABLET, FILM COATED ORAL at 15:55

## 2020-01-27 RX ADMIN — LABETALOL HCL 200 MG: 200 TABLET, FILM COATED ORAL at 18:00

## 2020-01-27 RX ADMIN — NYSTATIN: 100000 POWDER TOPICAL at 18:00

## 2020-01-27 RX ADMIN — IBUPROFEN 800 MG: 400 TABLET, FILM COATED ORAL at 03:20

## 2020-01-27 RX ADMIN — HYDROCODONE BITARTRATE AND ACETAMINOPHEN 2 TABLET: 5; 325 TABLET ORAL at 15:55

## 2020-01-27 RX ADMIN — HYDROCODONE BITARTRATE AND ACETAMINOPHEN 2 TABLET: 5; 325 TABLET ORAL at 03:20

## 2020-01-27 RX ADMIN — HYDROCODONE BITARTRATE AND ACETAMINOPHEN 2 TABLET: 5; 325 TABLET ORAL at 09:14

## 2020-01-27 RX ADMIN — DOCUSATE SODIUM 100 MG: 100 CAPSULE, LIQUID FILLED ORAL at 09:15

## 2020-01-27 RX ADMIN — SIMETHICONE CHEW TAB 80 MG 80 MG: 80 TABLET ORAL at 03:30

## 2020-01-27 RX ADMIN — DOCUSATE SODIUM 100 MG: 100 CAPSULE, LIQUID FILLED ORAL at 18:00

## 2020-01-27 RX ADMIN — LABETALOL HCL 200 MG: 200 TABLET, FILM COATED ORAL at 09:15

## 2020-01-27 NOTE — PROGRESS NOTES
Patient doing well. Ambulating without complaints. Voiding without problems. Pain managed well with motrin and norco. Bonding well with baby.

## 2020-01-27 NOTE — PROGRESS NOTES
Post-Operative  Progress Note    Patient doing well post-op day 2 from  delivery without significant complaints. Pain controlled on current medication. Voiding without difficulty, normal lochia. Passing flatus. Breastfeeding and bottlefeeding. Tolerating diet. Vitals:   Patient Vitals for the past 8 hrs:   Temp Pulse Resp BP SpO2   20 0300 98.5 °F (36.9 °C) 95 16 134/70 100 %         Exam:  Patient without distress. Abdomen soft, fundus firm at level of umbilicus, non tender. Incision dry and clean without erythema. Lower extremities are negative for swelling, cords or tenderness. Lab/Data Review:  Lab Results   Component Value Date/Time    WBC 11.7 2020 08:39 PM    HGB 9.7 (L) 2020 04:40 AM    HCT 30.0 (L) 2020 04:40 AM    PLATELET 357 03/10/8967 08:39 PM    MCV 70.1 (L) 2020 08:39 PM       Assessment:  POD # 2 s/p  section for arrest of dilation     Plan:   Routine postop care. Advance diet. Encourage ambulation. Encourage breastfeeding.   [address], CNM  2020

## 2020-01-27 NOTE — LACTATION NOTE
Mother is doing a combination of breast and formula. Discussed colostrum, it's importance and encouraged mom to nurse first, then supplement if she feels it is needed. Discussed positioning, diapers, milk coming in, pumping and nipple care. Lots of discussion, encouragement given. Offered assistance if needed.

## 2020-01-27 NOTE — ROUTINE PROCESS
Verbal shift change report given to Alicia Skinner RN (oncoming nurse) by Travis Rothman RN (offgoing nurse). Report included the following information SBAR, Procedure Summary, Intake/Output, MAR and Recent Results.

## 2020-01-27 NOTE — PROGRESS NOTES
0700 Received report from Siri W [place]  to oncoming nurse SABINO Vargas rn via sbar at bedside

## 2020-01-28 VITALS
TEMPERATURE: 98.6 F | HEART RATE: 83 BPM | SYSTOLIC BLOOD PRESSURE: 141 MMHG | HEIGHT: 68 IN | OXYGEN SATURATION: 100 % | RESPIRATION RATE: 16 BRPM | BODY MASS INDEX: 44.41 KG/M2 | WEIGHT: 293 LBS | DIASTOLIC BLOOD PRESSURE: 86 MMHG

## 2020-01-28 PROCEDURE — 74011250637 HC RX REV CODE- 250/637: Performed by: OBSTETRICS & GYNECOLOGY

## 2020-01-28 RX ORDER — HYDROCODONE BITARTRATE AND ACETAMINOPHEN 5; 325 MG/1; MG/1
1 TABLET ORAL
Qty: 15 TAB | Refills: 0 | Status: SHIPPED | OUTPATIENT
Start: 2020-01-28 | End: 2020-02-11

## 2020-01-28 RX ORDER — LABETALOL 100 MG/1
TABLET, FILM COATED ORAL
Qty: 60 TAB | Refills: 1 | Status: SHIPPED | OUTPATIENT
Start: 2020-01-28 | End: 2020-07-08 | Stop reason: ALTCHOICE

## 2020-01-28 RX ORDER — DOCUSATE SODIUM 100 MG/1
100 CAPSULE, LIQUID FILLED ORAL
Qty: 60 CAP | Refills: 1 | Status: SHIPPED | OUTPATIENT
Start: 2020-01-28 | End: 2020-04-27

## 2020-01-28 RX ORDER — IBUPROFEN 800 MG/1
800 TABLET ORAL
Qty: 30 TAB | Refills: 0 | Status: SHIPPED | OUTPATIENT
Start: 2020-01-28

## 2020-01-28 RX ADMIN — IBUPROFEN 800 MG: 400 TABLET, FILM COATED ORAL at 01:05

## 2020-01-28 RX ADMIN — LABETALOL HCL 200 MG: 200 TABLET, FILM COATED ORAL at 09:08

## 2020-01-28 RX ADMIN — DOCUSATE SODIUM 100 MG: 100 CAPSULE, LIQUID FILLED ORAL at 09:09

## 2020-01-28 RX ADMIN — HYDROCODONE BITARTRATE AND ACETAMINOPHEN 1 TABLET: 5; 325 TABLET ORAL at 01:05

## 2020-01-28 RX ADMIN — HYDROCODONE BITARTRATE AND ACETAMINOPHEN 1 TABLET: 5; 325 TABLET ORAL at 10:11

## 2020-01-28 NOTE — PROGRESS NOTES
Problem: Patient Education: Go to Patient Education Activity  Goal: Patient/Family Education  1/28/2020 1127 by Grady  A  Outcome: Resolved/Met  1/28/2020 1124 by Grady  A  Outcome: Progressing Towards Goal     Problem: Vaginal Delivery: Day of Deliver-Laboring  Goal: Off Pathway (Use only if patient is Off Pathway)  1/28/2020 1127 by Grady  A  Outcome: Resolved/Met  1/28/2020 1124 by Grady  A  Outcome: Progressing Towards Goal  Goal: Activity/Safety  1/28/2020 1127 by Rgady  A  Outcome: Resolved/Met  1/28/2020 1124 by Grady  A  Outcome: Progressing Towards Goal  Goal: Consults, if ordered  1/28/2020 1127 by Grady  A  Outcome: Resolved/Met  1/28/2020 1124 by Grady  A  Outcome: Progressing Towards Goal  Goal: Diagnostic Test/Procedures  1/28/2020 1127 by Grady  A  Outcome: Resolved/Met  1/28/2020 1124 by Grady  A  Outcome: Progressing Towards Goal  Goal: Nutrition/Diet  1/28/2020 1127 by Grady  A  Outcome: Resolved/Met  1/28/2020 1124 by Grady  A  Outcome: Progressing Towards Goal  Goal: Discharge Planning  1/28/2020 1127 by Grady  A  Outcome: Resolved/Met  1/28/2020 1124 by Grady  A  Outcome: Progressing Towards Goal  Goal: Medications  1/28/2020 1127 by Grady  A  Outcome: Resolved/Met  1/28/2020 1124 by Grady  A  Outcome: Progressing Towards Goal  Goal: Respiratory  1/28/2020 1127 by Grady  A  Outcome: Resolved/Met  1/28/2020 1124 by Grady  A  Outcome: Progressing Towards Goal  Goal: Treatments/Interventions/Procedures  1/28/2020 1127 by Grady  A  Outcome: Resolved/Met  1/28/2020 1124 by Grady  A  Outcome: Progressing Towards Goal  Goal: *Vital signs within defined limits  1/28/2020 1127 by Grady  A  Outcome: Resolved/Met  1/28/2020 1124 by Miguel A Ang A  Outcome: Progressing Towards Goal  Goal: *Labs within defined limits  1/28/2020 1127 by Miguel A Ang A  Outcome: Resolved/Met  1/28/2020 1124 by Miguel A Ang A  Outcome: Progressing Towards Goal  Goal: *Hemodynamically stable  1/28/2020 1127 by Miguel A Ang A  Outcome: Resolved/Met  1/28/2020 1124 by Miguel A  A  Outcome: Progressing Towards Goal  Goal: *Optimal pain control at patient's stated goal  1/28/2020 1127 by Miguel A  A  Outcome: Resolved/Met  1/28/2020 1124 by Miguel A  A  Outcome: Progressing Towards Goal     Problem: Falls - Risk of  Goal: *Absence of Falls  Description  Document Tia Manus Fall Risk and appropriate interventions in the flowsheet. 1/28/2020 1127 by Miguel A Ang A  Outcome: Resolved/Met  Note: Fall Risk Interventions:  Mobility Interventions: Patient to call before getting OOB         Medication Interventions: Teach patient to arise slowly    Elimination Interventions: Call light in reach           1/28/2020 1124 by Miguel A Ang A  Outcome: Progressing Towards Goal  Note: Fall Risk Interventions:  Mobility Interventions: Patient to call before getting OOB         Medication Interventions: Teach patient to arise slowly    Elimination Interventions: Call light in reach              Problem: Patient Education: Go to Patient Education Activity  Goal: Patient/Family Education  1/28/2020 1127 by Miguel A Ang A  Outcome: Resolved/Met  1/28/2020 1124 by Miguel A Ang A  Outcome: Progressing Towards Goal     Problem: Pressure Injury - Risk of  Goal: *Prevention of pressure injury  Description  Document Keyur Scale and appropriate interventions in the flowsheet. 1/28/2020 1127 by Miguel A Ang A  Outcome: Resolved/Met  Note: Pressure Injury Interventions:             Activity Interventions: Pressure redistribution bed/mattress(bed type)    Mobility Interventions: HOB 30 degrees or less                       2020 1124 by Nena Lather A  Outcome: Progressing Towards Goal  Note: Pressure Injury Interventions:             Activity Interventions: Pressure redistribution bed/mattress(bed type)    Mobility Interventions: HOB 30 degrees or less                          Problem: Patient Education: Go to Patient Education Activity  Goal: Patient/Family Education  2020 1127 by Nena Lather A  Outcome: Resolved/Met  2020 1124 by Nena Lather A  Outcome: Progressing Towards Goal     Problem:  Delivery: Day of Delivery  Goal: Activity/Safety  2020 1127 by Nena Lather A  Outcome: Resolved/Met  2020 1124 by Nena Lather A  Outcome: Progressing Towards Goal  Goal: Consults, if ordered  2020 1127 by Nena Lather A  Outcome: Resolved/Met  2020 1124 by Nena Lather A  Outcome: Progressing Towards Goal  Goal: Diagnostic Test/Procedures  2020 1127 by Nena Lather A  Outcome: Resolved/Met  2020 1124 by Nena Lather A  Outcome: Progressing Towards Goal  Goal: Nutrition/Diet  2020 1127 by Nena Lather A  Outcome: Resolved/Met  2020 1124 by Nena Lather A  Outcome: Progressing Towards Goal  Goal: Discharge Planning  2020 1127 by Nena Lather A  Outcome: Resolved/Met  2020 1124 by Nena Lather A  Outcome: Progressing Towards Goal  Goal: Medications  2020 1127 by Nena Lather A  Outcome: Resolved/Met  2020 1124 by Nena Lather A  Outcome: Progressing Towards Goal  Goal: Respiratory  2020 1127 by Nena Lather A  Outcome: Resolved/Met  2020 1124 by Nena Lather A  Outcome: Progressing Towards Goal  Goal: Treatments/Interventions/Procedures  2020 1127 by Nena Lather A  Outcome: Resolved/Met  2020 1124 by Sudeep KATHLEEN  Outcome: Progressing Towards Goal  Goal: Psychosocial  2020 1127 by Sudeep KATHLEEN  Outcome: Resolved/Met  2020 1124 by Sudeep Booth A  Outcome: Progressing Towards Goal  Goal: *Vital signs within defined limits  2020 1127 by Sudeep Booth A  Outcome: Resolved/Met  2020 1124 by Sudeep Booth A  Outcome: Progressing Towards Goal  Goal: *Labs within defined limits  2020 1127 by Sudeep Booth A  Outcome: Resolved/Met  2020 1124 by Sudeep Booth A  Outcome: Progressing Towards Goal  Goal: *Hemodynamically stable  2020 1127 by Sudeep KATHLEEN  Outcome: Resolved/Met  2020 1124 by Sudeep KATHLEEN  Outcome: Progressing Towards Goal  Goal: *Optimal pain control at patient's stated goal  2020 112 by Sudeep KATHLEEN  Outcome: Resolved/Met  2020 1124 by Sudeep KATHLEEN  Outcome: Progressing Towards Goal  Goal: *Participates in infant care  2020 1127 by Sudeep KATHLEEN  Outcome: Resolved/Met  2020 1124 by Sudeep KATHLEEN  Outcome: Progressing Towards Goal  Goal: *Demonstrates progressive activity  2020 112 by Sudeep KATHLEEN  Outcome: Resolved/Met  2020 1124 by Sudeep KATHLEEN  Outcome: Progressing Towards Goal  Goal: *Tolerating diet  2020 1127 by Sudeep KATHLEEN  Outcome: Resolved/Met  2020 1124 by Sudeep KATHLEEN  Outcome: Progressing Towards Goal     Problem:  Delivery: Postpartum Day 1  Goal: Activity/Safety  2020 1127 by Sudeep KATHLEEN  Outcome: Resolved/Met  2020 1124 by Sudeep KATHLEEN  Outcome: Progressing Towards Goal  Goal: Consults, if ordered  2020 112 by Sudeep KATHLEEN  Outcome: Resolved/Met  2020 1124 by Sudeep KATHLEEN  Outcome: Progressing Towards Goal  Goal: Diagnostic Test/Procedures  1/28/2020 1127 by Jorge Ramus A  Outcome: Resolved/Met  1/28/2020 1124 by Jorge Ramus A  Outcome: Progressing Towards Goal  Goal: Nutrition/Diet  1/28/2020 1127 by Jorge Ramus A  Outcome: Resolved/Met  1/28/2020 1124 by Jorge Ramus A  Outcome: Progressing Towards Goal  Goal: Discharge Planning  1/28/2020 1127 by Jorge Ramus A  Outcome: Resolved/Met  1/28/2020 1124 by Jorge Ramus A  Outcome: Progressing Towards Goal  Goal: Medications  1/28/2020 1127 by Jorge Ramus A  Outcome: Resolved/Met  1/28/2020 1124 by Jorge Ramus A  Outcome: Progressing Towards Goal  Goal: Respiratory  1/28/2020 1127 by Jorge Ramus A  Outcome: Resolved/Met  1/28/2020 1124 by Jorge Ramus A  Outcome: Progressing Towards Goal  Goal: Treatments/Interventions/Procedures  1/28/2020 1127 by Jorge Ramus A  Outcome: Resolved/Met  1/28/2020 1124 by Jorge Ramus A  Outcome: Progressing Towards Goal  Goal: Psychosocial  1/28/2020 1127 by Jorge Ramus A  Outcome: Resolved/Met  1/28/2020 1124 by Jorge Ramus A  Outcome: Progressing Towards Goal  Goal: *Vital signs within defined limits  1/28/2020 1127 by Jorge Ramus A  Outcome: Resolved/Met  1/28/2020 1124 by Jorge Ramus A  Outcome: Progressing Towards Goal  Goal: *Labs within defined limits  1/28/2020 1127 by Jorge Ramus A  Outcome: Resolved/Met  1/28/2020 1124 by Jorge Ramus A  Outcome: Progressing Towards Goal  Goal: *Hemodynamically stable  1/28/2020 1127 by Jorge Ramus A  Outcome: Resolved/Met  1/28/2020 1124 by Jorge Ramus A  Outcome: Progressing Towards Goal  Goal: *Optimal pain control at patient's stated goal  1/28/2020 1127 by Jorge Ramus A  Outcome: Resolved/Met  1/28/2020 1124 by Jorge Ramus A  Outcome: Progressing Towards Goal  Goal: *Participates in infant care  2020 1127 by Phyllisa Peak A  Outcome: Resolved/Met  2020 1124 by Phyllisa Peak A  Outcome: Progressing Towards Goal  Goal: *Demonstrates progressive activity  2020 1127 by Phyllisa Peak A  Outcome: Resolved/Met  2020 1124 by Phyllisa Peak A  Outcome: Progressing Towards Goal  Goal: *Tolerating diet  2020 1127 by Donte Hagan A  Outcome: Resolved/Met  2020 1124 by Phyllisa Peak A  Outcome: Progressing Towards Goal  Goal: *Performs self perineal care  2020 1127 by Phyllisa Peak A  Outcome: Resolved/Met  2020 1124 by Genecononra Peak A  Outcome: Progressing Towards Goal     Problem: Lactation Care Plan  Goal: *Infant latching appropriately  2020 1127 by Phyllisa Danya A  Outcome: Resolved/Met  2020 1124 by Phyllisa Peak A  Outcome: Progressing Towards Goal  Goal: *Weight loss less than 10% of birth weight  2020 1127 by Donte Peak A  Outcome: Resolved/Met  2020 1124 by Phyllisa Peak A  Outcome: Progressing Towards Goal     Problem: Patient Education: Go to Patient Education Activity  Goal: Patient/Family Education  2020 1127 by Donte Hagan A  Outcome: Resolved/Met  2020 1124 by Geneconnora Peak A  Outcome: Progressing Towards Goal     Problem:  Delivery: Postpartum Day 2  Goal: Activity/Safety  2020 1127 by Donte Hagan A  Outcome: Resolved/Met  2020 1124 by Donte Peak A  Outcome: Progressing Towards Goal  Goal: Consults, if ordered  2020 1127 by Donte Peak A  Outcome: Resolved/Met  2020 1124 by Donte Hagan A  Outcome: Progressing Towards Goal  Goal: Nutrition/Diet  2020 1127 by Donte Hagan A  Outcome: Resolved/Met  2020 1124 by Phyllisa Peak A  Outcome: Progressing Towards Goal  Goal: Discharge Planning  2020 1127 by Mani Walter Aneta A  Outcome: Resolved/Met  1/28/2020 1124 by Lima Columbia A  Outcome: Progressing Towards Goal  Goal: Medications  1/28/2020 1127 by Lima Columbia A  Outcome: Resolved/Met  1/28/2020 1124 by Lmia Columbia A  Outcome: Progressing Towards Goal  Goal: Treatments/Interventions/Procedures  1/28/2020 1127 by Lima Columbia A  Outcome: Resolved/Met  1/28/2020 1124 by Lima Columbia A  Outcome: Progressing Towards Goal  Goal: Psychosocial  1/28/2020 1127 by Lima Columbia A  Outcome: Resolved/Met  1/28/2020 1124 by Lima Columbia A  Outcome: Progressing Towards Goal  Goal: *Vital signs within defined limits  1/28/2020 1127 by Lima Columbia A  Outcome: Resolved/Met  1/28/2020 1124 by Lima Columbia A  Outcome: Progressing Towards Goal  Goal: *Labs within defined limits  1/28/2020 1127 by Lima Columbia A  Outcome: Resolved/Met  1/28/2020 1124 by Lima Columbia A  Outcome: Progressing Towards Goal  Goal: *Hemodynamically stable  1/28/2020 1127 by Lima Columbia A  Outcome: Resolved/Met  1/28/2020 1124 by Lima Columbia A  Outcome: Progressing Towards Goal  Goal: *Optimal pain control at patient's stated goal  1/28/2020 1127 by Lima Columbia A  Outcome: Resolved/Met  1/28/2020 1124 by Lima Columbia A  Outcome: Progressing Towards Goal  Goal: *Participates in infant care  1/28/2020 1127 by Lima Columbia A  Outcome: Resolved/Met  1/28/2020 1124 by Lima Columbia A  Outcome: Progressing Towards Goal  Goal: *Demonstrates progressive activity  1/28/2020 1127 by Lima Columbia A  Outcome: Resolved/Met  1/28/2020 1124 by Lima Columbia A  Outcome: Progressing Towards Goal  Goal: *Appropriate parent-infant bonding  1/28/2020 1127 by Lima Columbia A  Outcome: Resolved/Met  1/28/2020 1124 by Lima Columbia A  Outcome: Progressing Towards Goal  Goal: *Tolerating diet  1/28/2020 1127 by Ania KATHLEEN  Outcome: Resolved/Met  2020 1124 by Ania KATHLEEN  Outcome: Progressing Towards Goal     Problem:  Delivery: Postpartum Day 3  Goal: Activity/Safety  Outcome: Resolved/Met  Goal: Consults, if ordered  Outcome: Resolved/Met  Goal: Nutrition/Diet  Outcome: Resolved/Met  Goal: Discharge Planning  Outcome: Resolved/Met  Goal: Medications  Outcome: Resolved/Met  Goal: Treatments/Interventions/Procedures  Outcome: Resolved/Met  Goal: Psychosocial  Outcome: Resolved/Met     Problem:  Delivery: Discharge Outcomes  Goal: *Follow-up appointments as indicated  Outcome: Resolved/Met  Goal: *Describes available resources and support systems  Outcome: Resolved/Met  Goal: *No signs and symptoms of infection  Outcome: Resolved/Met  Goal: *Birth certificate information completed  Outcome: Resolved/Met  Goal: *Received and verbalizes understanding of discharge plan and instructions  Outcome: Resolved/Met  Goal: *Vital signs within defined limits  Outcome: Resolved/Met  Goal: *Labs within defined limits  Outcome: Resolved/Met  Goal: *Hemodynamically stable  Outcome: Resolved/Met  Goal: *Optimal pain control at patient's stated goal  Outcome: Resolved/Met  Goal: *Participates in infant care  Outcome: Resolved/Met  Goal: *Demonstrates progressive activity  Outcome: Resolved/Met  Goal: *Appropriate parent-infant bonding  Outcome: Resolved/Met  Goal: *Tolerating diet  Outcome: Resolved/Met  Goal: *Verbalizes name, dosage, time, side effects, and number of days to continue medications  Outcome: Resolved/Met  Goal: *Influenza vaccine administered (October-March)  Outcome: Resolved/Met

## 2020-01-28 NOTE — ROUTINE PROCESS
Bedside and Verbal shift change report given to Malena Lou (oncoming nurse) by Soren Rosario RN (offgoing nurse). Report included the following information SBAR, Intake/Output, MAR and Recent Results.

## 2020-01-28 NOTE — DISCHARGE INSTRUCTIONS
CONGRATULATIONS ON THE BIRTH OF YOUR BABY! The first six weeks after childbirth is a time of physical and emotional adjustment. This handout will help to answer questions and provide guidance during the postpartum period. Every family's adjustment is unique, so please call if you have further concerns. At anytime we can be reached at [phone]. During office hours please ask to speak to a charge nurse. After hours, the answering service will take a message and the Nurse-Midwife on-call will return your call. If your question can wait until office hours: Monday-Friday 8:30-4:00, please do so. For emergencies or urgent concerns do not hesitate to call us after hours. DIET  Your body is in need of a well-balanced, high protein diet to recuperate from birth. Please continue to take your prenatal vitamins for 6 weeks or as long as you are breastfeeding. Continue to drink at least 6-8 cups of water or other liquid a day. A breastfeeding mother also needs extra protein, calories and calcium containing foods. It is a good rule to drink fluids with every feeding in order to maintain an adequate milk supply and avoid dehydration. Your baby will probably not be bothered by things in your diet, but if the baby seems extremely fussy or develops a rash, you may want to discuss possible food intolerances with your baby's care provider. PAIN MEDICATIONS  Acetaminophen (Tylenol), ibuprofen (Motrin), or other prescribed pain medication may be taken as directed to relieve discomfort. The above medications pass in very minimal amounts into the breast milk and usually will not cause problems. There are medications that may affect the baby, so please consult your baby's care provider before taking medication. If you are breastfeeding, be sure to mention this to any care provider you see so that medications that are safe may be selected.   There is an excellent resource called [employer] that is a resource for medication safety in pregnancy and lactation. You can visit their website at [employer]/ or call them toll free at [phone] if you have any questions about medication safety. UTERINE INVOLUTION / VAGINAL BLEEDING  Involution is the process of the uterus returning to pre-pregnant size. It will take approximately six weeks for this process to occur. To achieve this size your uterus becomes firm to slow bleeding loss from the placental site. The first 7 days after birth, the bleeding is red and heavy. It may change with your activity and position. Some small clots are normal.   After ten days, the bleeding should be pale pink and slowed considerably. The next several weeks may progress to a pink, mucousy discharge. This may continue for 6-8 weeks, depending on your activity. During the first four weeks after delivery we recommend using sanitary pads instead of tampons. Douching should also be avoided, but it is fine to take a tub bath so long as the tub is very clean. ACTIVITY/EXERCISE  Adequate rest is essential to recovery. Try to rest or sleep when the baby sleeps. After two weeks, you may begin going for short walks, doing Kegel exercises and abdominal crunches. Avoid heavy, jarring or aerobic exercises. Remember to start out slowly and build up to your previous fitness level. Use common sense and don't overdo as rest is important and the benefits of increased rest are a quicker recovery. For the first two weeks after a  try to limit trips up or down steps. Do not lift anything heavier than the baby during this time. Lifting the baby or other objects should be done by bending at the knees rather than the waist.  Driving should be avoided during the first two to three weeks until you have the strength to push firmly on the brakes in case of an emergency. You may ride as a passenger, but DO wear a seat belt at all times.      After a few weeks, you may resume normal activity at whatever pace is comfortable for you. Exercise may also be resumed gradually. Walking is a good way to start. Finally, try to be reasonable in your expectations. Caring for a new baby after major surgery can be quite trying. Arrange for assistance at home to ensure that you get enough rest.     POSTPARTUM CHECK  You may call the office when you return home to set up a postpartum visit. Most patients will be seen at 6 weeks after delivery, but after a  or other circumstances you may be seen in 2 weeks or less. If you are discharged from the hospital with staples that must be removed, you will be asked to come in sooner. At your postpartum visit, a pelvic exam may be performed. If you are having any problems or concerns, please do not hesitate to call. Once again our number is [phone]. MOOD CHANGES  Significant hormonal changes occur in the days following delivery, and as a result, many women experience brief episodes of tearfulness or feeling \"blue. \"  These emotional swings may be made worse by lack of sleep and by the adjustments inherent in becoming a mother. For some women, these fluctuations are minor. For others, they are overwhelming; creating feelings of anxiety, depression, or the inability to cope. If you have difficulty functioning as a result of feeling down, or if the mood changes seem severe, do not improve, or result is thoughts of harming yourself or others CALL RIGHT AWAY. PERINEAL CARE  The basic goals of perineal care are to prevent infection, to relieve pain and promote healing. Your stitches will dissolve in four to six weeks, and do not need to be removed. After urinating, please continue to clean with warm water from front to back. Please continue sitz baths as instructed twice a day for a week or as needed. Call the office if you see pus in the suture site, or have unusual or severe swelling or pain that seems to be getting worse. INCISION CARE  If you had a , clean and dry the incision gently as you would the rest of your body. Washing over the area with soap and water, and showering are fine. If steri-strips are present they will gradually come off with time. Tub baths are permitted. You may experience numbness and burning in the area surrounding the incision which usually resolves gradually over the next several weeks or months. RETURN OF MENSTRUATION  Your first menstrual period may occur as soon as four to six weeks after your delivery if you are not breast-feeding. If breast-feeding it is more difficult to predict when your first period will occur. Even if you are not yet menstruating, you may be ovulating and it may be possible to conceive again. It is common for your first period after childbirth to be very heavy with an increased amount of cramping. BREASTS  Breast-feeding Mothers: Colostrum is excreted in the first 24-72 hours. Mature breast milk will appear on the 2nd to 5th day. Engorgement may occur with the mature milk making your breasts feel warm and very full. Frequent feedings will make you more comfortable. Babies do not nurse on regular schedules. Nursing every 1 1/2 to 2 hours is normal and frequent feeding DOES NOT mean you are not making enough milk. To avoid nipple confusion, do not give bottles for the first 4 weeks. Growth spurts are common and may require more frequent feedings. This is the way baby increases your milk supply. During a growth spurt, you may feel you are feeding very frequently and that your breasts are \"empty. \"  Don't worry, your milk is produced by supply and demand so this increased frequency of feeding will increase your milk supply within 48 hours. Sore nipples may occur with frequent feedings and are sometimes also caused by improper latch. Check for a proper latch. Baby should have a wide open mouth.   Use different positions at each feeding if possible. Express a small amount of colostrum or breast milk onto the sore area and leave bra flaps unlatched until dry. The lactation consultant at [facility] is available for outpatient consultation without charge. Call [phone] from Monday-Friday 9:00am- 3:00pm to arrange an outpatient appointment with her. Local [facility] and consultants may also be very helpful. If You Are Not Breast-feeding: You will experience swelling, engorgement and some milk production. There are no safe medications available to stop lactation. Some remedies for engorgement include: wearing a tight bra, ice packs and cold green cabbage leaves placed between the breast and your bra. Change these frequently. Tylenol or Motrin should help with the discomfort. SEXUAL ADJUSTMENTS  We recommend that you wait at least four weeks before resuming sexual intercourse. A sore perineum, a demanding baby and fatigue will certainly affect your ability to enjoy lovemaking! A vaginal lubricant is recommended to help with any dryness. It is very important to remember that you will ovulate BEFORE your first period and can conceive. If you do not wish another pregnancy right away, please take precautions to avoid pregnancy. If you would like a prescription method of birth control, please discuss this with us at your 6 week visit. ELIMINATION  We remind all postpartum patients that it may take a few days for your bowels to return to normal, especially if you had a long labor. For those who had C-sections or severe lacerations, we recommend that you use a stool softener twice daily for at least two weeks. Many stool softeners are over-the-counter. Colace (Docusate Sodium) is recommended. Bulk forming agents such as Metamucil or Fibercon may be used daily in addition to a stool softener to promote regular bowel movements. Eating fresh fruits and vegetables along with whole grains is helpful as well.   Do not be afraid to have a bowel movement as your stitches will not \"come out\" in the course of having a bowel movement. Urination may be difficult due to soreness around the urethra, or as an after effect of epidural.  This is temporary and can be helped  by squirting water over the perineum or try going in the shower. Hemorrhoids are common after birth. Tucks pads, Anusol cream and avoiding constipation are helpful. If constipation does occur, you may take Milk of Magnesia or Senekot according to the package instructions. DANGER SIGNS! CALL WITHOUT DELAY IF YOU ARE EXPERIENCING ANY OF THE FOLLOWING:  * Unusually heavy bleeding, soaking more than 1 or more pads in an hour. * Vaginal discharge with strong foul odor. * Fever of 101 or higher  * Unusual pain or tenderness in the abdominal area. * If breasts are red, hot or have a painful lump. * Depression that persists longer than 1-2 weeks or is severe. * Any urinary frequency accompanied by urgency or pain. * A lump in leg or calf especially if painful, warm or red. We thank you for choosing us for your prenatal care and/or delivery. We wish you all happiness and health with your baby for his or her lifetime!        Valery Brambila MD     Patient armband removed and shredded

## 2020-01-28 NOTE — PROGRESS NOTES
1945: Sitting up in bed feeding infant reviewed plan of care, verbalizes understanding. Family at bedside. 2140: Assessment and vitals completed, abdominal binder in place denies needs or complaints at this time. 2205: Infant out to room id band verified. 0105: Complaint of pain medicated with 800 mg motrin and 1 norco per orders see mar. Ice water and apple juice given per request denies needs or complaints at this time.  at bedside. 0325: Sleeping supine respirations unlabored  at bedside. 9192: Resting supine vitals and assessment  Denies needs or complaints,  at bedside. 3211: Sleeping on right side, awoke easily, denies needs or complaints at this time.  at bedside. Assessment and vitals within normal limits. Voiding and stooling well. Bottle feeding without difficulty.

## 2020-01-28 NOTE — PROGRESS NOTES
Problem: Patient Education: Go to Patient Education Activity  Goal: Patient/Family Education  Outcome: Progressing Towards Goal     Problem: Vaginal Delivery: Day of Deliver-Laboring  Goal: Off Pathway (Use only if patient is Off Pathway)  Outcome: Progressing Towards Goal  Goal: Activity/Safety  Outcome: Progressing Towards Goal  Goal: Consults, if ordered  Outcome: Progressing Towards Goal  Goal: Diagnostic Test/Procedures  Outcome: Progressing Towards Goal  Goal: Nutrition/Diet  Outcome: Progressing Towards Goal  Goal: Discharge Planning  Outcome: Progressing Towards Goal  Goal: Medications  Outcome: Progressing Towards Goal  Goal: Respiratory  Outcome: Progressing Towards Goal  Goal: Treatments/Interventions/Procedures  Outcome: Progressing Towards Goal  Goal: *Vital signs within defined limits  Outcome: Progressing Towards Goal  Goal: *Labs within defined limits  Outcome: Progressing Towards Goal  Goal: *Hemodynamically stable  Outcome: Progressing Towards Goal  Goal: *Optimal pain control at patient's stated goal  Outcome: Progressing Towards Goal     Problem: Falls - Risk of  Goal: *Absence of Falls  Description  Document Dionte Chen Fall Risk and appropriate interventions in the flowsheet. Outcome: Progressing Towards Goal  Note: Fall Risk Interventions:  Mobility Interventions: Patient to call before getting OOB         Medication Interventions: Teach patient to arise slowly    Elimination Interventions: Call light in reach              Problem: Patient Education: Go to Patient Education Activity  Goal: Patient/Family Education  Outcome: Progressing Towards Goal     Problem: Pressure Injury - Risk of  Goal: *Prevention of pressure injury  Description  Document Keyur Scale and appropriate interventions in the flowsheet. Outcome: Progressing Towards Goal  Note: Pressure Injury Interventions:             Activity Interventions: Pressure redistribution bed/mattress(bed type)    Mobility Interventions: HOB 30 degrees or less                          Problem: Patient Education: Go to Patient Education Activity  Goal: Patient/Family Education  Outcome: Progressing Towards Goal     Problem:  Delivery: Day of Delivery  Goal: Activity/Safety  Outcome: Progressing Towards Goal  Goal: Consults, if ordered  Outcome: Progressing Towards Goal  Goal: Diagnostic Test/Procedures  Outcome: Progressing Towards Goal  Goal: Nutrition/Diet  Outcome: Progressing Towards Goal  Goal: Discharge Planning  Outcome: Progressing Towards Goal  Goal: Medications  Outcome: Progressing Towards Goal  Goal: Respiratory  Outcome: Progressing Towards Goal  Goal: Treatments/Interventions/Procedures  Outcome: Progressing Towards Goal  Goal: Psychosocial  Outcome: Progressing Towards Goal  Goal: *Vital signs within defined limits  Outcome: Progressing Towards Goal  Goal: *Labs within defined limits  Outcome: Progressing Towards Goal  Goal: *Hemodynamically stable  Outcome: Progressing Towards Goal  Goal: *Optimal pain control at patient's stated goal  Outcome: Progressing Towards Goal  Goal: *Participates in infant care  Outcome: Progressing Towards Goal  Goal: *Demonstrates progressive activity  Outcome: Progressing Towards Goal  Goal: *Tolerating diet  Outcome: Progressing Towards Goal     Problem:  Delivery: Postpartum Day 1  Goal: Activity/Safety  Outcome: Progressing Towards Goal  Goal: Consults, if ordered  Outcome: Progressing Towards Goal  Goal: Diagnostic Test/Procedures  Outcome: Progressing Towards Goal  Goal: Nutrition/Diet  Outcome: Progressing Towards Goal  Goal: Discharge Planning  Outcome: Progressing Towards Goal  Goal: Medications  Outcome: Progressing Towards Goal  Goal: Respiratory  Outcome: Progressing Towards Goal  Goal: Treatments/Interventions/Procedures  Outcome: Progressing Towards Goal  Goal: Psychosocial  Outcome: Progressing Towards Goal  Goal: *Vital signs within defined limits  Outcome: Progressing Towards Goal  Goal: *Labs within defined limits  Outcome: Progressing Towards Goal  Goal: *Hemodynamically stable  Outcome: Progressing Towards Goal  Goal: *Optimal pain control at patient's stated goal  Outcome: Progressing Towards Goal  Goal: *Participates in infant care  Outcome: Progressing Towards Goal  Goal: *Demonstrates progressive activity  Outcome: Progressing Towards Goal  Goal: *Tolerating diet  Outcome: Progressing Towards Goal  Goal: *Performs self perineal care  Outcome: Progressing Towards Goal     Problem: Lactation Care Plan  Goal: *Infant latching appropriately  Outcome: Progressing Towards Goal  Goal: *Weight loss less than 10% of birth weight  Outcome: Progressing Towards Goal     Problem: Patient Education: Go to Patient Education Activity  Goal: Patient/Family Education  Outcome: Progressing Towards Goal     Problem:  Delivery: Postpartum Day 2  Goal: Activity/Safety  Outcome: Progressing Towards Goal  Goal: Consults, if ordered  Outcome: Progressing Towards Goal  Goal: Nutrition/Diet  Outcome: Progressing Towards Goal  Goal: Discharge Planning  Outcome: Progressing Towards Goal  Goal: Medications  Outcome: Progressing Towards Goal  Goal: Treatments/Interventions/Procedures  Outcome: Progressing Towards Goal  Goal: Psychosocial  Outcome: Progressing Towards Goal  Goal: *Vital signs within defined limits  Outcome: Progressing Towards Goal  Goal: *Labs within defined limits  Outcome: Progressing Towards Goal  Goal: *Hemodynamically stable  Outcome: Progressing Towards Goal  Goal: *Optimal pain control at patient's stated goal  Outcome: Progressing Towards Goal  Goal: *Participates in infant care  Outcome: Progressing Towards Goal  Goal: *Demonstrates progressive activity  Outcome: Progressing Towards Goal  Goal: *Appropriate parent-infant bonding  Outcome: Progressing Towards Goal  Goal: *Tolerating diet  Outcome: Progressing Towards Goal

## 2020-01-28 NOTE — DISCHARGE SUMMARY
Obstetrical Discharge Summary     Name: Maddie Spear MRN: [id number]  SSN: [id number]    YOB: 1985  Age: 30 y.o. Sex: female      Admit Date: 2020    Discharge Date: 2020     Admitting Physician: Ubaldo Mccoy MD     Attending Physician:  Kaila Valencia MD     Admission Diagnoses: Labor and delivery indication for care or intervention [O75.9]    Discharge Diagnoses:   Information for the patient's :  Kelly Pettit [[id number]]   Delivery of a 2.91 kg male infant via , Low Transverse on 2020 at 9:49 PM  by Ubaldo Mccoy. Apgars were 8  and 9 . Additional Diagnoses:   Hospital Problems  Date Reviewed: 2020          Codes Class Noted POA    Postpartum care following  delivery ICD-10-CM: Z39.2  ICD-9-CM: V24.2  2020 Unknown        Group B streptococcal infection during pregnancy ICD-10-CM: O98.819, B95.1  ICD-9-CM: 647.80, 041.02  2020 Yes        Labor and delivery indication for care or intervention ICD-10-CM: O75.9  ICD-9-CM: 659.90  2020 Unknown        Obesity, morbid ([facility] [address] ICD-10-CM: E66.01  ICD-9-CM: 278.01  2017 Yes        Hyperthyroidism ICD-10-CM: E05.90  ICD-9-CM: 242.90  2016 Yes        * (Principal) Hypertension ICD-10-CM: I10  ICD-9-CM: 401.9  10/27/2015 Yes             Lab Results   Component Value Date/Time    Rubella, External IMMUNE 2019    GrBStrep, External POSITIVE 2020       Immunization(s): There is no immunization history for the selected administration types on file for this patient. Labs: No results found for this or any previous visit (from the past 24 hour(s)).     Exam:  Frances Number:  Alert & oriented, no apparent distress  CV:  RRR  Pulm:  CTAB  Abdomen:  Soft, non distended, nontender to palpation, firm fundus below umbilicus  Extremities:  LE b/l without clubbing/cyanosis/edema/redness, nontender to palpation    Hospital Course: 30 y.o. Megan Stacy PPD#3 s/p 1LTCS at 37.2wks for FTP and fetal intolerance of labor after IOL for [facility] on Labetalol 200mg po bid with increasing BPs. She progressed to 5cm. She delivered a living male infant, declined circumcision. Postpartum patient is doing well. Ambulating, voiding, passing flatus, tolerating regular diet, minimal lochia, pain well controlled with po pain meds. +BM. She is breastfeeding without difficulty. Denies fevers, chills, HA, visual changes, RUQ/epigastric pain, chest pain, shortness of breath, nausea, vomiting, diarrhea, dizziness, pain/swelling/redness is lower extremities. She desires to go home. Discharge instructions and precautions reviewed. BP remains stable on Labetalol 200mg po bid, and she will continue on DC and f/u in 1 wk. Patient Instructions:   Current Discharge Medication List      START taking these medications    Details   docusate sodium (COLACE) 100 mg capsule Take 1 Cap by mouth two (2) times daily as needed for Constipation for up to 90 days. Qty: 60 Cap, Refills: 1      HYDROcodone-acetaminophen (NORCO) 5-325 mg per tablet Take 1 Tab by mouth every eight (8) hours as needed for Pain for up to 14 days. Max Daily Amount: 3 Tabs. Qty: 15 Tab, Refills: 0    Associated Diagnoses: Postpartum care following  delivery      ibuprofen (MOTRIN) 800 mg tablet Take 1 Tab by mouth every eight (8) hours as needed for Pain. Qty: 30 Tab, Refills: 0         CONTINUE these medications which have CHANGED    Details   labetaloL (NORMODYNE) 100 mg tablet Take 2 tablets po bid  Qty: 60 Tab, Refills: 1         CONTINUE these medications which have NOT CHANGED    Details   prenatal vit-iron fumarate-fa (PRENATAL PLUS WITH IRON) 28 mg iron- 800 mcg tab Take 1 Tab by mouth daily. Qty: 100 Tab, Refills: 4    Associated Diagnoses: Hyperthyroidism; Positive pregnancy test             Reference my discharge instructions.     Follow-up Appointments   Procedures    FOLLOW UP VISIT Appointment in: One Week Follow up with [facility] in 1 week for BP check     Follow up with [facility] in 1 week for BP check     Standing Status:   Standing     Number of Occurrences:   1     Order Specific Question:   Appointment in     Answer:    One Week        Signed By:  Yanet Corea MD     January 28, 2020

## 2020-01-28 NOTE — PROGRESS NOTES
0725 Bedside and Verbal shift change report given to [address] RN (oncoming nurse) by Jose Yadav (offgoing nurse). Report included the following information SBAR, Kardex, Intake/Output, MAR and Recent Results.      0800-Attempted vitals, parents and infant resting requested I come back.     0930 Vitals taken, patient assessment done. Baby feeding, mother requests more formula.     1100  Mother up and showering, concerned about incision, rechecked incision C/D/I.     1300 Discharge instructions reviewed with both Mother and Father both verbalized understanding and all questions asked, answered by this nurse.   E-sign not signed by patient.       1345  Patient and infant discharged, no further concerns at this time

## 2020-07-02 ENCOUNTER — TELEPHONE (OUTPATIENT)
Dept: FAMILY MEDICINE CLINIC | Age: 35
End: 2020-07-02

## 2020-07-08 ENCOUNTER — OFFICE VISIT (OUTPATIENT)
Dept: FAMILY MEDICINE CLINIC | Age: 35
End: 2020-07-08

## 2020-07-08 VITALS
HEART RATE: 95 BPM | HEIGHT: 68 IN | DIASTOLIC BLOOD PRESSURE: 89 MMHG | RESPIRATION RATE: 20 BRPM | TEMPERATURE: 96.8 F | SYSTOLIC BLOOD PRESSURE: 155 MMHG | WEIGHT: 293 LBS | BODY MASS INDEX: 44.41 KG/M2 | OXYGEN SATURATION: 99 %

## 2020-07-08 DIAGNOSIS — E66.01 MORBID OBESITY WITH BMI OF 45.0-49.9, ADULT (HCC): Primary | ICD-10-CM

## 2020-07-08 DIAGNOSIS — I10 ESSENTIAL HYPERTENSION WITH GOAL BLOOD PRESSURE LESS THAN 130/85: ICD-10-CM

## 2020-07-08 DIAGNOSIS — E66.01 MORBID OBESITY WITH BMI OF 45.0-49.9, ADULT (HCC): ICD-10-CM

## 2020-07-08 RX ORDER — VERAPAMIL HYDROCHLORIDE 300 MG/1
CAPSULE, EXTENDED RELEASE ORAL
Qty: 90 CAP | Refills: 3 | Status: SHIPPED | OUTPATIENT
Start: 2020-07-08 | End: 2021-09-13 | Stop reason: SDUPTHER

## 2020-07-08 RX ORDER — METFORMIN HYDROCHLORIDE 500 MG/1
500 TABLET ORAL
Qty: 30 TAB | Refills: 1 | Status: SHIPPED | OUTPATIENT
Start: 2020-07-08 | End: 2020-07-13

## 2020-07-08 RX ORDER — VERAPAMIL HYDROCHLORIDE 300 MG/1
CAPSULE, EXTENDED RELEASE ORAL
COMMUNITY
End: 2020-07-08 | Stop reason: SDUPTHER

## 2020-07-08 RX ORDER — TELMISARTAN 20 MG/1
20 TABLET ORAL DAILY
Qty: 30 TAB | Refills: 1 | Status: SHIPPED | OUTPATIENT
Start: 2020-07-08 | End: 2020-07-13

## 2020-07-08 NOTE — PROGRESS NOTES
Isaac Euceda is a 30 y.o.  female and presents with    Chief Complaint   Patient presents with    Hypertension    Blood Pressure Check     Subjective:  Hypertension  Patient is here for follow-up of hypertension. She indicates that she is feeling well and denies any symptoms referable to her hypertension. She is not exercising and is not adherent to low salt diet. Blood pressure is not well controlled at home. Use of agents associated with hypertension: amphetamines. She is wanting to lose weight. She lost 30 lbs with the keto diet; she then became pregnant and gained the weight. She is now 5 months post partum s/p c section and needs motivation to help with weight loss. ROS   General ROS: negative for - chills, fever; + weight gain  Psychological ROS: negative for - anxiety or depression  Ophthalmic ROS: negative for - blurry vision  ENT ROS: positive for - headaches, negative for nasal congestion or sore throat  Endocrine ROS: negative for - polydipsia/polyuria or temperature intolerance  Respiratory ROS: no cough, shortness of breath, or wheezing  Cardiovascular ROS: no chest pain or dyspnea on exertion  Genito-Urinary ROS: no dysuria, trouble voiding, or hematuria  Musculoskeletal ROS: negative for - joint pain or muscle pain  Neurological ROS: negative for - numbness/tingling or weakness  Dermatological ROS: negative for - rash or skin lesion changes     All other systems reviewed and are negative.     Objective:  Vitals:    07/08/20 1547 07/08/20 1551   BP: (!) 190/117 155/89   Pulse: 95 95   Resp: 20    Temp: 96.8 °F (36 °C)    TempSrc: Oral    SpO2: 99%    Weight: 299 lb 12.8 oz (136 kg)    Height: 5' 8\" (1.727 m)    PainSc:   0 - No pain    LMP: 07/01/2020       alert, well appearing, and in no distress, oriented to person, place, and time and morbidly obese  Mental status - normal mood, behavior, speech, dress, motor activity, and thought processes  Chest - clear to auscultation, no wheezes, rales or rhonchi, symmetric air entry  Heart - normal rate, regular rhythm, normal S1, S2, no murmurs, rubs, clicks or gallops    Assessment/Plan:    1. Morbid obesity with BMI of 45.0-49.9, adult ([facility] [address]  I have reviewed/discussed the above normal BMI with the patient. I have recommended the following interventions: dietary management education, guidance, and counseling and encourage exercise . Donato Kevin RESTART METFORMIN    2. Essential hypertension with goal blood pressure less than 130/85  Start arb  - telmisartan (MICARDIS) 20 mg tablet; Take 1 Tab by mouth daily. Dispense: 30 Tab; Refill: 1  - verapamil ER (VERELAN PM) 300 mg capsule; Take 1 capsule daily  Dispense: 90 Cap; Refill: 3      Lab review: no lab studies available for review at time of visit      I have discussed the diagnosis with the patient and the intended plan as seen in the above orders. The patient has received an after-visit summary and questions were answered concerning future plans. I have discussed medication side effects and warnings with the patient as well. I have reviewed the plan of care with the patient, accepted their input and they are in agreement with the treatment goals.

## 2020-07-08 NOTE — PROGRESS NOTES
Room #  16    Chief Complaint:    Blood pressure check    HPI:    Shila Loja is a 30 y.o. female who presents today for c/o blood pressure check    1. Have you been to the ER, urgent care clinic since your last visit? Hospitalized since your last visit? YES When:    2. Have you seen or consulted any other health care providers outside of the [address] [place] [address] since your last visit? Include any pap smears or colon screening. NO  When :  Reason:    Last  Checked na  Last UDS Checked na  Last Pain contract signed: na    Consent:  She and/or health care decision maker is aware that that she may receive a bill for this telephone service, depending on her insurance coverage, and has provided verbal consent to proceed: Yes      Health Maintenance reviewed Yes    Health Maintenance Due   Topic Date Due    DTaP/Tdap/Td series (1 - Tdap) 08/31/2008    PAP AKA CERVICAL CYTOLOGY  07/27/2018    A1C test (Diabetic or Prediabetic)  06/25/2020     Depression Screening:  3 most recent PHQ Screens 7/8/2020   Little interest or pleasure in doing things Not at all   Feeling down, depressed, irritable, or hopeless Not at all   Total Score PHQ 2 0     Learning Assessment:  Learning Assessment 5/21/2019   PRIMARY LEARNER Patient   HIGHEST LEVEL OF EDUCATION - PRIMARY LEARNER  4 YEARS OF COLLEGE   BARRIERS PRIMARY LEARNER NONE   CO-LEARNER CAREGIVER No   PRIMARY LANGUAGE ENGLISH   LEARNER PREFERENCE PRIMARY DEMONSTRATION   ANSWERED BY patient   RELATIONSHIP SELF     Abuse Screening:  Abuse Screening Questionnaire 6/14/2019   Do you ever feel afraid of your partner? N   Are you in a relationship with someone who physically or mentally threatens you? N   Is it safe for you to go home? Y     Fall Risk  Fall Risk Assessment, last 12 mths 6/14/2019   Able to walk? Yes   Fall in past 12 months?  No

## 2020-07-13 RX ORDER — METFORMIN HYDROCHLORIDE 500 MG/1
TABLET ORAL
Qty: 90 TAB | Refills: 3 | Status: SHIPPED | OUTPATIENT
Start: 2020-07-13 | End: 2021-07-27 | Stop reason: ALTCHOICE

## 2020-07-13 RX ORDER — TELMISARTAN 20 MG/1
TABLET ORAL
Qty: 90 TAB | Refills: 3 | Status: SHIPPED | OUTPATIENT
Start: 2020-07-13 | End: 2020-08-30

## 2020-07-29 ENCOUNTER — OFFICE VISIT (OUTPATIENT)
Dept: FAMILY MEDICINE CLINIC | Age: 35
End: 2020-07-29

## 2020-07-29 VITALS
TEMPERATURE: 97.5 F | HEIGHT: 68 IN | BODY MASS INDEX: 44.41 KG/M2 | WEIGHT: 293 LBS | DIASTOLIC BLOOD PRESSURE: 102 MMHG | HEART RATE: 88 BPM | SYSTOLIC BLOOD PRESSURE: 157 MMHG | RESPIRATION RATE: 19 BRPM | OXYGEN SATURATION: 100 %

## 2020-07-29 DIAGNOSIS — Z23 ENCOUNTER FOR IMMUNIZATION: ICD-10-CM

## 2020-07-29 DIAGNOSIS — Z00.00 ANNUAL PHYSICAL EXAM: ICD-10-CM

## 2020-07-29 DIAGNOSIS — Z11.1 SCREENING EXAMINATION FOR PULMONARY TUBERCULOSIS: ICD-10-CM

## 2020-07-29 DIAGNOSIS — Z00.00 ANNUAL PHYSICAL EXAM: Primary | ICD-10-CM

## 2020-07-29 DIAGNOSIS — Z23 NEED FOR TDAP VACCINATION: ICD-10-CM

## 2020-07-29 DIAGNOSIS — Z23 NEED FOR HEPATITIS B VACCINATION: ICD-10-CM

## 2020-07-29 NOTE — PROGRESS NOTES
Shreya Hand is a 30 y.o. black female and presents with    Chief Complaint   Patient presents with    Complete Physical       Subjective: Well Adult Physical   Patient here for a comprehensive physical exam.The patient reports problems - hypertension  Do you take any herbs or supplements that were not prescribed by a doctor? no Are you taking calcium supplements? yes Are you taking aspirin daily? not applicable    Hypertension  She has hypertension. She indicates that she is feeling well and denies any symptoms referable to her hypertension. She is not exercising and is not adherent to low salt diet. Blood pressure is not well controlled at home. Use of agents associated with hypertension: amphetamines.     She is wanting to lose weight. She lost 30 lbs with the keto diet; she then became pregnant and gained the weight. She is now 6 months post partum s/p c section and needs motivation to help with weight loss.       ROS   General ROS: negative for - chills, fever; + weight gain  Psychological ROS: negative for - anxiety or depression  Ophthalmic ROS: negative for - blurry vision  ENT ROS: positive for - headaches, negative for nasal congestion or sore throat  Endocrine ROS: negative for - polydipsia/polyuria or temperature intolerance  Respiratory ROS: no cough, shortness of breath, or wheezing  Cardiovascular ROS: no chest pain or dyspnea on exertion  Genito-Urinary ROS: no dysuria, trouble voiding, or hematuria  Musculoskeletal ROS: negative for - joint pain or muscle pain  Neurological ROS: negative for - numbness/tingling or weakness  Dermatological ROS: negative for - rash or skin lesion changes     All other systems reviewed and are negative.       Objective:  Vitals:    07/29/20 1427 07/29/20 1431   BP: (!) 171/100 (!) 157/102   Pulse: 92 88   Resp: 19    Temp: 97.5 °F (36.4 °C)    TempSrc: Oral    SpO2: 100%    Weight: 293 lb 3.2 oz (133 kg)    Height: 5' 8\" (1.727 m)    PainSc:   0 - No pain LMP: 07/24/2020     BMI 44.58 kg/m²       General appearance  alert, cooperative, no distress, appears stated age   Head  Normocephalic, without obvious abnormality, atraumatic   Eyes  conjunctivae/corneas clear. PERRL, EOM's intact. Ears  normal TM's and external ear canals AU   Nose Nares normal. Septum midline. Mucosa normal. No drainage or sinus tenderness. Throat Lips, mucosa, and tongue normal. Teeth and gums normal   Neck supple, symmetrical, trachea midline, no adenopathy, thyroid: not enlarged, symmetric, no tenderness/mass/nodules   Back   symmetric, no curvature. ROM normal.    Lungs   clear to auscultation bilaterally   Breasts  Not examined   Heart  regular rate and rhythm, S1, S2 normal, no murmur, click, rub or gallop   Abdomen   soft, non-tender. Bowel sounds normal. No masses,  No organomegaly   Pelvic Deferred   Extremities extremities normal, atraumatic, no cyanosis or edema   Pulses 2+ and symmetric   Skin Skin color, texture, turgor normal. No rashes or lesions   Lymph nodes Cervical, supraclavicular, and axillary nodes normal.   Neurologic Normal       LABS     TESTS      Assessment/Plan:    1. Annual physical exam  Reviewed preventive recommendations  - MEASLES/MUMPS/RUBELLA IMMUNITY; Future  - VZV AB, IGG; Future    2. Need for Tdap vaccination    - TETANUS, DIPHTHERIA TOXOIDS AND ACELLULAR PERTUSSIS VACCINE (TDAP), IN INDIVIDS. >=7, IM  - LA IMMUNIZ ADMIN,1 SINGLE/COMB VAC/TOXOID    3. Need for hepatitis B vaccination    - HEPATITIS B VACCINE, ADULT DOSAGE, IM  - LA IMMUNIZ ADMIN,1 SINGLE/COMB VAC/TOXOID    4. Encounter for immunization      5. Screening examination for pulmonary tuberculosis    - AMB POC TUBERCULOSIS, INTRADERMAL (SKIN TEST)    Lab review: orders written for new lab studies as appropriate; see orders      I have discussed the diagnosis with the patient and the intended plan as seen in the above orders.   The patient has received an after-visit summary and questions were answered concerning future plans. I have discussed medication side effects and warnings with the patient as well. I have reviewed the plan of care with the patient, accepted their input and they are in agreement with the treatment goals.

## 2020-07-29 NOTE — PROGRESS NOTES
Room # 18     Chief Complaint:    Complete physical with out PAP    HPI:    Elizabeth Suarez is a 30 y.o. female who presents today for c/o complete physical without PAP    1. Have you been to the ER, urgent care clinic since your last visit? Hospitalized since your last visit? NO When:    2. Have you seen or consulted any other health care providers outside of the [address] [place] [address] since your last visit? Include any pap smears or colon screening. YES  When :  Reason:    Last  Checked na  Last UDS Checked na  Last Pain contract signed: na    Consent:  She and/or health care decision maker is aware that that she may receive a bill for this telephone service, depending on her insurance coverage, and has provided verbal consent to proceed: Yes      Health Maintenance reviewed Yes    Health Maintenance Due   Topic Date Due    DTaP/Tdap/Td series (1 - Tdap) 08/31/2008    PAP AKA CERVICAL CYTOLOGY  07/27/2018    A1C test (Diabetic or Prediabetic)  06/25/2020     Depression Screening:  3 most recent PHQ Screens 7/29/2020   Little interest or pleasure in doing things Not at all   Feeling down, depressed, irritable, or hopeless Not at all   Total Score PHQ 2 0     Learning Assessment:  Learning Assessment 5/21/2019   PRIMARY LEARNER Patient   HIGHEST LEVEL OF EDUCATION - PRIMARY LEARNER  4 YEARS OF COLLEGE   BARRIERS PRIMARY LEARNER NONE   CO-LEARNER CAREGIVER No   PRIMARY LANGUAGE ENGLISH   LEARNER PREFERENCE PRIMARY DEMONSTRATION   ANSWERED BY patient   RELATIONSHIP SELF     Abuse Screening:  Abuse Screening Questionnaire 7/29/2020   Do you ever feel afraid of your partner? N   Are you in a relationship with someone who physically or mentally threatens you? N   Is it safe for you to go home? Y     Fall Risk  Fall Risk Assessment, last 12 mths 7/29/2020   Able to walk? Yes   Fall in past 12 months? No              Elizabeth Suarez is a 30 y.o. female who presents for routine immunizations.    She denies any symptoms , reactions or allergies that would exclude them from being immunized today. Risks and adverse reactions were discussed and the VIS was given to them. All questions were addressed. She was observed for 0 min post injection. There were no reactions observed.     Allison Howard LPN

## 2020-07-31 ENCOUNTER — CLINICAL SUPPORT (OUTPATIENT)
Dept: FAMILY MEDICINE CLINIC | Age: 35
End: 2020-07-31

## 2020-07-31 DIAGNOSIS — Z11.1 SCREENING-PULMONARY TB: Primary | ICD-10-CM

## 2020-07-31 LAB
MM INDURATION POC: 0 MM (ref 0–5)
PPD POC: NEGATIVE NEGATIVE

## 2020-07-31 NOTE — PROGRESS NOTES
PPD Reading Note  PPD read and results entered in [address]. Result: 0 mm induration.   Interpretation: negative  If test not read within 48-72 hours of initial placement, patient advised to repeat in other arm 1-3 weeks after this test.  Allergic reaction: no

## 2020-08-03 LAB
MEV IGG SER IA-ACNC: 4.9 AI
MUV IGG SER-ACNC: 2.6 AI
RUBV IGG SERPL IA-ACNC: 5.7 AI
VZV IGM SER IA-ACNC: 5.6 AI

## 2020-08-11 ENCOUNTER — CLINICAL SUPPORT (OUTPATIENT)
Dept: FAMILY MEDICINE CLINIC | Age: 35
End: 2020-08-11

## 2020-08-11 VITALS
BODY MASS INDEX: 44.41 KG/M2 | HEIGHT: 68 IN | WEIGHT: 293 LBS | RESPIRATION RATE: 20 BRPM | DIASTOLIC BLOOD PRESSURE: 85 MMHG | OXYGEN SATURATION: 100 % | HEART RATE: 66 BPM | TEMPERATURE: 97 F | SYSTOLIC BLOOD PRESSURE: 143 MMHG

## 2020-08-11 DIAGNOSIS — Z23 ENCOUNTER FOR IMMUNIZATION: ICD-10-CM

## 2020-08-11 DIAGNOSIS — Z11.1 SCREENING EXAMINATION FOR PULMONARY TUBERCULOSIS: Primary | ICD-10-CM

## 2020-08-11 NOTE — PROGRESS NOTES
Room #  16    Chief Complaint:  PPD place ment  Blood pressure check  HPI:    Armstead Angelucci is a 30 y.o. female who presents today for c/o ppd placement and blood pressure check    1. Have you been to the ER, urgent care clinic since your last visit? Hospitalized since your last visit? NO When:    2. Have you seen or consulted any other health care providers outside of the [address] [place] [address] since your last visit? Include any pap smears or colon screening. NO  When :  Reason:    Last  Checked na  Last UDS Checked na  Last Pain contract signed: na    Consent:  She and/or health care decision maker is aware that that she may receive a bill for this telephone service, depending on her insurance coverage, and has provided verbal consent to proceed: No - Not billable      Health Maintenance reviewed Yes    Health Maintenance Due   Topic Date Due    DTaP/Tdap/Td series (1 - Tdap) 08/31/2008    A1C test (Diabetic or Prediabetic)  06/25/2020    Influenza Age 5 to Adult  08/01/2020     Depression Screening:  3 most recent PHQ Screens 8/11/2020   Little interest or pleasure in doing things Not at all   Feeling down, depressed, irritable, or hopeless Not at all   Total Score PHQ 2 0     Learning Assessment:  Learning Assessment 5/21/2019   PRIMARY LEARNER Patient   HIGHEST LEVEL OF EDUCATION - PRIMARY LEARNER  4 YEARS [address] CAREGIVER No   PRIMARY LANGUAGE ENGLISH   LEARNER PREFERENCE PRIMARY DEMONSTRATION   ANSWERED BY patient   RELATIONSHIP SELF     Abuse Screening:  Abuse Screening Questionnaire 7/29/2020   Do you ever feel afraid of your partner? N   Are you in a relationship with someone who physically or mentally threatens you? N   Is it safe for you to go home? Y     Fall Risk  Fall Risk Assessment, last 12 mths 7/29/2020   Able to walk? Yes   Fall in past 12 months?  No

## 2020-08-14 ENCOUNTER — CLINICAL SUPPORT (OUTPATIENT)
Dept: FAMILY MEDICINE CLINIC | Age: 35
End: 2020-08-14

## 2020-08-14 ENCOUNTER — TELEPHONE (OUTPATIENT)
Dept: FAMILY MEDICINE CLINIC | Age: 35
End: 2020-08-14

## 2020-08-14 DIAGNOSIS — Z11.1 SCREENING EXAMINATION FOR PULMONARY TUBERCULOSIS: Primary | ICD-10-CM

## 2020-08-14 LAB
MM INDURATION POC: 0 MM (ref 0–5)
PPD POC: NEGATIVE NEGATIVE

## 2020-08-14 NOTE — PROGRESS NOTES
PPD Placement note  Ruba Ramsey, 30 y.o. female is here today for placement of PPD test  Reason for PPD test: school  Pt taken PPD test before: yes  Verified in allergy area and with patient that they are not allergic to the products PPD is made of (Phenol or Tween). No: no  Is patient taking any oral or IV steroid medication now or have they taken it in the last month? no  Has the patient ever received the BCG vaccine?: no  Has the patient been in recent contact with anyone known or suspected of having active TB disease?: no       Date of exposure (if applicable): na       Name of person they were exposed to (if applicable): na  Patient's Country of origin?: [place]  O: Alert and oriented in NAD. P:  PPD placed on 08/11/2020. Patient advised to return for reading within 48-72 hours.

## 2020-08-14 NOTE — TELEPHONE ENCOUNTER
Pt. Is asking if hepatitis titer was checked because that was one of the things that needed to be done. Please advise.

## 2020-08-28 DIAGNOSIS — I10 ESSENTIAL HYPERTENSION WITH GOAL BLOOD PRESSURE LESS THAN 130/85: ICD-10-CM

## 2020-08-30 RX ORDER — TELMISARTAN 20 MG/1
TABLET ORAL
Qty: 90 TAB | Refills: 3 | Status: SHIPPED | OUTPATIENT
Start: 2020-08-30 | End: 2020-09-02

## 2020-09-01 ENCOUNTER — CLINICAL SUPPORT (OUTPATIENT)
Dept: FAMILY MEDICINE CLINIC | Age: 35
End: 2020-09-01

## 2020-09-01 VITALS
HEART RATE: 86 BPM | HEIGHT: 68 IN | DIASTOLIC BLOOD PRESSURE: 96 MMHG | RESPIRATION RATE: 16 BRPM | TEMPERATURE: 97.6 F | OXYGEN SATURATION: 98 % | SYSTOLIC BLOOD PRESSURE: 160 MMHG | WEIGHT: 293 LBS | BODY MASS INDEX: 44.41 KG/M2

## 2020-09-01 DIAGNOSIS — Z23 ENCOUNTER FOR IMMUNIZATION: Primary | ICD-10-CM

## 2020-09-01 NOTE — PROGRESS NOTES
Briana Redmond presents today for   Chief Complaint   Patient presents with    Immunization/Injection       Is someone accompanying this pt? no    Is the patient using any DME equipment during OV? no    Depression Screening:  3 most recent PHQ Screens 9/1/2020   Little interest or pleasure in doing things Not at all   Feeling down, depressed, irritable, or hopeless Not at all   Total Score PHQ 2 0       Learning Assessment:  Learning Assessment 5/21/2019   PRIMARY LEARNER Patient   HIGHEST LEVEL OF EDUCATION - PRIMARY LEARNER  4 YEARS OF COLLEGE   BARRIERS PRIMARY LEARNER NONE   CO-LEARNER CAREGIVER No   PRIMARY LANGUAGE ENGLISH   LEARNER PREFERENCE PRIMARY DEMONSTRATION   ANSWERED BY patient   RELATIONSHIP SELF       Abuse Screening:  Abuse Screening Questionnaire 7/29/2020   Do you ever feel afraid of your partner? N   Are you in a relationship with someone who physically or mentally threatens you? N   Is it safe for you to go home? Y       Fall Risk  Fall Risk Assessment, last 12 mths 7/29/2020   Able to walk? Yes   Fall in past 12 months? No       Health Maintenance reviewed and discussed and ordered per Provider. Health Maintenance Due   Topic Date Due    A1C test (Diabetic or Prediabetic)  06/25/2020    Flu Vaccine (1) 09/01/2020   . Coordination of Care:  1. Have you been to the ER, urgent care clinic since your last visit? Hospitalized since your last visit? no    2. Have you seen or consulted any other health care providers outside of the [address] [place] [address] since your last visit? Include any pap smears or colon screening. no      Last  Checked na  Last UDS Checked na  Last Pain contract signed: na    Patient presents in office today for routine care. Patient concerns: Hep B injection and paperwork.

## 2020-09-02 DIAGNOSIS — I10 ESSENTIAL HYPERTENSION WITH GOAL BLOOD PRESSURE LESS THAN 130/85: ICD-10-CM

## 2020-09-02 RX ORDER — TELMISARTAN 20 MG/1
TABLET ORAL
Qty: 90 TAB | Refills: 3 | Status: SHIPPED | OUTPATIENT
Start: 2020-09-02 | End: 2020-09-03 | Stop reason: SDUPTHER

## 2020-09-03 DIAGNOSIS — I10 ESSENTIAL HYPERTENSION WITH GOAL BLOOD PRESSURE LESS THAN 130/85: ICD-10-CM

## 2020-09-03 RX ORDER — TELMISARTAN 20 MG/1
TABLET ORAL
Qty: 90 TAB | Refills: 3 | Status: SHIPPED | OUTPATIENT
Start: 2020-09-03 | End: 2020-09-04

## 2020-09-10 PROBLEM — Z11.1 ENCOUNTER FOR PPD TEST: Status: RESOLVED | Noted: 2020-08-11 | Resolved: 2020-09-10

## 2021-01-08 ENCOUNTER — VIRTUAL VISIT (OUTPATIENT)
Dept: FAMILY MEDICINE CLINIC | Age: 36
End: 2021-01-08

## 2021-01-08 DIAGNOSIS — Z86.16 PERSONAL HISTORY OF COVID-19: ICD-10-CM

## 2021-01-08 DIAGNOSIS — U07.1 COVID-19: Primary | ICD-10-CM

## 2021-01-08 DIAGNOSIS — R06.09 DYSPNEA ON EXERTION: ICD-10-CM

## 2021-01-08 DIAGNOSIS — I10 ESSENTIAL HYPERTENSION WITH GOAL BLOOD PRESSURE LESS THAN 130/85: ICD-10-CM

## 2021-01-08 PROCEDURE — 99441 PR PHYS/QHP TELEPHONE EVALUATION 5-10 MIN: CPT | Performed by: FAMILY MEDICINE

## 2021-01-08 RX ORDER — ALBUTEROL SULFATE 90 UG/1
2 AEROSOL, METERED RESPIRATORY (INHALATION)
Qty: 1 INHALER | Refills: 1
Start: 2021-01-08

## 2021-01-08 NOTE — PROGRESS NOTES
Jayy White presents today for   Chief Complaint   Patient presents with    Positive For Covid-19       Is someone accompanying this pt? na    Is the patient using any DME equipment during OV? na    Depression Screening:  3 most recent PHQ Screens 9/1/2020   Little interest or pleasure in doing things Not at all   Feeling down, depressed, irritable, or hopeless Not at all   Total Score PHQ 2 0       Learning Assessment:  Learning Assessment 5/21/2019   PRIMARY LEARNER Patient   HIGHEST LEVEL OF EDUCATION - PRIMARY LEARNER  4 YEARS OF COLLEGE   BARRIERS PRIMARY LEARNER NONE   CO-LEARNER CAREGIVER No   PRIMARY LANGUAGE ENGLISH   LEARNER PREFERENCE PRIMARY DEMONSTRATION   ANSWERED BY patient   RELATIONSHIP SELF       Abuse Screening:  Abuse Screening Questionnaire 7/29/2020   Do you ever feel afraid of your partner? N   Are you in a relationship with someone who physically or mentally threatens you? N   Is it safe for you to go home? Y       Fall Risk  Fall Risk Assessment, last 12 mths 7/29/2020   Able to walk? Yes   Fall in past 12 months? No       Health Maintenance reviewed and discussed and ordered per Provider. Health Maintenance Due   Topic Date Due    A1C test (Diabetic or Prediabetic)  06/25/2020    Flu Vaccine (1) 09/01/2020   . Coordination of Care:  1. Have you been to the ER, urgent care clinic since your last visit? Hospitalized since your last visit? Yes, 12/21/20, Eliseo Wooten, positive covid    2. Have you seen or consulted any other health care providers outside of the [address] [place] [address] since your last visit? Include any pap smears or colon screening.  no      Last  Checked na  Last UDS Checked na  Last Pain contract signed: na    Patients concerns today:  Positive covid and med refills

## 2021-01-08 NOTE — LETTER
NOTIFICATION RETURN TO WORK 
 
1/8/2021 Ms. Efrain Gomez 
[address] [phone] To Whom It May Concern: 
 
Efrain Gomez was tested for COVID-19 on 1/8, and the result was negative. She may return to work on 1/18. I recommend: return without restrictions If there are questions or concerns, please have the patient contact our office. Sincerely, Elder Portillo MD

## 2021-01-08 NOTE — PROGRESS NOTES
Jennifer Cortés is a 37 y.o.  female and presents with    Chief Complaint   Patient presents with    Positive For [address], who was evaluated through a synchronous (real-time) audio-video encounter, and/or her healthcare decision maker, is aware that it is a billable service, with coverage as determined by her insurance carrier. She provided verbal consent to proceed: Yes, and patient identification was verified. It was conducted pursuant to the emergency declaration under the [facility], [address] [place] [address] authority and the [employer] Act. A caregiver was present when appropriate. Ability to conduct physical exam was limited. I was in the office. The patient was at home. Duration of call 5-10 minutes  12/21/2020 admitted to [address] [place] [address] for [facility]  12/26/2020 discharged to home    Subjective:  She is following up for hospitalization for covid. She has been giving herself lovenox injections and has 2 doses remaining. She has had headache for 3-4 days and then had a fever of 102.5. She then went to get tested for COVID. She has had multiple contacts with covid; her  and son have covid. She reports that she feels drained of energy; when she walks down the stairs she needs her albuterol. Cardiovascular Review:  The patient has hypertension and obesity. Diet and Lifestyle: generally follows a low fat low cholesterol diet, generally follows a low sodium diet, does not rigorously follow a diabetic diet, sedentary  Home BP Monitoring: is not measured at home. Pertinent ROS: taking medications as instructed, no medication side effects noted, no TIA's, no chest pain on exertion, notes new dyspnea on exertion after covid, no swelling of ankles.      ROS   General ROS: negative for - chills, fever; + weight gain; + fatigue  Psychological ROS: negative for - anxiety or depression  Ophthalmic ROS: negative for - blurry vision  ENT ROS: positive for - headaches, negative for nasal congestion or sore throat  Endocrine ROS: negative for - polydipsia/polyuria or temperature intolerance  Respiratory ROS: + cough, shortness of breath, or wheezing  Cardiovascular ROS: no chest pain or dyspnea on exertion  Genito-Urinary ROS: no dysuria, trouble voiding, or hematuria  Musculoskeletal ROS: negative for - joint pain or muscle pain  Neurological ROS: negative for - numbness/tingling or weakness  Dermatological ROS: negative for - rash or skin lesion changes    All other systems reviewed and are negative. Objective: There were no vitals filed for this visit. Phone call only    LABS     TESTS      Assessment/Plan:    1. COVID-19  Quarantine; avoid work    2. Personal history of covid-19      3. Dyspnea on exertion  Inhaler prescribed  - albuterol (PROVENTIL HFA, VENTOLIN HFA, PROAIR HFA) 90 mcg/actuation inhaler; Take 2 Puffs by inhalation every six (6) hours as needed for Wheezing or Shortness of Breath. Dispense: 1 Inhaler; Refill: 1    4. Essential hypertension with goal blood pressure less than 130/85  Continue treatment      Lab review: COVID positive      I have discussed the diagnosis with the patient and the intended plan as seen in the above orders. I have discussed medication side effects and warnings with the patient as well. I have reviewed the plan of care with the patient, accepted their input and they are in agreement with the treatment goals.

## 2021-07-23 ENCOUNTER — TELEPHONE (OUTPATIENT)
Dept: FAMILY MEDICINE CLINIC | Age: 36
End: 2021-07-23

## 2021-07-23 NOTE — TELEPHONE ENCOUNTER
Returned call to talk to the mother but she was not home. I talked to the , he said he would fine out what was going on and call me back.

## 2021-07-23 NOTE — TELEPHONE ENCOUNTER
Pt. mother called in advising that pt. bp was high yesterday and was requesting an appointment today, advised to take pt. o urgent care. Pt. mother states pt. needs to change PCP because she can never see Dr. Rome Carroll. She states her daughter went to the ER yesterday but they did not do anything but an EKG and xray and sent her home. Pt. mother is now requesting an appointment from her daughter with any provider in the meantime.  Please assist.

## 2021-07-27 ENCOUNTER — OFFICE VISIT (OUTPATIENT)
Dept: FAMILY MEDICINE CLINIC | Age: 36
End: 2021-07-27
Payer: COMMERCIAL

## 2021-07-27 VITALS
BODY MASS INDEX: 44.41 KG/M2 | WEIGHT: 293 LBS | SYSTOLIC BLOOD PRESSURE: 182 MMHG | TEMPERATURE: 97.7 F | HEART RATE: 84 BPM | OXYGEN SATURATION: 100 % | DIASTOLIC BLOOD PRESSURE: 95 MMHG | RESPIRATION RATE: 16 BRPM | HEIGHT: 68 IN

## 2021-07-27 DIAGNOSIS — Z00.00 ANNUAL PHYSICAL EXAM: Primary | ICD-10-CM

## 2021-07-27 DIAGNOSIS — R73.01 IMPAIRED FASTING GLUCOSE: ICD-10-CM

## 2021-07-27 DIAGNOSIS — E66.01 MORBID OBESITY WITH BMI OF 45.0-49.9, ADULT (HCC): ICD-10-CM

## 2021-07-27 DIAGNOSIS — I10 ESSENTIAL HYPERTENSION WITH GOAL BLOOD PRESSURE LESS THAN 130/85: ICD-10-CM

## 2021-07-27 PROCEDURE — 99395 PREV VISIT EST AGE 18-39: CPT | Performed by: FAMILY MEDICINE

## 2021-07-27 RX ORDER — TELMISARTAN 40 MG/1
TABLET ORAL
Qty: 90 TABLET | Refills: 3 | Status: SHIPPED | OUTPATIENT
Start: 2021-07-27 | End: 2022-05-17 | Stop reason: SDUPTHER

## 2021-07-27 RX ORDER — SEMAGLUTIDE 1.34 MG/ML
1 INJECTION, SOLUTION SUBCUTANEOUS
Qty: 4 PEN | Refills: 1 | Status: SHIPPED | OUTPATIENT
Start: 2021-07-27 | End: 2021-08-26 | Stop reason: CLARIF

## 2021-07-27 NOTE — PROGRESS NOTES
Temple Goldmann is a 37 y.o.  female and presents with    Chief Complaint   Patient presents with    Hypertension    Complete Physical     Subjective: Well Adult Physical   Patient here for a comprehensive physical exam.The patient reports problems - hypertension and obesity  Do you take any herbs or supplements that were not prescribed by a doctor? no Are you taking calcium supplements? no Are you taking aspirin daily? not applicable    Cardiovascular Review:  The patient has hypertension and obesity. Diet and Lifestyle: generally follows a low fat low cholesterol diet, generally follows a low sodium diet, does not rigorously follow a diabetic diet, sedentary  Home BP Monitoring: is not measured at home. Pertinent ROS: taking medications as instructed, no medication side effects noted, no TIA's, no chest pain on exertion, notes new dyspnea on exertion after covid, no swelling of ankles.      ROS   General ROS: negative for - chills, fever; + weight gain; no fatigue  Psychological ROS: negative for - anxiety or depression  Ophthalmic ROS: negative for - blurry vision  ENT ROS: positive for - headaches, negative for nasal congestion or sore throat  Endocrine ROS: negative for - polydipsia/polyuria or temperature intolerance  Respiratory ROS: no cough, shortness of breath, or wheezing  Cardiovascular ROS: no chest pain or dyspnea on exertion  Genito-Urinary ROS: no dysuria, trouble voiding, or hematuria  Musculoskeletal ROS: negative for - joint pain or muscle pain  Neurological ROS: negative for - numbness/tingling or weakness  Dermatological ROS: negative for - rash or skin lesion changes     All other systems reviewed and are negative.     Objective:  Vitals:    07/27/21 1550   BP: (!) 182/95   Pulse: 84   Resp: 16   Temp: 97.7 °F (36.5 °C)   TempSrc: Temporal   SpO2: 100%   Weight: 319 lb (144.7 kg)   Height: 5' 8\" (1.727 m)   PainSc:   0 - No pain     BMI 48.50 kg/m²       General appearance  alert, cooperative, no distress, appears stated age   Head  Normocephalic, without obvious abnormality, atraumatic   Eyes  conjunctivae/corneas clear. PERRL, EOM's intact. Ears  normal TM's and external ear canals AU   Nose Nares normal. Septum midline. Mucosa normal. No drainage or sinus tenderness. Throat Lips, mucosa, and tongue normal. Teeth and gums normal   Neck supple, symmetrical, trachea midline, no adenopathy, thyroid: not enlarged, symmetric, no tenderness/mass/nodules   Back   symmetric, no curvature. ROM normal. No CVA tenderness   Lungs   clear to auscultation bilaterally   Breasts  Not examined   Heart  regular rate and rhythm, S1, S2 normal, no murmur, click, rub or gallop   Abdomen   soft, non-tender. Bowel sounds normal. No masses,  No organomegaly   Pelvic Deferred   Extremities extremities normal, atraumatic, no cyanosis or edema   Pulses 2+ and symmetric   Skin Skin color, texture, turgor normal. No rashes or lesions   Lymph nodes Cervical, supraclavicular, and axillary nodes normal.   Neurologic Normal     LABS     TESTS      Assessment/Plan:    1. Annual physical exam  Reviewed preventive recommendations    2. Essential hypertension with goal blood pressure less than 130/85  Dose increased for better control of hypertension  - telmisartan (MICARDIS) 40 mg tablet; TAKE 1 TABLET BY MOUTH DAILY  Dispense: 90 Tablet; Refill: 3    3. Morbid obesity with BMI of 45.0-49.9, adult ([facility] [address]  I have reviewed/discussed the above normal BMI with the patient. I have recommended the following interventions: dietary management education, guidance, and counseling, encourage exercise and monitor weight . Mirtha Campo She has become frustrated with working to lose weight with diets and exercise and no results; she would like to discuss surgical options  - REFERRAL TO BARIATRIC SURGERY    4.  Impaired fasting glucose  Start treatment for glucose control and weight loss  - semaglutide (Ozempic) 1 mg/dose (2 mg/1.5 mL) sub-q pen; 1 mg by SubCUTAneous route every seven (7) days. Dispense: 4 Pen; Refill: 1      Lab review: orders written for new lab studies as appropriate; see orders      I have discussed the diagnosis with the patient and the intended plan as seen in the above orders. The patient has received an after-visit summary and questions were answered concerning future plans. I have discussed medication side effects and warnings with the patient as well. I have reviewed the plan of care with the patient, accepted their input and they are in agreement with the treatment goals.

## 2021-07-27 NOTE — PROGRESS NOTES
Zay Alvarado presents today for   Chief Complaint   Patient presents with    Hypertension       Is someone accompanying this pt? no    Is the patient using any DME equipment during OV? no    Depression Screening:  3 most recent PHQ Screens 7/27/2021   Little interest or pleasure in doing things Not at all   Feeling down, depressed, irritable, or hopeless Not at all   Total Score PHQ 2 0       Learning Assessment:  Learning Assessment 5/21/2019   PRIMARY LEARNER Patient   HIGHEST LEVEL OF EDUCATION - PRIMARY LEARNER  4 YEARS OF COLLEGE   BARRIERS PRIMARY LEARNER NONE   CO-LEARNER CAREGIVER No   PRIMARY LANGUAGE ENGLISH   LEARNER PREFERENCE PRIMARY DEMONSTRATION   ANSWERED BY patient   RELATIONSHIP SELF       Abuse Screening:  Abuse Screening Questionnaire 1/8/2021   Do you ever feel afraid of your partner? N   Are you in a relationship with someone who physically or mentally threatens you? N   Is it safe for you to go home? Y       Fall Risk  Fall Risk Assessment, last 12 mths 7/29/2020   Able to walk? Yes   Fall in past 12 months? No       Health Maintenance reviewed and discussed and ordered per Provider. Health Maintenance Due   Topic Date Due    Hepatitis C Screening  Never done    COVID-19 Vaccine (1) Never done   . Coordination of Care:  1. Have you been to the ER, urgent care clinic since your last visit? Hospitalized since your last visit? Yes, 7/22/21, [facility], chest pain     2. Have you seen or consulted any other health care providers outside of the [address] [place] [address] since your last visit? Include any pap smears or colon screening. no      Last  Checked na  Last UDS Checked na  Last Pain contract signed: na    Patient presents in office today for routine care.   Patient concerns: med eval for HTN

## 2021-08-26 ENCOUNTER — OFFICE VISIT (OUTPATIENT)
Dept: FAMILY MEDICINE CLINIC | Age: 36
End: 2021-08-26
Payer: COMMERCIAL

## 2021-08-26 VITALS
HEIGHT: 68 IN | OXYGEN SATURATION: 99 % | WEIGHT: 293 LBS | BODY MASS INDEX: 44.41 KG/M2 | SYSTOLIC BLOOD PRESSURE: 138 MMHG | DIASTOLIC BLOOD PRESSURE: 88 MMHG | HEART RATE: 85 BPM | TEMPERATURE: 98.3 F

## 2021-08-26 DIAGNOSIS — N92.0 MENORRHAGIA WITH REGULAR CYCLE: ICD-10-CM

## 2021-08-26 DIAGNOSIS — L30.4 INTERTRIGO: ICD-10-CM

## 2021-08-26 DIAGNOSIS — R07.89 ATYPICAL CHEST PAIN: ICD-10-CM

## 2021-08-26 DIAGNOSIS — R73.01 IMPAIRED FASTING GLUCOSE: ICD-10-CM

## 2021-08-26 DIAGNOSIS — I10 ESSENTIAL HYPERTENSION WITH GOAL BLOOD PRESSURE LESS THAN 130/85: Primary | ICD-10-CM

## 2021-08-26 DIAGNOSIS — Z86.16 PERSONAL HISTORY OF COVID-19: ICD-10-CM

## 2021-08-26 DIAGNOSIS — E66.01 MORBID OBESITY WITH BMI OF 45.0-49.9, ADULT (HCC): ICD-10-CM

## 2021-08-26 PROCEDURE — 99214 OFFICE O/P EST MOD 30 MIN: CPT | Performed by: FAMILY MEDICINE

## 2021-08-26 RX ORDER — FLUCONAZOLE 150 MG/1
150 TABLET ORAL
Qty: 4 TABLET | Refills: 0 | Status: SHIPPED | OUTPATIENT
Start: 2021-08-26 | End: 2021-10-27 | Stop reason: SDUPTHER

## 2021-08-26 RX ORDER — ETONOGESTREL AND ETHINYL ESTRADIOL 11.7; 2.7 MG/1; MG/1
1 INSERT, EXTENDED RELEASE VAGINAL
Qty: 3 DEVICE | Refills: 1 | Status: SHIPPED | OUTPATIENT
Start: 2021-08-26 | End: 2022-09-19 | Stop reason: SINTOL

## 2021-08-26 NOTE — PROGRESS NOTES
Alexander Soto presents today for No chief complaint on file. Is someone accompanying this pt? no    Is the patient using any DME equipment during [address]? no    Depression Screening:  3 most recent PHQ Screens 8/26/2021   Little interest or pleasure in doing things Not at all   Feeling down, depressed, irritable, or hopeless Not at all   Total Score PHQ 2 0       Learning Assessment:  Learning Assessment 5/21/2019   PRIMARY LEARNER Patient   HIGHEST LEVEL OF EDUCATION - PRIMARY LEARNER  4 YEARS OF COLLEGE   BARRIERS PRIMARY LEARNER NONE   CO-LEARNER CAREGIVER No   PRIMARY LANGUAGE ENGLISH   LEARNER PREFERENCE PRIMARY DEMONSTRATION   ANSWERED BY patient   RELATIONSHIP SELF       Abuse Screening:  Abuse Screening Questionnaire 1/8/2021   Do you ever feel afraid of your partner? N   Are you in a relationship with someone who physically or mentally threatens you? N   Is it safe for you to go home? Y       Fall Risk  Fall Risk Assessment, last 12 mths 7/29/2020   Able to walk? Yes   Fall in past 12 months? No       Health Maintenance reviewed and discussed and ordered per Provider. Health Maintenance Due   Topic Date Due    Hepatitis C Screening  Never done   . Coordination of Care:  1. Have you been to the ER, urgent care clinic since your last visit? Hospitalized since your last visit? no    2. Have you seen or consulted any other health care providers outside of the [address] [place] [address] since your last visit? Include any pap smears or colon screening. no      Last  Checked na  Last UDS Checked na  Last Pain contract signed: na    Patient presents in office today for routine care.   Patient concerns: med refills

## 2021-08-26 NOTE — PROGRESS NOTES
Pat Nurse is a 37 y.o.  female and presents with    Chief Complaint   Patient presents with    Hypertension     Subjective:  Hypertension  Patient is here for follow-up of hypertension. She is also following up after ER visit 1 month ago for chest pain; she continues to have chest pain; it is on the left side and dull; she denies nausea or shortness of breath. It occurs at rest or with activity. She indicates that she is feeling well and denies any symptoms referable to her hypertension. She is exercising and is adherent to low salt diet. Blood pressure is not well controlled at home. Use of agents associated with hypertension: none. Cardiovascular Review:  The patient has hypertension and obesity. Diet and Lifestyle: generally follows a low fat low cholesterol diet, generally follows a low sodium diet, does not rigorously follow a diabetic diet, sedentary  Home BP Monitoring: is not measured at home. Pertinent ROS: taking medications as instructed, no medication side effects noted, no TIA's, no chest pain on exertion, notes new dyspnea on exertion after covid, no swelling of ankles.      ROS   General ROS: negative for - chills, fever; + weight gain; no fatigue  Psychological ROS: negative for - anxiety or depression  Ophthalmic ROS: negative for - blurry vision  ENT ROS: positive for - headaches, negative for nasal congestion or sore throat  Endocrine ROS: negative for - polydipsia/polyuria or temperature intolerance  Respiratory ROS: no cough, shortness of breath, or wheezing  Cardiovascular ROS: no chest pain or dyspnea on exertion  Genito-Urinary ROS: no dysuria, trouble voiding, or hematuria  Musculoskeletal ROS: negative for - joint pain or muscle pain  Neurological ROS: negative for - numbness/tingling or weakness  Dermatological ROS: negative for - rash or skin lesion changes     All other systems reviewed and are negative.       Objective:  Vitals:    08/26/21 1600 08/26/21 1642   BP: (!) 158/97 138/88   Pulse: 85    Temp: 98.3 °F (36.8 °C)    TempSrc: Skin    SpO2: 99%    Weight: 314 lb 6.4 oz (142.6 kg)    Height: 5' 8\" (1.727 m)    PainSc:   0 - No pain        alert, well appearing, and in no distress, oriented to person, place, and time and morbidly obese  Mental status - normal mood, behavior, speech, dress, motor activity, and thought processes  Chest - clear to auscultation, no wheezes, rales or rhonchi, symmetric air entry  Heart - normal rate, regular rhythm, normal S1, S2, no murmurs, rubs, clicks or gallops  Neurological - cranial nerves II through XII intact    LABS     TESTS      Assessment/Plan:    1. Essential hypertension with goal blood pressure less than 130/85  Assess renal function  - METABOLIC PANEL, COMPREHENSIVE; Future    2. Atypical chest pain  Assess TSH  - TSH 3RD GENERATION; Future    3. Impaired fasting glucose  Start treatment for glucose control and weight management  - semaglutide (Rybelsus) 7 mg tablet; Take 1 Tablet by mouth Daily (before breakfast). Dispense: 30 Tablet; Refill: 5    4. Morbid obesity with BMI of 45.0-49.9, adult ([facility] [address]  I have reviewed/discussed the above normal BMI with the patient. I have recommended the following interventions: dietary management education, guidance, and counseling and encourage exercise . refer for weight loss surgery      - semaglutide (Rybelsus) 7 mg tablet; Take 1 Tablet by mouth Daily (before breakfast). Dispense: 30 Tablet; Refill: 5  - CBC WITH AUTOMATED DIFF; Future    5. Personal history of COVID-19      6. Intertrigo  Antifungal prescribed oral due to refractive symptoms  - fluconazole (DIFLUCAN) 150 mg tablet; Take 1 Tablet by mouth every seven (7) days. FDA advises cautious prescribing of oral fluconazole in pregnancy. Dispense: 4 Tablet; Refill: 0    7.  Menorrhagia with regular cycle    - ethinyl estradiol-etonogestrel (NuvaRing) 0.12-0.015 mg/24 hr vaginal ring; 1 Each by Intravaginal route every twenty-eight (28) days. Dispense: 3 Device; Refill: 1    Lab review: orders written for new lab studies as appropriate; see orders      I have discussed the diagnosis with the patient and the intended plan as seen in the above orders. The patient has received an after-visit summary and questions were answered concerning future plans. I have discussed medication side effects and warnings with the patient as well. I have reviewed the plan of care with the patient, accepted their input and they are in agreement with the treatment goals.

## 2021-09-01 LAB
ALBUMIN SERPL-MCNC: 4.3 G/DL (ref 3.8–4.8)
ALBUMIN/GLOB SERPL: 1.4 {RATIO} (ref 1.2–2.2)
ALP SERPL-CCNC: 75 IU/L (ref 48–121)
ALT SERPL-CCNC: 15 IU/L (ref 0–32)
AST SERPL-CCNC: 17 IU/L (ref 0–40)
BASOPHILS # BLD AUTO: 0 X10E3/UL (ref 0–0.2)
BASOPHILS NFR BLD AUTO: 0 %
BILIRUB SERPL-MCNC: <0.2 MG/DL (ref 0–1.2)
BUN SERPL-MCNC: 11 MG/DL (ref 6–20)
BUN/CREAT SERPL: 13 (ref 9–23)
CALCIUM SERPL-MCNC: 9.7 MG/DL (ref 8.7–10.2)
CHLORIDE SERPL-SCNC: 105 MMOL/L (ref 96–106)
CO2 SERPL-SCNC: 21 MMOL/L (ref 20–29)
CREAT SERPL-MCNC: 0.88 MG/DL (ref 0.57–1)
EOSINOPHIL # BLD AUTO: 0.1 X10E3/UL (ref 0–0.4)
EOSINOPHIL NFR BLD AUTO: 2 %
ERYTHROCYTE [DISTWIDTH] IN BLOOD BY AUTOMATED COUNT: 14.4 % (ref 11.7–15.4)
GLOBULIN SER CALC-MCNC: 3.1 G/DL (ref 1.5–4.5)
GLUCOSE SERPL-MCNC: 86 MG/DL (ref 65–99)
HCT VFR BLD AUTO: 39.8 % (ref 34–46.6)
HGB BLD-MCNC: 12 G/DL (ref 11.1–15.9)
IMM GRANULOCYTES # BLD AUTO: 0 X10E3/UL (ref 0–0.1)
IMM GRANULOCYTES NFR BLD AUTO: 0 %
LYMPHOCYTES # BLD AUTO: 2.1 X10E3/UL (ref 0.7–3.1)
LYMPHOCYTES NFR BLD AUTO: 31 %
MCH RBC QN AUTO: 21.5 PG (ref 26.6–33)
MCHC RBC AUTO-ENTMCNC: 30.2 G/DL (ref 31.5–35.7)
MCV RBC AUTO: 72 FL (ref 79–97)
MONOCYTES # BLD AUTO: 0.7 X10E3/UL (ref 0.1–0.9)
MONOCYTES NFR BLD AUTO: 10 %
NEUTROPHILS # BLD AUTO: 3.9 X10E3/UL (ref 1.4–7)
NEUTROPHILS NFR BLD AUTO: 57 %
PLATELET # BLD AUTO: 427 X10E3/UL (ref 150–450)
POTASSIUM SERPL-SCNC: 4.2 MMOL/L (ref 3.5–5.2)
PROT SERPL-MCNC: 7.4 G/DL (ref 6–8.5)
RBC # BLD AUTO: 5.57 X10E6/UL (ref 3.77–5.28)
SODIUM SERPL-SCNC: 140 MMOL/L (ref 134–144)
TSH SERPL DL<=0.005 MIU/L-ACNC: 1.51 UIU/ML (ref 0.45–4.5)
WBC # BLD AUTO: 6.8 X10E3/UL (ref 3.4–10.8)

## 2021-09-27 ENCOUNTER — OFFICE VISIT (OUTPATIENT)
Dept: FAMILY MEDICINE CLINIC | Age: 36
End: 2021-09-27
Payer: COMMERCIAL

## 2021-09-27 VITALS
OXYGEN SATURATION: 98 % | TEMPERATURE: 97.9 F | SYSTOLIC BLOOD PRESSURE: 138 MMHG | RESPIRATION RATE: 16 BRPM | BODY MASS INDEX: 44.41 KG/M2 | HEART RATE: 73 BPM | HEIGHT: 68 IN | DIASTOLIC BLOOD PRESSURE: 84 MMHG | WEIGHT: 293 LBS

## 2021-09-27 DIAGNOSIS — I10 ESSENTIAL HYPERTENSION WITH GOAL BLOOD PRESSURE LESS THAN 130/85: Primary | ICD-10-CM

## 2021-09-27 DIAGNOSIS — K04.7 TOOTH INFECTION: ICD-10-CM

## 2021-09-27 PROCEDURE — 99213 OFFICE O/P EST LOW 20 MIN: CPT | Performed by: FAMILY MEDICINE

## 2021-09-27 RX ORDER — ACETAMINOPHEN AND CODEINE PHOSPHATE 300; 30 MG/1; MG/1
1 TABLET ORAL
Qty: 12 TABLET | Refills: 0 | Status: SHIPPED | OUTPATIENT
Start: 2021-09-27 | End: 2021-10-04

## 2021-09-27 NOTE — PROGRESS NOTES
Nora Dietrich presents today for   Chief Complaint   Patient presents with    Dental Pain    Hypertension       Is someone accompanying this pt? no    Is the patient using any DME equipment during OV? no    Depression Screening:  3 most recent PHQ Screens 9/27/2021   Little interest or pleasure in doing things Not at all   Feeling down, depressed, irritable, or hopeless Not at all   Total Score PHQ 2 0       Learning Assessment:  Learning Assessment 5/21/2019   PRIMARY LEARNER Patient   HIGHEST LEVEL OF EDUCATION - PRIMARY LEARNER  4 YEARS OF COLLEGE   BARRIERS PRIMARY LEARNER NONE   CO-LEARNER CAREGIVER No   PRIMARY LANGUAGE ENGLISH   LEARNER PREFERENCE PRIMARY DEMONSTRATION   ANSWERED BY patient   RELATIONSHIP SELF       Abuse Screening:  Abuse Screening Questionnaire 1/8/2021   Do you ever feel afraid of your partner? N   Are you in a relationship with someone who physically or mentally threatens you? N   Is it safe for you to go home? Y       Fall Risk  Fall Risk Assessment, last 12 mths 7/29/2020   Able to walk? Yes   Fall in past 12 months? No       Health Maintenance reviewed and discussed and ordered per Provider. Health Maintenance Due   Topic Date Due    Hepatitis C Screening  Never done    Cervical cancer screen  06/26/2019    Flu Vaccine (1) Never done   . Coordination of Care:  1. Have you been to the ER, urgent care clinic since your last visit? Hospitalized since your last visit? no    2. Have you seen or consulted any other health care providers outside of the [address] [place] [address] since your last visit? Include any pap smears or colon screening. no      Last  Checked na  Last UDS Checked na  Last Pain contract signed: na    Patient presents in office today for routine care.   Patient concerns: toothache, and b/p check

## 2021-09-27 NOTE — PROGRESS NOTES
Mallory La is a 31 y.o.  female and presents with    Chief Complaint   Patient presents with    Dental Pain    Hypertension     Subjective:  She presents for f/u hypertension. She has tooth pain with infection and was diagnosed today and prescribed abx by dentist.  Pain radiates up into right ear. Cardiovascular Review:  The patient has hypertension and obesity. Diet and Lifestyle: generally follows a low fat low cholesterol diet, generally follows a low sodium diet, follows a diabetic diet regularly, exercises regularly, nonsmoker  Home BP Monitoring: is not measured at home. Pertinent ROS: taking medications as instructed, no medication side effects noted, no TIA's, no chest pain on exertion, no dyspnea on exertion, no swelling of ankles.      ROS   General ROS: negative for - chills, fever; + weight gain; no fatigue  Psychological ROS: negative for - anxiety or depression  Ophthalmic ROS: negative for - blurry vision  ENT ROS: positive for - headaches, negative for nasal congestion or sore throat  Endocrine ROS: negative for - polydipsia/polyuria or temperature intolerance  Respiratory ROS: no cough, shortness of breath, or wheezing  Cardiovascular ROS: no chest pain or dyspnea on exertion  Genito-Urinary ROS: no dysuria, trouble voiding, or hematuria  Musculoskeletal ROS: negative for - joint pain or muscle pain  Neurological ROS: negative for - numbness/tingling or weakness  Dermatological ROS: negative for - rash or skin lesion changes     All other systems reviewed and are negative.     Objective:  Vitals:    09/27/21 1613 09/27/21 1625   BP: (!) 162/82 138/84   Pulse: 73    Resp: 16    Temp: 97.9 °F (36.6 °C)    TempSrc: Temporal    SpO2: 98%    Weight: 322 lb (146.1 kg)    Height: 5' 8\" (1.727 m)    PainSc:  10 - Worst pain ever    PainLoc: Mouth        alert, well appearing, and in no distress, oriented to person, place, and time and morbidly obese  Mental status - normal mood, behavior, speech, dress, motor activity, and thought processes  Chest - clear to auscultation, no wheezes, rales or rhonchi, symmetric air entry  Heart - normal rate, regular rhythm, normal S1, S2, no murmurs, rubs, clicks or gallops  Neurological - cranial nerves II through XII intact    LABS     TESTS      Assessment/Plan:    1. Essential hypertension with goal blood pressure less than 130/85  Improved; continue treatment    2. Tooth infection  Start pain management  - acetaminophen-codeine (Tylenol-Codeine #3) 300-30 mg per tablet; Take 1 Tablet by mouth every six (6) hours as needed for Pain for up to 7 days. Max Daily Amount: 4 Tablets. Dispense: 12 Tablet; Refill: 0      Lab review: no lab studies available for review at time of visit      I have discussed the diagnosis with the patient and the intended plan as seen in the above orders. The patient has received an after-visit summary and questions were answered concerning future plans. I have discussed medication side effects and warnings with the patient as well. I have reviewed the plan of care with the patient, accepted their input and they are in agreement with the treatment goals. Chonodrocutaneous Helical Advancement Flap Text: The defect edges were debeveled with a #15 scalpel blade.  Given the location of the defect and the proximity to free margins a chondrocutaneous helical advancement flap was deemed most appropriate.  Using a sterile surgical marker, the appropriate advancement flap was drawn incorporating the defect and placing the expected incisions within the relaxed skin tension lines where possible.    The area thus outlined was incised deep to adipose tissue with a #15 scalpel blade.  The skin margins were undermined to an appropriate distance in all directions utilizing iris scissors.

## 2021-10-27 ENCOUNTER — OFFICE VISIT (OUTPATIENT)
Dept: FAMILY MEDICINE CLINIC | Age: 36
End: 2021-10-27
Payer: COMMERCIAL

## 2021-10-27 VITALS
TEMPERATURE: 98 F | HEIGHT: 68 IN | OXYGEN SATURATION: 99 % | WEIGHT: 293 LBS | DIASTOLIC BLOOD PRESSURE: 101 MMHG | BODY MASS INDEX: 44.41 KG/M2 | SYSTOLIC BLOOD PRESSURE: 169 MMHG | RESPIRATION RATE: 16 BRPM | HEART RATE: 74 BPM

## 2021-10-27 DIAGNOSIS — E66.01 MORBID OBESITY WITH BMI OF 45.0-49.9, ADULT (HCC): ICD-10-CM

## 2021-10-27 DIAGNOSIS — R73.01 IMPAIRED FASTING GLUCOSE: ICD-10-CM

## 2021-10-27 DIAGNOSIS — I10 ESSENTIAL HYPERTENSION WITH GOAL BLOOD PRESSURE LESS THAN 130/85: Primary | ICD-10-CM

## 2021-10-27 DIAGNOSIS — B37.31 CANDIDA VAGINITIS: ICD-10-CM

## 2021-10-27 DIAGNOSIS — L30.4 INTERTRIGO: ICD-10-CM

## 2021-10-27 PROCEDURE — 99214 OFFICE O/P EST MOD 30 MIN: CPT | Performed by: FAMILY MEDICINE

## 2021-10-27 RX ORDER — SPIRONOLACTONE 25 MG/1
25 TABLET ORAL DAILY
Qty: 30 TABLET | Refills: 2 | Status: SHIPPED | OUTPATIENT
Start: 2021-10-27 | End: 2022-05-17 | Stop reason: SDUPTHER

## 2021-10-27 RX ORDER — FLUCONAZOLE 150 MG/1
150 TABLET ORAL
Qty: 4 TABLET | Refills: 0 | Status: SHIPPED | OUTPATIENT
Start: 2021-10-27 | End: 2022-05-17 | Stop reason: SDUPTHER

## 2021-10-27 RX ORDER — LIRAGLUTIDE 6 MG/ML
0.6 INJECTION SUBCUTANEOUS DAILY
Qty: 3 EACH | Refills: 1 | Status: SHIPPED | OUTPATIENT
Start: 2021-10-27 | End: 2022-05-17

## 2021-10-27 NOTE — PROGRESS NOTES
Lynn Seaman presents today for   Chief Complaint   Patient presents with    Hypertension    Thyroid Problem    Weight Management       Is someone accompanying this pt? no    Is the patient using any DME equipment during OV? no    Depression Screening:  3 most recent PHQ Screens 10/27/2021   Little interest or pleasure in doing things Not at all   Feeling down, depressed, irritable, or hopeless Not at all   Total Score PHQ 2 0       Learning Assessment:  Learning Assessment 5/21/2019   PRIMARY LEARNER Patient   HIGHEST LEVEL OF EDUCATION - PRIMARY LEARNER  4 YEARS OF COLLEGE   BARRIERS PRIMARY LEARNER NONE   CO-LEARNER CAREGIVER No   PRIMARY LANGUAGE ENGLISH   LEARNER PREFERENCE PRIMARY DEMONSTRATION   ANSWERED BY patient   RELATIONSHIP SELF       Abuse Screening:  Abuse Screening Questionnaire 1/8/2021   Do you ever feel afraid of your partner? N   Are you in a relationship with someone who physically or mentally threatens you? N   Is it safe for you to go home? Y       Fall Risk  Fall Risk Assessment, last 12 mths 7/29/2020   Able to walk? Yes   Fall in past 12 months? No       Health Maintenance reviewed and discussed and ordered per Provider. Health Maintenance Due   Topic Date Due    Hepatitis C Screening  Never done   . Coordination of Care:  1. Have you been to the ER, urgent care clinic since your last visit? Hospitalized since your last visit? no    2. Have you seen or consulted any other health care providers outside of the [address] [place] [address] since your last visit? Include any pap smears or colon screening. no      Last  Checked na  Last UDS Checked na  Last Pain contract signed: na    Patient presents in office today for routine care.   Patient concerns: HBP, and weight management

## 2021-11-13 NOTE — PROGRESS NOTES
Layla Wilson is a 31 y.o.  female and presents with    Chief Complaint   Patient presents with    Hypertension    Thyroid Problem    Weight Management     Subjective:  Cardiovascular Review:  The patient has hypertension and obesity and . Diet and Lifestyle: generally follows a low fat low cholesterol diet, generally follows a low sodium diet, follows a diabetic diet regularly, exercises regularly, nonsmoker  Home BP Monitoring: is not measured at home. Pertinent ROS: taking medications as instructed, no medication side effects noted, no TIA's, no chest pain on exertion, no dyspnea on exertion, no swelling of ankles.      She c/o vaginal itching and burning. ROS   General ROS: negative for - chills, fever; + weight gain; no fatigue  Psychological ROS: negative for - anxiety or depression  Ophthalmic ROS: negative for - blurry vision  ENT ROS: positive for - headaches, negative for nasal congestion or sore throat  Endocrine ROS: negative for - polydipsia/polyuria or temperature intolerance  Respiratory ROS: no cough, shortness of breath, or wheezing  Cardiovascular ROS: no chest pain or dyspnea on exertion  Genito-Urinary ROS: no dysuria, trouble voiding, or hematuria  Musculoskeletal ROS: negative for - joint pain or muscle pain  Neurological ROS: negative for - numbness/tingling or weakness  Dermatological ROS: negative for - rash or skin lesion changes     All other systems reviewed and are negative.     Objective:  Vitals:    10/27/21 1601   BP: (!) 169/101   Pulse: 74   Resp: 16   Temp: 98 °F (36.7 °C)   TempSrc: Temporal   SpO2: 99%   Weight: 315 lb (142.9 kg)   Height: 5' 8\" (1.727 m)   PainSc:   0 - No pain       alert, well appearing, and in no distress, oriented to person, place, and time and morbidly obese  Mental status - normal mood, behavior, speech, dress, motor activity, and thought processes  Chest - clear to auscultation, no wheezes, rales or rhonchi, symmetric air entry  Heart - normal rate, regular rhythm, normal S1, S2, no murmurs, rubs, clicks or gallops  Neurological - cranial nerves II through XII intact    LABS     TESTS      Assessment/Plan:    1. Essential hypertension with goal blood pressure less than 130/85  Start diuretic for better control of hypertension  - spironolactone (ALDACTONE) 25 mg tablet; Take 1 Tablet by mouth daily. Dispense: 30 Tablet; Refill: 2    2. Morbid obesity with BMI of 45.0-49.9, [facility]  I have reviewed/discussed the above normal BMI with the patient. I have recommended the following interventions: dietary management education, guidance, and counseling and encourage exercise . Wes Best 3. Impaired fasting glucose  Start glp 1 for weight loss  - liraglutide (Victoza 3-Jeramy) 0.6 mg/0.1 mL (18 mg/3 mL) pnij; 0.6 mg by SubCUTAneous route daily. Dispense: 3 Each; Refill: 1    4. Intertrigo    - fluconazole (DIFLUCAN) 150 mg tablet; Take 1 Tablet by mouth every seven (7) days. FDA advises cautious prescribing of oral fluconazole in pregnancy. Dispense: 4 Tablet; Refill: 0    5. Candida vaginitis    - fluconazole (DIFLUCAN) 150 mg tablet; Take 1 Tablet by mouth every seven (7) days. FDA advises cautious prescribing of oral fluconazole in pregnancy. Dispense: 4 Tablet; Refill: 0      Lab review: no lab studies available for review at time of visit      I have discussed the diagnosis with the patient and the intended plan as seen in the above orders. The patient has received an after-visit summary and questions were answered concerning future plans. I have discussed medication side effects and warnings with the patient as well. I have reviewed the plan of care with the patient, accepted their input and they are in agreement with the treatment goals. Follow-up and Dispositions    · Return in about 1 month (around 11/27/2021) for medication evaluation.

## 2022-03-18 PROBLEM — O13.9 GESTATIONAL HYPERTENSION: Status: ACTIVE | Noted: 2020-01-06

## 2022-03-18 PROBLEM — Z34.90 PREGNANCY: Status: ACTIVE | Noted: 2020-01-13

## 2022-03-19 PROBLEM — O10.919 CHRONIC HYPERTENSION AFFECTING PREGNANCY: Status: ACTIVE | Noted: 2020-01-16

## 2022-03-19 PROBLEM — E66.01 OBESITY, MORBID (HCC): Status: ACTIVE | Noted: 2017-11-28

## 2022-03-20 PROBLEM — O16.9 HYPERTENSION DURING PREGNANCY: Status: ACTIVE | Noted: 2020-01-13

## 2022-03-20 PROBLEM — B95.1 GROUP B STREPTOCOCCAL INFECTION DURING PREGNANCY: Status: ACTIVE | Noted: 2020-01-24

## 2022-03-20 PROBLEM — O98.819 GROUP B STREPTOCOCCAL INFECTION DURING PREGNANCY: Status: ACTIVE | Noted: 2020-01-24

## 2022-05-17 ENCOUNTER — OFFICE VISIT (OUTPATIENT)
Dept: FAMILY MEDICINE CLINIC | Age: 37
End: 2022-05-17
Payer: COMMERCIAL

## 2022-05-17 VITALS
DIASTOLIC BLOOD PRESSURE: 92 MMHG | TEMPERATURE: 98.1 F | BODY MASS INDEX: 44.41 KG/M2 | OXYGEN SATURATION: 97 % | RESPIRATION RATE: 16 BRPM | WEIGHT: 293 LBS | HEIGHT: 68 IN | HEART RATE: 74 BPM | SYSTOLIC BLOOD PRESSURE: 143 MMHG

## 2022-05-17 DIAGNOSIS — B37.31 CANDIDA VAGINITIS: ICD-10-CM

## 2022-05-17 DIAGNOSIS — E66.01 MORBID OBESITY WITH BMI OF 45.0-49.9, ADULT (HCC): ICD-10-CM

## 2022-05-17 DIAGNOSIS — R73.01 IMPAIRED FASTING GLUCOSE: Primary | ICD-10-CM

## 2022-05-17 DIAGNOSIS — I10 ESSENTIAL HYPERTENSION WITH GOAL BLOOD PRESSURE LESS THAN 130/85: ICD-10-CM

## 2022-05-17 DIAGNOSIS — L30.4 INTERTRIGO: ICD-10-CM

## 2022-05-17 PROCEDURE — 99214 OFFICE O/P EST MOD 30 MIN: CPT | Performed by: FAMILY MEDICINE

## 2022-05-17 RX ORDER — TELMISARTAN 20 MG/1
TABLET ORAL
Qty: 90 TABLET | Refills: 3 | Status: SHIPPED | OUTPATIENT
Start: 2022-05-17

## 2022-05-17 RX ORDER — FLUCONAZOLE 150 MG/1
150 TABLET ORAL
Qty: 4 TABLET | Refills: 0 | Status: SHIPPED | OUTPATIENT
Start: 2022-05-17

## 2022-05-17 RX ORDER — SPIRONOLACTONE 25 MG/1
25 TABLET ORAL DAILY
Qty: 90 TABLET | Refills: 3 | Status: SHIPPED | OUTPATIENT
Start: 2022-05-17 | End: 2022-08-05 | Stop reason: SDUPTHER

## 2022-05-17 RX ORDER — LIRAGLUTIDE 6 MG/ML
1.8 INJECTION SUBCUTANEOUS DAILY
Qty: 9 EACH | Refills: 2 | Status: SHIPPED | OUTPATIENT
Start: 2022-05-17 | End: 2022-09-19 | Stop reason: ALTCHOICE

## 2022-05-17 NOTE — PROGRESS NOTES
Abel Paget presents today for   Chief Complaint   Patient presents with    Hypertension    Diabetes    Thyroid Problem    Weight Management       Is someone accompanying this pt? no    Is the patient using any DME equipment during OV? no    Depression Screening:  3 most recent PHQ Screens 5/17/2022   Little interest or pleasure in doing things Not at all   Feeling down, depressed, irritable, or hopeless Not at all   Total Score PHQ 2 0       Learning Assessment:  Learning Assessment 5/21/2019   PRIMARY LEARNER Patient   HIGHEST LEVEL OF EDUCATION - PRIMARY LEARNER  4 YEARS OF COLLEGE   BARRIERS PRIMARY LEARNER NONE   CO-LEARNER CAREGIVER No   PRIMARY LANGUAGE ENGLISH   LEARNER PREFERENCE PRIMARY DEMONSTRATION   ANSWERED BY patient   RELATIONSHIP SELF       Abuse Screening:  Abuse Screening Questionnaire 1/8/2021   Do you ever feel afraid of your partner? N   Are you in a relationship with someone who physically or mentally threatens you? N   Is it safe for you to go home? Y       Fall Risk  Fall Risk Assessment, last 12 mths 7/29/2020   Able to walk? Yes   Fall in past 12 months? No       Health Maintenance reviewed and discussed and ordered per Provider. Health Maintenance Due   Topic Date Due    Hepatitis C Screening  Never done    COVID-19 Vaccine (3 - Booster for Cirqle series) 09/28/2021   . Coordination of Care:  1. Have you been to the ER, urgent care clinic since your last visit? Hospitalized since your last visit? no    2. Have you seen or consulted any other health care providers outside of the [address] [place] [address] since your last visit? Include any pap smears or colon screening. no      Last  Checked na  Last UDS Checked na  Last Pain contract signed: na    Patient presents in office today for routine care. Patient concerns: b/p check, and weight management.

## 2022-05-17 NOTE — PROGRESS NOTES
Shamar Sumner is a 31 y.o.  female and presents with    Chief Complaint   Patient presents with    Hypertension    Diabetes    Thyroid Problem    Weight Management       Subjective:  Cardiovascular Review:  The patient has hypertension and obesity and impaired fasting glucose. Diet and Lifestyle: generally follows a low fat low cholesterol diet, generally follows a low sodium diet, follows a diabetic diet regularly, exercises regularly, nonsmoker  Home BP Monitoring: is not measured at home. Pertinent ROS: taking medications as instructed, no medication side effects noted, no TIA's, no chest pain on exertion, no dyspnea on exertion, no swelling of ankles.      ROS   General ROS: negative for - chills, fever; + weight gain; no fatigue  Psychological ROS: negative for - anxiety or depression  Ophthalmic ROS: negative for - blurry vision  ENT ROS: positive for - headaches, negative for nasal congestion or sore throat  Endocrine ROS: negative for - polydipsia/polyuria or temperature intolerance  Respiratory ROS: no cough, shortness of breath, or wheezing  Cardiovascular ROS: no chest pain or dyspnea on exertion  Genito-Urinary ROS: no dysuria, trouble voiding, or hematuria  Musculoskeletal ROS: negative for - joint pain or muscle pain  Neurological ROS: negative for - numbness/tingling or weakness  Dermatological ROS: negative for - rash or skin lesion changes     All other systems reviewed and are negative.       Objective:  Vitals:    05/17/22 1124   BP: (!) 143/92   Pulse: 74   Resp: 16   Temp: 98.1 °F (36.7 °C)   TempSrc: Temporal   SpO2: 97%   Weight: 319 lb (144.7 kg)   Height: 5' 8\" (1.727 m)   PainSc:   0 - No pain       alert, well appearing, and in no distress, oriented to person, place, and time and morbidly obese  Mental status - normal mood, behavior, speech, dress, motor activity, and thought processes  Chest - clear to auscultation, no wheezes, rales or rhonchi, symmetric air entry  Heart - normal rate, regular rhythm, normal S1, S2, no murmurs, rubs, clicks or gallops  Neurological - cranial nerves II through XII intact  LABS     TESTS      Assessment/Plan:    1. Essential hypertension with goal blood pressure less than 130/85  Restart ARB for better control of blood pressure  - telmisartan (MICARDIS) 20 mg tablet; TAKE 1 TABLET BY MOUTH DAILY  Dispense: 90 Tablet; Refill: 3    2. Morbid obesity with BMI of 45.0-49.9, adult ([facility] [address]  I have reviewed/discussed the above normal BMI with the patient. I have recommended the following interventions: dietary management education, guidance, and counseling and monitor weight .     - liraglutide (Victoza 3-Jeramy) 0.6 mg/0.1 mL (18 mg/3 mL) pnij; 1.8 mg by SubCUTAneous route daily. Dispense: 9 Each; Refill: 2    3. Impaired fasting glucose  Increase dose for better control  - liraglutide (Victoza 3-Jeramy) 0.6 mg/0.1 mL (18 mg/3 mL) pnij; 1.8 mg by SubCUTAneous route daily. Dispense: 9 Each; Refill: 2    4. Intertrigo  Continue treatment  - fluconazole (DIFLUCAN) 150 mg tablet; Take 1 Tablet by mouth every seven (7) days. FDA advises cautious prescribing of oral fluconazole in pregnancy. Dispense: 4 Tablet; Refill: 0    5. Candida vaginitis  Continue treatment  - fluconazole (DIFLUCAN) 150 mg tablet; Take 1 Tablet by mouth every seven (7) days. FDA advises cautious prescribing of oral fluconazole in pregnancy. Dispense: 4 Tablet; Refill: 0      Lab review: orders written for new lab studies as appropriate; see orders      I have discussed the diagnosis with the patient and the intended plan as seen in the above orders. The patient has received an after-visit summary and questions were answered concerning future plans. I have discussed medication side effects and warnings with the patient as well. I have reviewed the plan of care with the patient, accepted their input and they are in agreement with the treatment goals.

## 2022-05-18 LAB
EST. AVERAGE GLUCOSE BLD GHB EST-MCNC: 108 MG/DL
HBA1C MFR BLD: 5.4 % (ref 4.8–5.6)

## 2022-08-04 DIAGNOSIS — I10 ESSENTIAL HYPERTENSION WITH GOAL BLOOD PRESSURE LESS THAN 130/85: ICD-10-CM

## 2022-08-05 RX ORDER — SPIRONOLACTONE 25 MG/1
TABLET ORAL
Qty: 30 TABLET | Refills: 0 | Status: SHIPPED | OUTPATIENT
Start: 2022-08-05

## 2022-09-19 ENCOUNTER — OFFICE VISIT (OUTPATIENT)
Dept: FAMILY MEDICINE CLINIC | Age: 37
End: 2022-09-19
Payer: COMMERCIAL

## 2022-09-19 VITALS
OXYGEN SATURATION: 98 % | TEMPERATURE: 97.8 F | WEIGHT: 293 LBS | SYSTOLIC BLOOD PRESSURE: 124 MMHG | HEIGHT: 68 IN | BODY MASS INDEX: 44.41 KG/M2 | DIASTOLIC BLOOD PRESSURE: 82 MMHG | RESPIRATION RATE: 16 BRPM | HEART RATE: 70 BPM

## 2022-09-19 DIAGNOSIS — E05.90 HYPERTHYROIDISM: ICD-10-CM

## 2022-09-19 DIAGNOSIS — E66.01 MORBID OBESITY WITH BMI OF 45.0-49.9, ADULT (HCC): ICD-10-CM

## 2022-09-19 DIAGNOSIS — Z00.00 ANNUAL PHYSICAL EXAM: Primary | ICD-10-CM

## 2022-09-19 DIAGNOSIS — G56.01 RIGHT CARPAL TUNNEL SYNDROME: ICD-10-CM

## 2022-09-19 DIAGNOSIS — R20.2 ARM PARESTHESIA, RIGHT: ICD-10-CM

## 2022-09-19 DIAGNOSIS — Z23 NEEDS FLU SHOT: ICD-10-CM

## 2022-09-19 DIAGNOSIS — R73.01 IMPAIRED FASTING GLUCOSE: ICD-10-CM

## 2022-09-19 DIAGNOSIS — I10 ESSENTIAL HYPERTENSION WITH GOAL BLOOD PRESSURE LESS THAN 130/85: ICD-10-CM

## 2022-09-19 PROCEDURE — 99214 OFFICE O/P EST MOD 30 MIN: CPT | Performed by: FAMILY MEDICINE

## 2022-09-19 PROCEDURE — 90686 IIV4 VACC NO PRSV 0.5 ML IM: CPT | Performed by: FAMILY MEDICINE

## 2022-09-19 PROCEDURE — 90471 IMMUNIZATION ADMIN: CPT | Performed by: FAMILY MEDICINE

## 2022-09-19 RX ORDER — VERAPAMIL HYDROCHLORIDE 300 MG/1
CAPSULE, EXTENDED RELEASE ORAL
Qty: 90 CAPSULE | Refills: 3 | Status: SHIPPED | OUTPATIENT
Start: 2022-09-19

## 2022-09-19 RX ORDER — SEMAGLUTIDE 0.5 MG/.5ML
0.5 INJECTION, SOLUTION SUBCUTANEOUS
Qty: 4 EACH | Refills: 0 | Status: SHIPPED | OUTPATIENT
Start: 2022-09-19 | End: 2022-10-17 | Stop reason: ALTCHOICE

## 2022-09-19 NOTE — PROGRESS NOTES
Odessa Parra presents today for   Chief Complaint   Patient presents with    Hypertension    Diabetes    Thyroid Problem              Is someone accompanying this pt? no    Is the patient using any DME equipment during OV? no    Depression Screening:  3 most recent PHQ Screens 9/19/2022   Little interest or pleasure in doing things Not at all   Feeling down, depressed, irritable, or hopeless Not at all   Total Score PHQ 2 0       Learning Assessment:  Learning Assessment 5/21/2019   PRIMARY LEARNER Patient   HIGHEST LEVEL OF EDUCATION - PRIMARY LEARNER  4 YEARS OF COLLEGE   BARRIERS PRIMARY LEARNER NONE   CO-LEARNER CAREGIVER No   PRIMARY LANGUAGE ENGLISH   LEARNER PREFERENCE PRIMARY DEMONSTRATION   ANSWERED BY patient   RELATIONSHIP SELF       Abuse Screening:  Abuse Screening Questionnaire 1/8/2021   Do you ever feel afraid of your partner? N   Are you in a relationship with someone who physically or mentally threatens you? N   Is it safe for you to go home? Y       Fall Risk  Fall Risk Assessment, last 12 mths 7/29/2020   Able to walk? Yes   Fall in past 12 months? No       Health Maintenance reviewed and discussed and ordered per Provider. Health Maintenance Due   Topic Date Due    Hepatitis C Screening  Never done    COVID-19 Vaccine (3 - Booster for Pfizer series) 09/28/2021    Cervical cancer screen  06/25/2022    Flu Vaccine (1) 09/01/2022   . Coordination of Care:  1. Have you been to the ER, urgent care clinic since your last visit? Hospitalized since your last visit? no    2. Have you seen or consulted any other health care providers outside of the [address] [place] [address] since your last visit? Include any pap smears or colon screening. no      Last  Checked na  Last UDS Checked na  Last Pain contract signed: na    Patient presents in office today for routine care. Patient concerns: med eval for HTN.

## 2022-09-19 NOTE — PROGRESS NOTES
Marlene Garcia is a 30 y.o.  female and presents with    Chief Complaint   Patient presents with    Hypertension    Diabetes    Thyroid Problem           Complete Physical           Subjective: Well Adult Physical   Patient here for a comprehensive physical exam.The patient reports problems - right arm numbness in AM and then right hand goes numb while typing during the day. She sleep with her arm hanging off of the bed. Do you take any herbs or supplements that were not prescribed by a doctor? no Are you taking calcium   Cardiovascular Review:  The patient has hypertension and obesity and impaired fasting glucose. Diet and Lifestyle: generally follows a low fat low cholesterol diet, generally follows a low sodium diet, follows a diabetic diet regularly, exercises regularly, nonsmoker  Home BP Monitoring: is not measured at home. Pertinent ROS: taking medications as instructed, no medication side effects noted, no TIA's, no chest pain on exertion, no dyspnea on exertion, no swelling of ankles. ROS   General ROS: negative for - chills, fever; + weight loss; no fatigue  Psychological ROS: negative for - anxiety or depression  Ophthalmic ROS: negative for - blurry vision  ENT ROS: positive for - headaches, negative for nasal congestion or sore throat  Endocrine ROS: negative for - polydipsia/polyuria or temperature intolerance  Respiratory ROS: no cough, shortness of breath, or wheezing  Cardiovascular ROS: no chest pain or dyspnea on exertion  Genito-Urinary ROS: no dysuria, trouble voiding, or hematuria  Musculoskeletal ROS: negative for - joint pain or muscle pain  Neurological ROS: negative for - numbness/tingling or weakness  Dermatological ROS: negative for - rash or skin lesion changes     All other systems reviewed and are negative.          Objective:    Visit Vitals  /82 (BP 1 Location: Left upper arm, BP Patient Position: Sitting)   Pulse 70   Temp 97.8 °F (36.6 °C) (Temporal)   Resp 16   Ht 5' 8\" (1.727 m)   Wt 314 lb (142.4 kg)   SpO2 98%   BMI 47.74 kg/m²       General appearance  alert, cooperative, no distress, appears stated age   Head  Normocephalic, without obvious abnormality, atraumatic   Eyes  conjunctivae/corneas clear. PERRL, EOM's intact. Ears  normal TM's and external ear canals AU   Nose Nares normal. Septum midline. Mucosa normal. No drainage or sinus tenderness. Throat Lips, mucosa, and tongue normal. Teeth and gums normal   Neck supple, symmetrical, trachea midline, no adenopathy, thyroid: not enlarged, symmetric, no tenderness/mass/nodule   Back   symmetric, no curvature. ROM normal. No CVA tenderness   Lungs   clear to auscultation bilaterally   Breasts  Not examined   Heart  regular rate and rhythm, S1, S2 normal, no murmur, click, rub or gallop   Abdomen   soft, non-tender. Bowel sounds normal. No masses,  No organomegaly   Pelvic Deferred   Extremities extremities normal, atraumatic, no cyanosis or edema   Pulses 2+ and symmetric   Skin Skin color, texture, turgor normal. No rashes or lesions   Lymph nodes Cervical, supraclavicular, and axillary nodes normal.   Neurologic Normal     LABS     TESTS      Assessment/Plan:    1. Annual physical exam  Reviewed preventive recommendations    2. Arm paresthesia, right  Adjust sleeping position    3. Right carpal tunnel syndrome  Recommend night time wrist brace    4. Morbid obesity with BMI of 45.0-49.9, adult ([facility] [address]  I have reviewed/discussed the above normal BMI with the patient. I have recommended the following interventions: dietary management education, guidance, and counseling and encourage exercise . [place] [employer] - CBC WITH AUTOMATED DIFF; Future    5. Essential hypertension with goal blood pressure less than 130/85  At goal; continue treatment  - verapamil ER (VERELAN PM) 300 mg capsule; Take 1 capsule daily  Dispense: 90 Capsule; Refill: 3    6.  Hyperthyroidism  Assess for level and efficacy of treatment  - TSH 3RD GENERATION; Future    7. Impaired fasting glucose  Encourage low carb diet; glp 1 started      Lab review: orders written for new lab studies as appropriate; see orders      I have discussed the diagnosis with the patient and the intended plan as seen in the above orders. The patient has received an after-visit summary and questions were answered concerning future plans. I have discussed medication side effects and warnings with the patient as well. I have reviewed the plan of care with the patient, accepted their input and they are in agreement with the treatment goals.

## 2022-09-20 LAB
BASOPHILS # BLD AUTO: 0 X10E3/UL (ref 0–0.2)
BASOPHILS NFR BLD AUTO: 0 %
EOSINOPHIL # BLD AUTO: 0.1 X10E3/UL (ref 0–0.4)
EOSINOPHIL NFR BLD AUTO: 1 %
ERYTHROCYTE [DISTWIDTH] IN BLOOD BY AUTOMATED COUNT: 14.8 % (ref 11.7–15.4)
HCT VFR BLD AUTO: 38.1 % (ref 34–46.6)
HGB BLD-MCNC: 11.5 G/DL (ref 11.1–15.9)
IMM GRANULOCYTES # BLD AUTO: 0 X10E3/UL (ref 0–0.1)
IMM GRANULOCYTES NFR BLD AUTO: 0 %
LYMPHOCYTES # BLD AUTO: 1.9 X10E3/UL (ref 0.7–3.1)
LYMPHOCYTES NFR BLD AUTO: 32 %
MCH RBC QN AUTO: 21.7 PG (ref 26.6–33)
MCHC RBC AUTO-ENTMCNC: 30.2 G/DL (ref 31.5–35.7)
MCV RBC AUTO: 72 FL (ref 79–97)
MONOCYTES # BLD AUTO: 0.4 X10E3/UL (ref 0.1–0.9)
MONOCYTES NFR BLD AUTO: 6 %
NEUTROPHILS # BLD AUTO: 3.6 X10E3/UL (ref 1.4–7)
NEUTROPHILS NFR BLD AUTO: 61 %
PLATELET # BLD AUTO: 418 X10E3/UL (ref 150–450)
RBC # BLD AUTO: 5.3 X10E6/UL (ref 3.77–5.28)
TSH SERPL DL<=0.005 MIU/L-ACNC: 1.08 UIU/ML (ref 0.45–4.5)
WBC # BLD AUTO: 5.9 X10E3/UL (ref 3.4–10.8)

## 2022-09-21 ENCOUNTER — DOCUMENTATION ONLY (OUTPATIENT)
Dept: FAMILY MEDICINE CLINIC | Age: 37
End: 2022-09-21

## 2022-09-21 NOTE — PROGRESS NOTES
Raghav Gupta is a 30 y.o. female (: 1985) presenting to address:    No chief complaint on file. There were no vitals filed for this visit. Hearing/Vision:   No results found. Learning Assessment:     Learning Assessment 2019   PRIMARY LEARNER Patient   HIGHEST LEVEL OF EDUCATION - PRIMARY LEARNER  4 YEARS OF COLLEGE   BARRIERS PRIMARY LEARNER NONE   CO-LEARNER CAREGIVER No   PRIMARY LANGUAGE ENGLISH   LEARNER PREFERENCE PRIMARY DEMONSTRATION   ANSWERED BY patient   RELATIONSHIP SELF     Depression Screening:     3 most recent PHQ Screens 2022   Little interest or pleasure in doing things Not at all   Feeling down, depressed, irritable, or hopeless Not at all   Total Score PHQ 2 0     Fall Risk Assessment:     Fall Risk Assessment, last 12 mths 2020   Able to walk? Yes   Fall in past 12 months? No     Abuse Screening:     Abuse Screening Questionnaire 2021   Do you ever feel afraid of your partner? N   Are you in a relationship with someone who physically or mentally threatens you? N   Is it safe for you to go home? Y     ADL Assessment:     ADL Assessment 2020   Feeding yourself No Help Needed   Getting from bed to chair No Help Needed   Getting dressed No Help Needed   Bathing or showering No Help Needed   Walk across the room (includes cane/walker) No Help Needed   Using the telphone No Help Needed   Taking your medications No Help Needed   Preparing meals No Help Needed   Managing money (expenses/bills) No Help Needed   Moderately strenuous housework (laundry) No Help Needed   Shopping for personal items (toiletries/medicines) No Help Needed   Shopping for groceries No Help Needed   Driving No Help Needed   Climbing a flight of stairs No Help Needed   Getting to places beyond walking distances No Help Needed        Coordination of Care Questionaire:     1. \"Have you been to the ER, urgent care clinic since your last visit? Hospitalized since your last visit? \" {Yes when where and reason for visit:20441}    2. \"Have you seen or consulted any other health care providers outside of the [address] [place] [address] since your last visit? \" {Yes when where and reason for visit:20441}     3. For patients aged 39-70: Has the patient had a colonoscopy / FIT/ Cologuard? {Colon Cancer Care Gap present?:66478}      If the patient is female:    4. For patients aged 41-77: Has the patient had a mammogram within the past 2 years? {Cancer Care Gap present?:19606}      5. For patients aged 21-65: Has the patient had a pap smear?  {Cancer Care Gap present?:59028}

## 2022-10-17 ENCOUNTER — OFFICE VISIT (OUTPATIENT)
Dept: FAMILY MEDICINE CLINIC | Age: 37
End: 2022-10-17
Payer: COMMERCIAL

## 2022-10-17 VITALS
SYSTOLIC BLOOD PRESSURE: 128 MMHG | TEMPERATURE: 97.3 F | RESPIRATION RATE: 17 BRPM | DIASTOLIC BLOOD PRESSURE: 90 MMHG | HEIGHT: 68 IN | HEART RATE: 71 BPM | WEIGHT: 293 LBS | BODY MASS INDEX: 44.41 KG/M2 | OXYGEN SATURATION: 98 %

## 2022-10-17 DIAGNOSIS — E66.01 MORBID OBESITY WITH BMI OF 45.0-49.9, ADULT (HCC): ICD-10-CM

## 2022-10-17 DIAGNOSIS — I10 ESSENTIAL HYPERTENSION WITH GOAL BLOOD PRESSURE LESS THAN 130/85: ICD-10-CM

## 2022-10-17 DIAGNOSIS — R73.01 IMPAIRED FASTING GLUCOSE: ICD-10-CM

## 2022-10-17 DIAGNOSIS — G56.01 RIGHT CARPAL TUNNEL SYNDROME: Primary | ICD-10-CM

## 2022-10-17 PROCEDURE — 3078F DIAST BP <80 MM HG: CPT | Performed by: FAMILY MEDICINE

## 2022-10-17 PROCEDURE — 99214 OFFICE O/P EST MOD 30 MIN: CPT | Performed by: FAMILY MEDICINE

## 2022-10-17 PROCEDURE — 3074F SYST BP LT 130 MM HG: CPT | Performed by: FAMILY MEDICINE

## 2022-10-17 RX ORDER — GABAPENTIN 100 MG/1
100 CAPSULE ORAL
Qty: 90 CAPSULE | Refills: 0 | Status: SHIPPED | OUTPATIENT
Start: 2022-10-17

## 2022-10-17 RX ORDER — SEMAGLUTIDE 1.34 MG/ML
INJECTION, SOLUTION SUBCUTANEOUS
Qty: 1 PEN | Refills: 12 | Status: SHIPPED | OUTPATIENT
Start: 2022-10-17

## 2022-10-17 RX ORDER — DULAGLUTIDE 0.75 MG/.5ML
0.75 INJECTION, SOLUTION SUBCUTANEOUS
Qty: 12 PEN | Refills: 4 | Status: SHIPPED | OUTPATIENT
Start: 2022-10-17 | End: 2022-10-17 | Stop reason: ALTCHOICE

## 2022-10-17 NOTE — PROGRESS NOTES
Tiburcio Kumar is a 30 y.o.  female and presents with    Chief Complaint   Patient presents with    Arm Problem    Medication Refill     Subjective:  Carpal Tunnel Syndrome  Patient returns for re-evaluation of possible CTS. Onset of the symptoms was a year ago. Current symptoms include  right arm numbness in AM and then right hand goes numb while typing during the day. She sleep with her arm hanging off of the bed. Treatment to date: wrist splints used for 2 weeks: not very effective. Cardiovascular Review:  The patient has hypertension and obesity and impaired fasting glucose. Diet and Lifestyle: generally follows a low fat low cholesterol diet, generally follows a low sodium diet, follows a diabetic diet regularly, exercises regularly, nonsmoker  Home BP Monitoring: is not measured at home. Pertinent ROS: taking medications as instructed, no medication side effects noted, no TIA's, no chest pain on exertion, no dyspnea on exertion, no swelling of ankles. ROS   General ROS: negative for - chills, fever; + weight loss; no fatigue  Psychological ROS: negative for - anxiety or depression  Ophthalmic ROS: negative for - blurry vision  ENT ROS: positive for - headaches, negative for nasal congestion or sore throat  Endocrine ROS: negative for - polydipsia/polyuria or temperature intolerance  Respiratory ROS: no cough, shortness of breath, or wheezing  Cardiovascular ROS: no chest pain or dyspnea on exertion  Genito-Urinary ROS: no dysuria, trouble voiding, or hematuria  Musculoskeletal ROS: positive for - joint pain or muscle pain  Neurological ROS: positive for - numbness/tingling or weakness  Dermatological ROS: negative for - rash or skin lesion changes  All other systems reviewed and are negative.       Objective:  Vitals:    10/17/22 1145 10/17/22 1147   BP: (!) 156/89 (!) 128/90   Pulse: 71    Resp: 17    Temp: 97.3 °F (36.3 °C)    TempSrc: Temporal    SpO2: 98%    Weight: 315 lb (142.9 kg)    Height: 5' 8\" (1.727 m)    LMP: 10/16/2022       Mental status - normal mood, behavior, speech, dress, motor activity, and thought processes  Neurological - cranial nerves II through XII intact  Musculoskeletal - abnormal exam of right hand pain with motion    LABS     TESTS      Assessment/Plan:    1. Right carpal tunnel syndrome  Refer for further evaluation and treatment; pain management while awaiting assessment  - REFERRAL TO ORTHOPEDICS  - gabapentin (NEURONTIN) 100 mg capsule; Take 1 Capsule by mouth nightly. Max Daily Amount: 100 mg. Dispense: 90 Capsule; Refill: 0    2. Morbid obesity with BMI of 45.0-49.9, adult ([facility]  I have reviewed/discussed the above normal BMI with the patient. I have recommended the following interventions: dietary management education, guidance, and counseling, encourage exercise, and monitor weight . .      - Ozempic 0.25 mg or 0.5 mg/dose (2 mg/1.5 ml) subq pen; Start with 0.25mg weekly for 4 weeks, then increase to 0.5mg weekly  Indications: type 2 diabetes mellitus  Dispense: 1 Pen; Refill: 12    3. Essential hypertension with goal blood pressure less than 130/85  Continue treatment; likely will improve with weight loss    4. Impaired fasting glucose    - Ozempic 0.25 mg or 0.5 mg/dose (2 mg/1.5 ml) subq pen; Start with 0.25mg weekly for 4 weeks, then increase to 0.5mg weekly  Indications: type 2 diabetes mellitus  Dispense: 1 Pen; Refill: 12      Lab review: no lab studies available for review at time of visit      I have discussed the diagnosis with the patient and the intended plan as seen in the above orders. The patient has received an after-visit summary and questions were answered concerning future plans. I have discussed medication side effects and warnings with the patient as well. I have reviewed the plan of care with the patient, accepted their input and they are in agreement with the treatment goals.

## 2022-10-17 NOTE — PROGRESS NOTES
Christin Rowland is a 30 y.o. female (: 1985) presenting to address:    Chief Complaint   Patient presents with    Arm Problem    Medication Refill       There were no vitals filed for this visit. Hearing/Vision:   No results found. Learning Assessment:     Learning Assessment 2019   PRIMARY LEARNER Patient   HIGHEST LEVEL OF EDUCATION - PRIMARY LEARNER  4 YEARS OF COLLEGE   BARRIERS PRIMARY LEARNER NONE   CO-LEARNER CAREGIVER No   PRIMARY LANGUAGE ENGLISH   LEARNER PREFERENCE PRIMARY DEMONSTRATION   ANSWERED BY patient   RELATIONSHIP SELF     Depression Screening:     3 most recent PHQ Screens 10/17/2022   Little interest or pleasure in doing things Not at all   Feeling down, depressed, irritable, or hopeless Not at all   Total Score PHQ 2 0     Fall Risk Assessment:     Fall Risk Assessment, last 12 mths 2020   Able to walk? Yes   Fall in past 12 months? No     Abuse Screening:     Abuse Screening Questionnaire 2021   Do you ever feel afraid of your partner? N   Are you in a relationship with someone who physically or mentally threatens you? N   Is it safe for you to go home? Y     ADL Assessment:     ADL Assessment 2020   Feeding yourself No Help Needed   Getting from bed to chair No Help Needed   Getting dressed No Help Needed   Bathing or showering No Help Needed   Walk across the room (includes cane/walker) No Help Needed   Using the telphone No Help Needed   Taking your medications No Help Needed   Preparing meals No Help Needed   Managing money (expenses/bills) No Help Needed   Moderately strenuous housework (laundry) No Help Needed   Shopping for personal items (toiletries/medicines) No Help Needed   Shopping for groceries No Help Needed   Driving No Help Needed   Climbing a flight of stairs No Help Needed   Getting to places beyond walking distances No Help Needed        Coordination of Care Questionaire:     1.  \"Have you been to the ER, urgent care clinic since your last visit? Hospitalized since your last visit? \" No    2. \"Have you seen or consulted any other health care providers outside of the [address] [place] [address] since your last visit? \" No     3. For patients aged 39-70: Has the patient had a colonoscopy / FIT/ Cologuard? NA - based on age      If the patient is female:    4. For patients aged 41-77: Has the patient had a mammogram within the past 2 years? NA - based on age or sex      11. For patients aged 21-65: Has the patient had a pap smear?  No

## 2022-11-29 ENCOUNTER — TELEPHONE (OUTPATIENT)
Dept: FAMILY MEDICINE CLINIC | Age: 37
End: 2022-11-29

## 2022-11-29 NOTE — TELEPHONE ENCOUNTER
Patient called in requesting an update on her prior authorization for Ozempic that has been bending over a month. She is asking if there is anything else instead of ozempic till the approved or another FREE trial.    Please inform patient. Call her at [phone]. Thank you.

## 2023-01-23 ENCOUNTER — OFFICE VISIT (OUTPATIENT)
Dept: FAMILY MEDICINE CLINIC | Age: 38
End: 2023-01-23
Payer: MEDICAID

## 2023-01-23 VITALS
DIASTOLIC BLOOD PRESSURE: 87 MMHG | HEART RATE: 72 BPM | BODY MASS INDEX: 44.41 KG/M2 | OXYGEN SATURATION: 99 % | HEIGHT: 68 IN | RESPIRATION RATE: 16 BRPM | SYSTOLIC BLOOD PRESSURE: 131 MMHG | TEMPERATURE: 97.8 F | WEIGHT: 293 LBS

## 2023-01-23 DIAGNOSIS — R53.83 FATIGUE, UNSPECIFIED TYPE: ICD-10-CM

## 2023-01-23 DIAGNOSIS — D50.8 IRON DEFICIENCY ANEMIA SECONDARY TO INADEQUATE DIETARY IRON INTAKE: Primary | ICD-10-CM

## 2023-01-23 DIAGNOSIS — I10 ESSENTIAL HYPERTENSION WITH GOAL BLOOD PRESSURE LESS THAN 130/85: ICD-10-CM

## 2023-01-23 DIAGNOSIS — E66.01 MORBID OBESITY WITH BMI OF 45.0-49.9, ADULT (HCC): ICD-10-CM

## 2023-01-23 DIAGNOSIS — R73.01 IMPAIRED FASTING GLUCOSE: ICD-10-CM

## 2023-01-23 DIAGNOSIS — N92.0 MENORRHAGIA WITH REGULAR CYCLE: ICD-10-CM

## 2023-01-23 DIAGNOSIS — Z86.16 PERSONAL HISTORY OF COVID-19: ICD-10-CM

## 2023-01-23 PROCEDURE — 3079F DIAST BP 80-89 MM HG: CPT | Performed by: FAMILY MEDICINE

## 2023-01-23 PROCEDURE — 99214 OFFICE O/P EST MOD 30 MIN: CPT | Performed by: FAMILY MEDICINE

## 2023-01-23 PROCEDURE — 3075F SYST BP GE 130 - 139MM HG: CPT | Performed by: FAMILY MEDICINE

## 2023-01-23 RX ORDER — NORGESTIMATE AND ETHINYL ESTRADIOL 7DAYSX3 LO
1 KIT ORAL DAILY
Qty: 84 TABLET | Refills: 3 | Status: SHIPPED | OUTPATIENT
Start: 2023-01-23

## 2023-01-23 NOTE — PROGRESS NOTES
Barbara Moss is a 30 y.o.  female and presents with    Chief Complaint   Patient presents with    Fatigue    Hypertension    Thyroid Problem     Med refills           Subjective:  Ms. Francheska Barker is following up for iron deficiency; she c/o fatigue; she is taking iron supplement. She reports no energy. She does not feel rested in the morning. She denies waking at night;  does not reports snoring. 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    Cardiovascular Review:  The patient has hypertension and obesity and impaired fasting glucose. Diet and Lifestyle: generally follows a low fat low cholesterol diet, generally follows a low sodium diet, follows a diabetic diet regularly, exercises regularly, nonsmoker  Home BP Monitoring: is not measured at home. Pertinent ROS: taking medications as instructed, no medication side effects noted, no TIA's, no chest pain on exertion, no dyspnea on exertion, no swelling of ankles. ROS   General ROS: negative for - chills, fever; + weight loss; no fatigue  Psychological ROS: negative for - anxiety or depression  Ophthalmic ROS: negative for - blurry vision  ENT ROS: positive for - headaches, negative for nasal congestion or sore throat  Endocrine ROS: negative for - polydipsia/polyuria or temperature intolerance  Respiratory ROS: no cough, shortness of breath, or wheezing  Cardiovascular ROS: no chest pain or dyspnea on exertion  Genito-Urinary ROS: no dysuria, trouble voiding, or hematuria  Musculoskeletal ROS: positive for - joint pain or muscle pain  Neurological ROS: positive for - numbness/tingling or weakness  Dermatological ROS: negative for - rash or skin lesion changes  All other systems reviewed and are negative.       Objective:  Vitals:    01/23/23 1509   BP: 131/87   Pulse: 72   Resp: 16   Temp: 97.8 °F (36.6 °C)   TempSrc: Temporal SpO2: 99%   Weight: 304 lb (137.9 kg)   Height: 5' 8\" (1.727 m)   PainSc:   0 - No pain   LMP: 01/21/2023       alert, well appearing, and in no distress, oriented to person, place, and time, and morbidly obese  Mental status - normal mood, behavior, speech, dress, motor activity, and thought processes  Eyes - conjunctival pallor  Chest - clear to auscultation, no wheezes, rales or rhonchi, symmetric air entry  Heart - normal rate, regular rhythm, normal S1, S2, no murmurs, rubs, clicks or gallops    LABS     TESTS      Assessment/Plan:    1. Iron deficiency anemia secondary to inadequate dietary iron intake  Taking iron; assess efficacy of treatment  - CBC WITH AUTOMATED DIFF; Future  - IRON PROFILE; Future    2. Morbid obesity with BMI of 45.0-49.9, adult ([facility]  I have reviewed/discussed the above normal BMI with the patient. I have recommended the following interventions: dietary management education, guidance, and counseling and encourage exercise . Galen Brewster 3. Impaired fasting glucose  Continue glp 1    4. Essential hypertension with goal blood pressure less than 130/85  Improved with current treatment; continue to monitor with starting ocp    5. Fatigue, unspecified type  Assess for etiology; likely anemia/iron deficiency associated with menorrhagia  - TSH 3RD GENERATION; Future    6. Menorrhagia with regular cycle  Start ocp to decrease flow to determine if this is part of fatigue  - norgestimate-ethinyl estradioL (ORTHO TRI-CYCLEN LO) 0.18/0.215/0.25 mg-25 mcg tab; Take 1 Tablet by mouth daily. Indications: abnormally long or heavy periods  Dispense: 84 Tablet; Refill: 3    7. History of covid 19  Potential long covid with fatigue  Lab review: orders written for new lab studies as appropriate; see orders      I have discussed the diagnosis with the patient and the intended plan as seen in the above orders.   The patient has received an after-visit summary and questions were answered concerning future plans.  I have discussed medication side effects and warnings with the patient as well. I have reviewed the plan of care with the patient, accepted their input and they are in agreement with the treatment goals.

## 2023-01-23 NOTE — PROGRESS NOTES
Odessa Parra presents today for   Chief Complaint   Patient presents with    Fatigue    Hypertension    Thyroid Problem     Med refills       Is someone accompanying this pt? no    Is the patient using any DME equipment during OV? no    Depression Screening:  3 most recent PHQ Screens 1/23/2023   Little interest or pleasure in doing things Not at all   Feeling down, depressed, irritable, or hopeless Not at all   Total Score PHQ 2 0       Learning Assessment:  Learning Assessment 5/21/2019   PRIMARY LEARNER Patient   HIGHEST LEVEL OF EDUCATION - PRIMARY LEARNER  4 YEARS OF COLLEGE   BARRIERS PRIMARY LEARNER NONE   CO-LEARNER CAREGIVER No   PRIMARY LANGUAGE ENGLISH   LEARNER PREFERENCE PRIMARY DEMONSTRATION   ANSWERED BY patient   RELATIONSHIP SELF       Abuse Screening:  Abuse Screening Questionnaire 1/8/2021   Do you ever feel afraid of your partner? N   Are you in a relationship with someone who physically or mentally threatens you? N   Is it safe for you to go home? Y       Fall Risk  Fall Risk Assessment, last 12 mths 7/29/2020   Able to walk? Yes   Fall in past 12 months? No       Health Maintenance reviewed and discussed and ordered per Provider. Health Maintenance Due   Topic Date Due    Hepatitis C Screening  Never done    COVID-19 Vaccine (3 - Booster for Pfizer series) 06/23/2021    Cervical cancer screen  06/25/2022   .        1. \"Have you been to the ER, urgent care clinic since your last visit? Hospitalized since your last visit? \" No    2. \"Have you seen or consulted any other health care providers outside of the [address] [place] [address] since your last visit? \" No     3. For patients over 45: Has the patient had a colonoscopy? NA - based on age     If the patient is female:    4. For patients over 36: Has the patient had a mammogram? NA - based on age    11. For patients over 21: Has the patient had a pap smear?  Yes - no Care Gap present

## 2023-01-24 LAB
BASOPHILS # BLD AUTO: 0 X10E3/UL (ref 0–0.2)
BASOPHILS NFR BLD AUTO: 0 %
EOSINOPHIL # BLD AUTO: 0.1 X10E3/UL (ref 0–0.4)
EOSINOPHIL NFR BLD AUTO: 1 %
ERYTHROCYTE [DISTWIDTH] IN BLOOD BY AUTOMATED COUNT: 15 % (ref 11.7–15.4)
HCT VFR BLD AUTO: 37.9 % (ref 34–46.6)
HGB BLD-MCNC: 11.8 G/DL (ref 11.1–15.9)
IMM GRANULOCYTES # BLD AUTO: 0 X10E3/UL (ref 0–0.1)
IMM GRANULOCYTES NFR BLD AUTO: 0 %
IRON SATN MFR SERPL: 39 % (ref 15–55)
IRON SERPL-MCNC: 96 UG/DL (ref 27–159)
LYMPHOCYTES # BLD AUTO: 1.8 X10E3/UL (ref 0.7–3.1)
LYMPHOCYTES NFR BLD AUTO: 33 %
MCH RBC QN AUTO: 22.2 PG (ref 26.6–33)
MCHC RBC AUTO-ENTMCNC: 31.1 G/DL (ref 31.5–35.7)
MCV RBC AUTO: 71 FL (ref 79–97)
MONOCYTES # BLD AUTO: 0.5 X10E3/UL (ref 0.1–0.9)
MONOCYTES NFR BLD AUTO: 9 %
NEUTROPHILS # BLD AUTO: 3.1 X10E3/UL (ref 1.4–7)
NEUTROPHILS NFR BLD AUTO: 57 %
PLATELET # BLD AUTO: 372 X10E3/UL (ref 150–450)
RBC # BLD AUTO: 5.32 X10E6/UL (ref 3.77–5.28)
TIBC SERPL-MCNC: 247 UG/DL (ref 250–450)
TSH SERPL DL<=0.005 MIU/L-ACNC: 1.06 UIU/ML (ref 0.45–4.5)
UIBC SERPL-MCNC: 151 UG/DL (ref 131–425)
WBC # BLD AUTO: 5.4 X10E3/UL (ref 3.4–10.8)

## 2023-02-03 DIAGNOSIS — G56.01 RIGHT CARPAL TUNNEL SYNDROME: ICD-10-CM

## 2023-02-03 DIAGNOSIS — I10 ESSENTIAL HYPERTENSION WITH GOAL BLOOD PRESSURE LESS THAN 130/85: ICD-10-CM

## 2023-02-03 RX ORDER — GABAPENTIN 100 MG/1
CAPSULE ORAL
Qty: 90 CAPSULE | Refills: 0 | Status: SHIPPED | OUTPATIENT
Start: 2023-02-03

## 2023-02-03 RX ORDER — VERAPAMIL HYDROCHLORIDE 300 MG/1
CAPSULE, EXTENDED RELEASE ORAL
Qty: 30 CAPSULE | Refills: 0 | Status: SHIPPED | OUTPATIENT
Start: 2023-02-03

## 2023-05-24 DIAGNOSIS — I10 ESSENTIAL (PRIMARY) HYPERTENSION: Primary | ICD-10-CM

## 2023-05-24 RX ORDER — SPIRONOLACTONE 25 MG/1
25 TABLET ORAL DAILY
Qty: 90 TABLET | Refills: 4 | Status: SHIPPED | OUTPATIENT
Start: 2023-05-24

## 2023-05-24 RX ORDER — VERAPAMIL HYDROCHLORIDE 300 MG/1
300 CAPSULE, EXTENDED RELEASE ORAL DAILY
Qty: 90 CAPSULE | Refills: 4 | Status: SHIPPED | OUTPATIENT
Start: 2023-05-24

## 2023-06-01 RX ORDER — VERAPAMIL HYDROCHLORIDE 300 MG/1
CAPSULE, EXTENDED RELEASE ORAL
Qty: 30 CAPSULE | Refills: 0 | OUTPATIENT
Start: 2023-06-01

## 2023-06-01 RX ORDER — SPIRONOLACTONE 25 MG/1
TABLET ORAL
Qty: 30 TABLET | Refills: 0 | OUTPATIENT
Start: 2023-06-01

## 2023-06-06 RX ORDER — TELMISARTAN 20 MG/1
TABLET ORAL
Qty: 90 TABLET | Refills: 3 | Status: SHIPPED | OUTPATIENT
Start: 2023-06-06

## 2023-11-01 ENCOUNTER — TELEPHONE (OUTPATIENT)
Facility: CLINIC | Age: 38
End: 2023-11-01

## 2023-11-01 NOTE — TELEPHONE ENCOUNTER
Patient is requesting the following refill:      Semaglutide,0.25 or 0.5MG/DOS, (OZEMPIC, 0.25 OR 0.5 MG/DOSE,) 2 MG/1.5ML SOPN [[id number]    Please advise    Thank you

## 2023-11-03 DIAGNOSIS — E66.01 MORBID (SEVERE) OBESITY DUE TO EXCESS CALORIES (HCC): Primary | ICD-10-CM

## 2024-01-03 ENCOUNTER — TELEPHONE (OUTPATIENT)
Facility: CLINIC | Age: 39
End: 2024-01-03

## 2024-01-03 NOTE — TELEPHONE ENCOUNTER
Ruth with [employer] called to see if Dr. Warner would like to renew the authorization for Ozonic 0.25 mg that  on 23?    Please reference #:  [id number].    Please advise.  Thank you.

## 2024-01-11 ENCOUNTER — OFFICE VISIT (OUTPATIENT)
Facility: CLINIC | Age: 39
End: 2024-01-11

## 2024-01-11 VITALS
HEIGHT: 68 IN | RESPIRATION RATE: 18 BRPM | BODY MASS INDEX: 44.41 KG/M2 | SYSTOLIC BLOOD PRESSURE: 133 MMHG | WEIGHT: 293 LBS | DIASTOLIC BLOOD PRESSURE: 86 MMHG | TEMPERATURE: 98 F | OXYGEN SATURATION: 97 % | HEART RATE: 82 BPM

## 2024-01-11 DIAGNOSIS — F43.21 GRIEVING: ICD-10-CM

## 2024-01-11 DIAGNOSIS — E66.01 MORBID (SEVERE) OBESITY DUE TO EXCESS CALORIES (HCC): ICD-10-CM

## 2024-01-11 DIAGNOSIS — R73.01 IMPAIRED FASTING GLUCOSE: ICD-10-CM

## 2024-01-11 DIAGNOSIS — Z63.0 MARITAL STRESS: ICD-10-CM

## 2024-01-11 DIAGNOSIS — N93.8 DYSFUNCTIONAL UTERINE BLEEDING: Primary | ICD-10-CM

## 2024-01-11 DIAGNOSIS — E05.90 HYPERTHYROIDISM: ICD-10-CM

## 2024-01-11 RX ORDER — SEMAGLUTIDE 2.68 MG/ML
2 INJECTION, SOLUTION SUBCUTANEOUS
Qty: 3 ML | Refills: 5 | Status: SHIPPED | OUTPATIENT
Start: 2024-01-11

## 2024-01-11 RX ORDER — NORGESTIMATE AND ETHINYL ESTRADIOL 7DAYSX3 LO
KIT ORAL
Qty: 1 PACKET | Refills: 0 | Status: SHIPPED | OUTPATIENT
Start: 2024-01-11

## 2024-01-11 RX ORDER — NORGESTIMATE AND ETHINYL ESTRADIOL 7DAYSX3 LO
1 KIT ORAL DAILY
COMMUNITY
Start: 2023-01-23 | End: 2024-01-11 | Stop reason: SDUPTHER

## 2024-01-11 RX ORDER — ONDANSETRON 4 MG/1
4 TABLET, FILM COATED ORAL DAILY PRN
Qty: 12 TABLET | Refills: 0 | Status: SHIPPED | OUTPATIENT
Start: 2024-01-11

## 2024-01-11 SDOH — SOCIAL STABILITY - SOCIAL INSECURITY: PROBLEMS IN RELATIONSHIP WITH SPOUSE OR PARTNER: Z63.0

## 2024-01-11 SDOH — ECONOMIC STABILITY: INCOME INSECURITY: HOW HARD IS IT FOR YOU TO PAY FOR THE VERY BASICS LIKE FOOD, HOUSING, MEDICAL CARE, AND HEATING?: NOT HARD AT ALL

## 2024-01-11 SDOH — ECONOMIC STABILITY: FOOD INSECURITY: WITHIN THE PAST 12 MONTHS, YOU WORRIED THAT YOUR FOOD WOULD RUN OUT BEFORE YOU GOT MONEY TO BUY MORE.: NEVER TRUE

## 2024-01-11 SDOH — ECONOMIC STABILITY: FOOD INSECURITY: WITHIN THE PAST 12 MONTHS, THE FOOD YOU BOUGHT JUST DIDN'T LAST AND YOU DIDN'T HAVE MONEY TO GET MORE.: NEVER TRUE

## 2024-01-11 SDOH — ECONOMIC STABILITY: HOUSING INSECURITY
IN THE LAST 12 MONTHS, WAS THERE A TIME WHEN YOU DID NOT HAVE A STEADY PLACE TO SLEEP OR SLEPT IN A SHELTER (INCLUDING NOW)?: NO

## 2024-01-11 ASSESSMENT — LIFESTYLE VARIABLES
HOW OFTEN DO YOU HAVE A DRINK CONTAINING ALCOHOL: NEVER
HOW MANY STANDARD DRINKS CONTAINING ALCOHOL DO YOU HAVE ON A TYPICAL DAY: PATIENT DOES NOT DRINK

## 2024-01-11 ASSESSMENT — PATIENT HEALTH QUESTIONNAIRE - PHQ9
SUM OF ALL RESPONSES TO PHQ QUESTIONS 1-9: 0
1. LITTLE INTEREST OR PLEASURE IN DOING THINGS: 0
SUM OF ALL RESPONSES TO PHQ9 QUESTIONS 1 & 2: 0
SUM OF ALL RESPONSES TO PHQ QUESTIONS 1-9: 0
SUM OF ALL RESPONSES TO PHQ QUESTIONS 1-9: 0
2. FEELING DOWN, DEPRESSED OR HOPELESS: 0
SUM OF ALL RESPONSES TO PHQ QUESTIONS 1-9: 0

## 2024-01-11 ASSESSMENT — ANXIETY QUESTIONNAIRES
7. FEELING AFRAID AS IF SOMETHING AWFUL MIGHT HAPPEN: 0
3. WORRYING TOO MUCH ABOUT DIFFERENT THINGS: 0
IF YOU CHECKED OFF ANY PROBLEMS ON THIS QUESTIONNAIRE, HOW DIFFICULT HAVE THESE PROBLEMS MADE IT FOR YOU TO DO YOUR WORK, TAKE CARE OF THINGS AT HOME, OR GET ALONG WITH OTHER PEOPLE: NOT DIFFICULT AT ALL
GAD7 TOTAL SCORE: 0
4. TROUBLE RELAXING: 0
6. BECOMING EASILY ANNOYED OR IRRITABLE: 0
2. NOT BEING ABLE TO STOP OR CONTROL WORRYING: 0
5. BEING SO RESTLESS THAT IT IS HARD TO SIT STILL: 0
1. FEELING NERVOUS, ANXIOUS, OR ON EDGE: 0

## 2024-01-11 NOTE — PROGRESS NOTES
Ira Lerma is a 38 y.o.  female and presents with    Chief Complaint   Patient presents with    Annual Exam           Subjective:    Ms. Lerma presents today for a follow up. She reports significant fatigue for the past few months. She is concerned this is related to her heavy menstrual periods. She has gone through about 4 super tampons per day for the past 2 weeks after restarting her birth control about 3 weeks ago.     She also endorses recent weight gain. She recently ran out of her semaglutide 2 weeks ago. She was down to 248 lbs at one point, but her weight has been steadily increasing. This could be due to her stress eating after some recent significant life events. 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 However, still drinking sodas. She has a hx of thyroid disease (hyperthyroidism). She also endorses cold intolerance, constipation, and depressed mood.       ROS   General ROS: negative for - chills, fever; positive for - weight gain, fatigue  Psychological ROS: negative for - anxiety. Positive for- decreased mood  Ophthalmic ROS: negative for - blurry vision  ENT ROS:  negative for headaches, nasal congestion or sore throat  Endocrine ROS: negative for - polydipsia/polyuria; positive for cold intolerance  Respiratory ROS: no cough, shortness of breath, or wheezing  Cardiovascular ROS: no chest pain or dyspnea on exertion  Gastrointestinal ROS: negative for diarrhea, vomiting, nausea; positive for constipation  Genito-Urinary ROS: no dysuria, trouble voiding, or hematuria; positive for heavy menstrual bleeding  Musculoskeletal ROS: positive for - joint pain or muscle pain  Neurological ROS: negative for - numbness/tingling or weakness  Dermatological ROS: negative for - rash or skin lesion changes  All other systems reviewed and are negative.        Objective:  Vitals:    01/11/24 1325   BP: 133/86   Pulse: 82   Resp: 18   Temp: 98 °F (36.7 
Ira Lerma is a 38 y.o. presents today for   Chief Complaint   Patient presents with    Annual Exam     Is someone accompanying this pt? no    Is the patient using any DME equipment during OV? no  There were no vitals filed for this visit.    Depression Screenin/11/2024     1:16 PM 2023     3:08 PM 10/17/2022    11:41 AM 2022    11:22 AM 2022    11:23 AM   PHQ-9 Questionaire   Little interest or pleasure in doing things 0 0 0 0 0   Feeling down, depressed, or hopeless 0 0 0 0 0   PHQ-9 Total Score 0 0 0 0 0        Abuse Screenin/11/2024     1:00 PM   AMB Abuse Screening   Do you ever feel afraid of your partner? N   Are you in a relationship with someone who physically or mentally threatens you? N   Is it safe for you to go home? Y        Learning Assessment Screening:   No question data found.     Fall Risk Screenin/11/2024     1:16 PM   Fall Risk   2 or more falls in past year? no   Fall with injury in past year? no           Health Maintenance: reviewed and discussed and ordered per Provider.    Health Maintenance Due   Topic Date Due    Varicella vaccine (1 of 2 - 2-dose childhood series) Never done    Hepatitis C screen  Never done    Cervical cancer screen  2017    Hepatitis B vaccine (3 of 3 - 19+ 3-dose series) 2021    A1C test (Diabetic or Prediabetic)  2023    Flu vaccine (1) 2023    COVID-19 Vaccine (3 - -24 season) 2023    Depression Screen  2024         Coordination of Care:   1. \"Have you been to the ER, urgent care clinic since your last visit?  Hospitalized since your last visit?\" no    2. \"Have you seen or consulted any other health care providers outside of the [facility] since your last visit?\" no    3. For patients aged 45-75: Has the patient had a colonoscopy / FIT/ Cologuard?NA - based on age or sex  If the patient is female:    4. For patients aged 40-74: Has the patient had a mammogram 
Future    6. Impaired fasting glucose  Continue glp1 to aid in weight loss  - semaglutide, 2 MG/DOSE, (OZEMPIC, 2 MG/DOSE,) 8 MG/3ML SOPN sc injection; Inject 0.75 mLs into the skin every 7 days  Dispense: 3 mL; Refill: 5    7. Body mass index (BMI) 45.0-49.9, adult (HCC)    - semaglutide, 2 MG/DOSE, (OZEMPIC, 2 MG/DOSE,) 8 MG/3ML SOPN sc injection; Inject 0.75 mLs into the skin every 7 days  Dispense: 3 mL; Refill: 5       Lab review: orders written for new lab studies as appropriate; see orders      I have discussed the diagnosis with the patient and the intended plan as seen in the above orders.  The patient has received an after-visit summary and questions were answered concerning future plans.  I have discussed medication side effects and warnings with the patient as well. I have reviewed the plan of care with the patient, accepted their input and they are in agreement with the treatment goals.

## 2024-01-12 LAB
BASOPHILS # BLD AUTO: 0 X10E3/UL (ref 0–0.2)
BASOPHILS NFR BLD AUTO: 0 %
EOSINOPHIL # BLD AUTO: 0 X10E3/UL (ref 0–0.4)
EOSINOPHIL NFR BLD AUTO: 1 %
ERYTHROCYTE [DISTWIDTH] IN BLOOD BY AUTOMATED COUNT: 15.7 % (ref 11.7–15.4)
HCT VFR BLD AUTO: 38 % (ref 34–46.6)
HGB BLD-MCNC: 11.6 G/DL (ref 11.1–15.9)
IMM GRANULOCYTES # BLD AUTO: 0 X10E3/UL (ref 0–0.1)
IMM GRANULOCYTES NFR BLD AUTO: 0 %
IRON SATN MFR SERPL: 24 % (ref 15–55)
IRON SERPL-MCNC: 74 UG/DL (ref 27–159)
LYMPHOCYTES # BLD AUTO: 1.4 X10E3/UL (ref 0.7–3.1)
LYMPHOCYTES NFR BLD AUTO: 23 %
MCH RBC QN AUTO: 21.8 PG (ref 26.6–33)
MCHC RBC AUTO-ENTMCNC: 30.5 G/DL (ref 31.5–35.7)
MCV RBC AUTO: 71 FL (ref 79–97)
MONOCYTES # BLD AUTO: 0.5 X10E3/UL (ref 0.1–0.9)
MONOCYTES NFR BLD AUTO: 7 %
NEUTROPHILS # BLD AUTO: 4.3 X10E3/UL (ref 1.4–7)
NEUTROPHILS NFR BLD AUTO: 69 %
PLATELET # BLD AUTO: 430 X10E3/UL (ref 150–450)
RBC # BLD AUTO: 5.32 X10E6/UL (ref 3.77–5.28)
TIBC SERPL-MCNC: 307 UG/DL (ref 250–450)
TSH SERPL DL<=0.005 MIU/L-ACNC: 2 UIU/ML (ref 0.45–4.5)
UIBC SERPL-MCNC: 233 UG/DL (ref 131–425)
WBC # BLD AUTO: 6.3 X10E3/UL (ref 3.4–10.8)

## 2024-02-06 DIAGNOSIS — E66.01 MORBID (SEVERE) OBESITY DUE TO EXCESS CALORIES (HCC): Primary | ICD-10-CM

## 2024-02-06 RX ORDER — PHENTERMINE HYDROCHLORIDE 37.5 MG/1
37.5 CAPSULE ORAL EVERY MORNING
Qty: 30 CAPSULE | Refills: 2 | Status: SHIPPED | OUTPATIENT
Start: 2024-02-06 | End: 2024-05-06

## 2024-04-15 ENCOUNTER — E-VISIT (OUTPATIENT)
Facility: CLINIC | Age: 39
End: 2024-04-15
Payer: COMMERCIAL

## 2024-04-15 DIAGNOSIS — B37.31 CANDIDIASIS OF VULVA AND VAGINA: Primary | ICD-10-CM

## 2024-04-15 PROCEDURE — 99421 OL DIG E/M SVC 5-10 MIN: CPT | Performed by: FAMILY MEDICINE

## 2024-04-15 RX ORDER — FLUCONAZOLE 150 MG/1
150 TABLET ORAL
Qty: 4 TABLET | Refills: 1 | Status: SHIPPED | OUTPATIENT
Start: 2024-04-15

## 2024-04-15 NOTE — PROGRESS NOTES
Ira Lerma (1985) initiated an asynchronous digital communication through [employer].    HPI: per patient questionnaire     Exam: not applicable    Diagnoses and all orders for this visit:  Diagnoses and all orders for this visit:    Candidiasis of vulva and vagina  -     fluconazole (DIFLUCAN) 150 MG tablet; Take 1 tablet by mouth every 7 days          Time: EV1 - 5-10 minutes were spent on the digital evaluation and management of this patient.    William Warner MD

## 2024-05-22 ENCOUNTER — OFFICE VISIT (OUTPATIENT)
Facility: CLINIC | Age: 39
End: 2024-05-22
Payer: COMMERCIAL

## 2024-05-22 VITALS
TEMPERATURE: 97.8 F | HEIGHT: 68 IN | DIASTOLIC BLOOD PRESSURE: 81 MMHG | HEART RATE: 76 BPM | SYSTOLIC BLOOD PRESSURE: 131 MMHG | OXYGEN SATURATION: 98 % | BODY MASS INDEX: 44.41 KG/M2 | WEIGHT: 293 LBS | RESPIRATION RATE: 20 BRPM

## 2024-05-22 DIAGNOSIS — R73.01 IMPAIRED FASTING GLUCOSE: ICD-10-CM

## 2024-05-22 DIAGNOSIS — J06.9 URI WITH COUGH AND CONGESTION: Primary | ICD-10-CM

## 2024-05-22 PROCEDURE — 3075F SYST BP GE 130 - 139MM HG: CPT | Performed by: FAMILY MEDICINE

## 2024-05-22 PROCEDURE — 3079F DIAST BP 80-89 MM HG: CPT | Performed by: FAMILY MEDICINE

## 2024-05-22 PROCEDURE — 99214 OFFICE O/P EST MOD 30 MIN: CPT | Performed by: FAMILY MEDICINE

## 2024-05-22 RX ORDER — BENZONATATE 200 MG/1
200 CAPSULE ORAL 3 TIMES DAILY PRN
Qty: 30 CAPSULE | Refills: 0 | Status: SHIPPED | OUTPATIENT
Start: 2024-05-22 | End: 2024-06-01

## 2024-05-22 NOTE — PROGRESS NOTES
Ira Lerma is a 38 y.o.  female and presents with    Chief Complaint   Patient presents with    Cough     PRODUCTIVE W/YELLOW PHLEGM    Congestion     Subjective:  Cough  Patient complains of nasal congestion and nonproductive cough. Symptoms began 2 weeks ago. Symptoms have been unchanged since that time.The cough is productive of yellow sputum and is aggravated by reclining position. Associated symptoms include: postnasal drip. Patient does not have new pets. Patient does not have a history of asthma. Patient does not have a history of environmental allergens. Patient has not traveled recently. Patient does not have a history of smoking. Patient has not had a previous chest x-ray. Patient has had a PPD done.    ROS     All other systems reviewed and are negative.      Objective:  Vitals:    05/22/24 1439   BP: 131/81   Pulse: 76   Resp: 20   Temp: 97.8 °F (36.6 °C)   SpO2: 98%         alert, well appearing, and in no distress and oriented to person, place, and time  Mental status - normal mood, behavior, speech, dress, motor activity, and thought processes  Chest - clear to auscultation, no wheezes, rales or rhonchi, symmetric air entry  Heart - normal rate, regular rhythm, normal S1, S2, no murmurs, rubs, clicks or gallops  Neurological - cranial nerves II through XII intact  LABS   Covid negative  TESTS      Assessment/Plan:    1. URI with cough and congestion  Cough suppressant  - benzonatate (TESSALON) 200 MG capsule; Take 1 capsule by mouth 3 times daily as needed for Cough  Dispense: 30 capsule; Refill: 0    2. Impaired fasting glucose  Encourage low carb diet; screen for diabetes  - Hemoglobin A1C; Future       Lab review: orders written for new lab studies as appropriate; see orders      I have discussed the diagnosis with the patient and the intended plan as seen in the above orders.  The patient has received an after-visit summary and questions were answered concerning future

## 2024-05-22 NOTE — PROGRESS NOTES
Ira Lerma is a 38 y.o. presents today for   Chief Complaint   Patient presents with    Cough     PRODUCTIVE W/YELLOW PHLEGM    Congestion       Is someone accompanying this pt? NO    Is the patient using any DME equipment during OV? NO    Depression Screenin/11/2024     1:16 PM 2023     3:08 PM 10/17/2022    11:41 AM 2022    11:22 AM 2022    11:23 AM   PHQ-9 Questionaire   Little interest or pleasure in doing things 0 0 0 0 0   Feeling down, depressed, or hopeless 0 0 0 0 0   PHQ-9 Total Score 0 0 0 0 0       Abuse Screenin/11/2024     1:00 PM   AMB Abuse Screening   Do you ever feel afraid of your partner? N   Are you in a relationship with someone who physically or mentally threatens you? N   Is it safe for you to go home? Y       Learning Assessment:  No question data found.    Fall Risk:      2024     1:16 PM   Fall Risk   2 or more falls in past year? no   Fall with injury in past year? no           Coordination of Care:   1. \"Have you been to the ER, urgent care clinic since your last visit?  Hospitalized since your last visit?\" NO    2. \"Have you seen or consulted any other health care providers outside of the [facility] since your last visit?\" NO    3. For patients aged 45-75: Has the patient had a colonoscopy / FIT/ Cologuard? NO    If the patient is female:    4. For patients aged 40-74: Has the patient had a mammogram within the past 2 years? NO    5. For patients aged 21-65: Has the patient had a pap smear? YES    Health Maintenance: reviewed and discussed and ordered per Provider.    Health Maintenance Due   Topic Date Due    Varicella vaccine (1 of 2 - 2-dose childhood series) Never done    Hepatitis C screen  Never done    Cervical cancer screen  2017    Hepatitis B vaccine (3 of 3 - 19+ 3-dose series) 2021    A1C test (Diabetic or Prediabetic)  2023    COVID-19 Vaccine (3 - -24 season) 2023        Ariane

## 2024-05-23 LAB — HBA1C MFR BLD: 5.2 % (ref 4.8–5.6)

## 2024-05-25 DIAGNOSIS — I10 ESSENTIAL (PRIMARY) HYPERTENSION: ICD-10-CM

## 2024-05-28 PROBLEM — O98.819 GROUP B STREPTOCOCCAL INFECTION DURING PREGNANCY: Status: RESOLVED | Noted: 2020-01-24 | Resolved: 2024-05-28

## 2024-05-28 PROBLEM — Z34.90 PREGNANCY: Status: RESOLVED | Noted: 2020-01-13 | Resolved: 2024-05-28

## 2024-05-28 PROBLEM — B95.1 GROUP B STREPTOCOCCAL INFECTION DURING PREGNANCY: Status: RESOLVED | Noted: 2020-01-24 | Resolved: 2024-05-28

## 2024-05-28 RX ORDER — SPIRONOLACTONE 25 MG/1
25 TABLET ORAL DAILY
Qty: 90 TABLET | Refills: 0 | Status: SHIPPED | OUTPATIENT
Start: 2024-05-28

## 2024-05-28 RX ORDER — VERAPAMIL HYDROCHLORIDE 300 MG/1
300 CAPSULE, EXTENDED RELEASE ORAL DAILY
Qty: 90 CAPSULE | Refills: 0 | Status: SHIPPED | OUTPATIENT
Start: 2024-05-28

## 2024-06-06 ENCOUNTER — OFFICE VISIT (OUTPATIENT)
Facility: CLINIC | Age: 39
End: 2024-06-06
Payer: COMMERCIAL

## 2024-06-06 VITALS
HEIGHT: 68 IN | RESPIRATION RATE: 18 BRPM | WEIGHT: 290.6 LBS | TEMPERATURE: 98.4 F | OXYGEN SATURATION: 98 % | HEART RATE: 81 BPM | SYSTOLIC BLOOD PRESSURE: 124 MMHG | BODY MASS INDEX: 44.04 KG/M2 | DIASTOLIC BLOOD PRESSURE: 78 MMHG

## 2024-06-06 DIAGNOSIS — J40 BRONCHITIS: Primary | ICD-10-CM

## 2024-06-06 PROCEDURE — 3074F SYST BP LT 130 MM HG: CPT | Performed by: FAMILY MEDICINE

## 2024-06-06 PROCEDURE — 3078F DIAST BP <80 MM HG: CPT | Performed by: FAMILY MEDICINE

## 2024-06-06 PROCEDURE — 99214 OFFICE O/P EST MOD 30 MIN: CPT | Performed by: FAMILY MEDICINE

## 2024-06-06 RX ORDER — ALBUTEROL SULFATE 90 UG/1
2 AEROSOL, METERED RESPIRATORY (INHALATION) 4 TIMES DAILY PRN
Qty: 18 G | Refills: 0 | Status: SHIPPED | OUTPATIENT
Start: 2024-06-06

## 2024-06-06 RX ORDER — AZITHROMYCIN 250 MG/1
TABLET, FILM COATED ORAL
Qty: 6 TABLET | Refills: 0 | Status: SHIPPED | OUTPATIENT
Start: 2024-06-06 | End: 2024-06-16

## 2024-06-06 SDOH — ECONOMIC STABILITY: FOOD INSECURITY: WITHIN THE PAST 12 MONTHS, THE FOOD YOU BOUGHT JUST DIDN'T LAST AND YOU DIDN'T HAVE MONEY TO GET MORE.: NEVER TRUE

## 2024-06-06 SDOH — ECONOMIC STABILITY: FOOD INSECURITY: WITHIN THE PAST 12 MONTHS, YOU WORRIED THAT YOUR FOOD WOULD RUN OUT BEFORE YOU GOT MONEY TO BUY MORE.: NEVER TRUE

## 2024-06-06 SDOH — ECONOMIC STABILITY: INCOME INSECURITY: HOW HARD IS IT FOR YOU TO PAY FOR THE VERY BASICS LIKE FOOD, HOUSING, MEDICAL CARE, AND HEATING?: NOT HARD AT ALL

## 2024-06-06 ASSESSMENT — PATIENT HEALTH QUESTIONNAIRE - PHQ9
SUM OF ALL RESPONSES TO PHQ9 QUESTIONS 1 & 2: 0
SUM OF ALL RESPONSES TO PHQ QUESTIONS 1-9: 0
2. FEELING DOWN, DEPRESSED OR HOPELESS: NOT AT ALL
SUM OF ALL RESPONSES TO PHQ QUESTIONS 1-9: 0
1. LITTLE INTEREST OR PLEASURE IN DOING THINGS: NOT AT ALL

## 2024-06-06 NOTE — PROGRESS NOTES
after-visit summary and questions were answered concerning future plans.  I have discussed medication side effects and warnings with the patient as well. I have reviewed the plan of care with the patient, accepted their input and they are in agreement with the treatment goals.

## 2024-08-30 DIAGNOSIS — I10 ESSENTIAL (PRIMARY) HYPERTENSION: ICD-10-CM

## 2024-09-02 RX ORDER — SPIRONOLACTONE 25 MG/1
25 TABLET ORAL DAILY
Qty: 90 TABLET | Refills: 3 | Status: SHIPPED | OUTPATIENT
Start: 2024-09-02

## 2024-09-02 RX ORDER — VERAPAMIL HYDROCHLORIDE 300 MG/1
300 CAPSULE, EXTENDED RELEASE ORAL DAILY
Qty: 90 CAPSULE | Refills: 3 | Status: SHIPPED | OUTPATIENT
Start: 2024-09-02

## 2024-10-24 RX ORDER — TELMISARTAN 20 MG/1
20 TABLET ORAL DAILY
Qty: 90 TABLET | Refills: 3 | Status: SHIPPED | OUTPATIENT
Start: 2024-10-24

## 2024-12-11 ENCOUNTER — HOSPITAL ENCOUNTER (OUTPATIENT)
Facility: HOSPITAL | Age: 39
Setting detail: SPECIMEN
Discharge: HOME | End: 2024-12-14
Payer: COMMERCIAL

## 2024-12-11 ENCOUNTER — OFFICE VISIT (OUTPATIENT)
Facility: CLINIC | Age: 39
End: 2024-12-11
Payer: COMMERCIAL

## 2024-12-11 VITALS
SYSTOLIC BLOOD PRESSURE: 133 MMHG | WEIGHT: 293 LBS | HEART RATE: 89 BPM | RESPIRATION RATE: 18 BRPM | DIASTOLIC BLOOD PRESSURE: 83 MMHG | OXYGEN SATURATION: 97 % | HEIGHT: 68 IN | BODY MASS INDEX: 44.41 KG/M2 | TEMPERATURE: 97.8 F

## 2024-12-11 DIAGNOSIS — F41.1 GENERALIZED ANXIETY DISORDER: ICD-10-CM

## 2024-12-11 DIAGNOSIS — E66.01 MORBID (SEVERE) OBESITY DUE TO EXCESS CALORIES: ICD-10-CM

## 2024-12-11 DIAGNOSIS — Z63.79 STRESS DUE TO ILLNESS OF FAMILY MEMBER: ICD-10-CM

## 2024-12-11 DIAGNOSIS — N30.00 ACUTE CYSTITIS WITHOUT HEMATURIA: Primary | ICD-10-CM

## 2024-12-11 DIAGNOSIS — I10 ESSENTIAL (PRIMARY) HYPERTENSION: ICD-10-CM

## 2024-12-11 LAB
BILIRUBIN, URINE, POC: NEGATIVE
BLOOD URINE, POC: NEGATIVE
GLUCOSE URINE, POC: NEGATIVE
KETONES, URINE, POC: NEGATIVE
LEUKOCYTE ESTERASE, URINE, POC: NORMAL
NITRITE, URINE, POC: NEGATIVE
PH, URINE, POC: 7.5 (ref 4.6–8)
PROTEIN,URINE, POC: NEGATIVE
SPECIFIC GRAVITY, URINE, POC: 1.02 (ref 1–1.03)
TSH SERPL DL<=0.05 MIU/L-ACNC: 1.78 UIU/ML (ref 0.36–3.74)
URINALYSIS CLARITY, POC: CLEAR
URINALYSIS COLOR, POC: YELLOW
UROBILINOGEN, POC: NORMAL MG/DL

## 2024-12-11 PROCEDURE — 99214 OFFICE O/P EST MOD 30 MIN: CPT | Performed by: FAMILY MEDICINE

## 2024-12-11 PROCEDURE — 3075F SYST BP GE 130 - 139MM HG: CPT | Performed by: FAMILY MEDICINE

## 2024-12-11 PROCEDURE — 3079F DIAST BP 80-89 MM HG: CPT | Performed by: FAMILY MEDICINE

## 2024-12-11 PROCEDURE — 36415 COLL VENOUS BLD VENIPUNCTURE: CPT

## 2024-12-11 PROCEDURE — 84443 ASSAY THYROID STIM HORMONE: CPT

## 2024-12-11 PROCEDURE — 81000 URINALYSIS NONAUTO W/SCOPE: CPT | Performed by: FAMILY MEDICINE

## 2024-12-11 RX ORDER — PHENTERMINE HYDROCHLORIDE 37.5 MG/1
37.5 TABLET ORAL
Qty: 30 TABLET | Refills: 2 | Status: SHIPPED | OUTPATIENT
Start: 2024-12-11 | End: 2025-03-11

## 2024-12-11 RX ORDER — CEPHALEXIN 500 MG/1
500 CAPSULE ORAL 2 TIMES DAILY
Qty: 10 CAPSULE | Refills: 0 | Status: SHIPPED | OUTPATIENT
Start: 2024-12-11

## 2024-12-11 RX ORDER — PHENAZOPYRIDINE HYDROCHLORIDE 100 MG/1
100 TABLET, FILM COATED ORAL 3 TIMES DAILY PRN
Qty: 6 TABLET | Refills: 0 | Status: SHIPPED | OUTPATIENT
Start: 2024-12-11 | End: 2024-12-13

## 2024-12-11 RX ORDER — BUPROPION HYDROCHLORIDE 150 MG/1
150 TABLET ORAL EVERY MORNING
Qty: 30 TABLET | Refills: 1 | Status: SHIPPED | OUTPATIENT
Start: 2024-12-11

## 2024-12-11 RX ORDER — TELMISARTAN 20 MG/1
20 TABLET ORAL DAILY
Qty: 90 TABLET | Refills: 3 | Status: SHIPPED | OUTPATIENT
Start: 2024-12-11

## 2024-12-11 SDOH — ECONOMIC STABILITY: FOOD INSECURITY: WITHIN THE PAST 12 MONTHS, THE FOOD YOU BOUGHT JUST DIDN'T LAST AND YOU DIDN'T HAVE MONEY TO GET MORE.: NEVER TRUE

## 2024-12-11 SDOH — ECONOMIC STABILITY: FOOD INSECURITY: WITHIN THE PAST 12 MONTHS, YOU WORRIED THAT YOUR FOOD WOULD RUN OUT BEFORE YOU GOT MONEY TO BUY MORE.: NEVER TRUE

## 2024-12-11 SDOH — ECONOMIC STABILITY: INCOME INSECURITY: HOW HARD IS IT FOR YOU TO PAY FOR THE VERY BASICS LIKE FOOD, HOUSING, MEDICAL CARE, AND HEATING?: NOT HARD AT ALL

## 2024-12-11 ASSESSMENT — PATIENT HEALTH QUESTIONNAIRE - PHQ9
2. FEELING DOWN, DEPRESSED OR HOPELESS: NOT AT ALL
1. LITTLE INTEREST OR PLEASURE IN DOING THINGS: NOT AT ALL
SUM OF ALL RESPONSES TO PHQ QUESTIONS 1-9: 0
SUM OF ALL RESPONSES TO PHQ9 QUESTIONS 1 & 2: 0

## 2024-12-11 NOTE — PROGRESS NOTES
Ira Lerma is a 39 y.o. year old female who presents today for   Chief Complaint   Patient presents with    Back Pain    Medication Refill        \"Have you been to the ER, urgent care clinic since your last visit?  Hospitalized since your last visit?\"   NO     “Have you seen or consulted any other health care providers outside our system since your last visit?”   NO      “Have you had a pap smear?”    Yes , [facility]    Date of last Cervical Cancer screen (HPV or PAP): 4/19/2011             Karina Kan [facility]  [facility]  [address] [place] [address] [id number]  Ph: [phone]  Direct Fax: [phone]  
cephALEXin (KEFLEX) 500 MG capsule; Take 1 capsule by mouth 2 times daily  Dispense: 10 capsule; Refill: 0  - phenazopyridine (PYRIDIUM) 100 MG tablet; Take 1 tablet by mouth 3 times daily as needed for Pain  Dispense: 6 tablet; Refill: 0    2. Essential (primary) hypertension  - telmisartan (MICARDIS) 20 MG tablet; Take 1 tablet by mouth daily  Dispense: 90 tablet; Refill: 3  - Continue verapamil and spironolactone    3. Generalized anxiety disorder  - buPROPion (WELLBUTRIN XL) 150 MG extended release tablet; Take 1 tablet by mouth every morning  Dispense: 30 tablet; Refill: 1  - TSH; Future    4. Morbid (severe) obesity due to excess calories  - phentermine (ADIPEX-P) 37.5 MG tablet; Take 1 tablet by mouth every morning (before breakfast) for 90 days. Max Daily Amount: 37.5 mg  Dispense: 30 tablet; Refill: 2    5. Stress due to illness of family member     Reviewed urinalysis results. Positive for leukocytes. Negative for nitrates.       I have discussed the diagnosis with the patient and the intended plan as seen in the above orders.  The patient has received an after-visit summary and questions were answered concerning future plans.  I have discussed medication side effects and warnings with the patient as well. I have reviewed the plan of care with the patient, accepted their input and they are in agreement with the treatment goals.

## 2025-01-14 ENCOUNTER — TELEPHONE (OUTPATIENT)
Facility: CLINIC | Age: 40
End: 2025-01-14

## 2025-01-14 ENCOUNTER — OFFICE VISIT (OUTPATIENT)
Facility: CLINIC | Age: 40
End: 2025-01-14
Payer: COMMERCIAL

## 2025-01-14 VITALS
WEIGHT: 293 LBS | DIASTOLIC BLOOD PRESSURE: 81 MMHG | TEMPERATURE: 97.9 F | OXYGEN SATURATION: 98 % | RESPIRATION RATE: 20 BRPM | BODY MASS INDEX: 44.41 KG/M2 | HEART RATE: 80 BPM | SYSTOLIC BLOOD PRESSURE: 126 MMHG | HEIGHT: 68 IN

## 2025-01-14 DIAGNOSIS — F41.1 GENERALIZED ANXIETY DISORDER: ICD-10-CM

## 2025-01-14 DIAGNOSIS — I10 ESSENTIAL (PRIMARY) HYPERTENSION: ICD-10-CM

## 2025-01-14 DIAGNOSIS — E66.01 MORBID (SEVERE) OBESITY DUE TO EXCESS CALORIES: Primary | ICD-10-CM

## 2025-01-14 PROCEDURE — 99214 OFFICE O/P EST MOD 30 MIN: CPT | Performed by: FAMILY MEDICINE

## 2025-01-14 PROCEDURE — 3079F DIAST BP 80-89 MM HG: CPT | Performed by: FAMILY MEDICINE

## 2025-01-14 PROCEDURE — 3074F SYST BP LT 130 MM HG: CPT | Performed by: FAMILY MEDICINE

## 2025-01-14 ASSESSMENT — PATIENT HEALTH QUESTIONNAIRE - PHQ9
SUM OF ALL RESPONSES TO PHQ QUESTIONS 1-9: 0
2. FEELING DOWN, DEPRESSED OR HOPELESS: NOT AT ALL
SUM OF ALL RESPONSES TO PHQ QUESTIONS 1-9: 0
SUM OF ALL RESPONSES TO PHQ QUESTIONS 1-9: 0
1. LITTLE INTEREST OR PLEASURE IN DOING THINGS: NOT AT ALL
SUM OF ALL RESPONSES TO PHQ QUESTIONS 1-9: 0
SUM OF ALL RESPONSES TO PHQ9 QUESTIONS 1 & 2: 0

## 2025-01-14 NOTE — PROGRESS NOTES
Ira Lerma is a 39 y.o. year old female who presents today for   Chief Complaint   Patient presents with    Follow-up    Medication Check        \"Have you been to the ER, urgent care clinic since your last visit?  Hospitalized since your last visit?\"   NO     “Have you seen or consulted any other health care providers outside our system since your last visit?”   NO      “Have you had a pap smear?”    NO    Date of last Cervical Cancer screen (HPV or PAP): 4/19/2011             Ariane Herrera [facility]  Phone: [phone]  Fax: [phone]

## 2025-01-14 NOTE — PROGRESS NOTES
Ira Lerma is a 39 y.o.  female and presents with prediabetes, HTN, gestational HTN, and hyperthyroidism.    Chief Complaint   Patient presents with    Follow-up    Medication Check     Subjective:  Ms. Lerma is following up for anxiety and depression.    Wellbutrin  Pt states Wellbutrin is working well and that they have noticed a difference. She states her anxiety and depressive symptoms were typically 9/10 in intensity and feels they are about a 5/10 today. She plans on continuing the medication. She does not report insomnia or increase in anxiety.     Phentemermine  Pt thinks the medication has decreased her appetite however doesn't believe that it is working. She does not weigh herself at home however noticed that she weighs the same as last visit. Willing to keep using it however does not think it is working. She reported cutting back on soda, sweets, and ice cream. Has been using artificial sweeteners. She is not sure why she has been unable to lose weight. Pt denies palpitations however does endorse some feeling of increase body temperature and some \"tossing and turning\" at night. Pt wants to continue to try and lose weight and has a goal to lose about 50 lbs because she want to be healthier.         Patient Active Problem List   Diagnosis    Prediabetes    Hypertension    Gestational hypertension    Chronic hypertension affecting pregnancy    Hyperthyroidism    Obesity, morbid    Hypertension during pregnancy     Allergies   Allergen Reactions    Oxycodone Hives     Family History   Problem Relation Age of Onset    Hypertension Mother     Hypertension Father      Social History     Tobacco Use    Smoking status: Never    Smokeless tobacco: Never   Substance Use Topics    Alcohol use: No       ROS   History obtained from the patient  General ROS: positive for  - hot flashes and sleep disturbance  Psychological ROS: positive for - anxiety and depression  Gastrointestinal ROS:

## 2025-03-20 ENCOUNTER — OFFICE VISIT (OUTPATIENT)
Facility: CLINIC | Age: 40
End: 2025-03-20
Payer: COMMERCIAL

## 2025-03-20 VITALS
WEIGHT: 293 LBS | RESPIRATION RATE: 18 BRPM | SYSTOLIC BLOOD PRESSURE: 136 MMHG | HEIGHT: 68 IN | TEMPERATURE: 97.3 F | HEART RATE: 68 BPM | BODY MASS INDEX: 44.41 KG/M2 | DIASTOLIC BLOOD PRESSURE: 91 MMHG | OXYGEN SATURATION: 99 %

## 2025-03-20 DIAGNOSIS — I10 ESSENTIAL (PRIMARY) HYPERTENSION: ICD-10-CM

## 2025-03-20 DIAGNOSIS — F41.1 GENERALIZED ANXIETY DISORDER: ICD-10-CM

## 2025-03-20 DIAGNOSIS — S16.1XXA STRAIN OF NECK MUSCLE, INITIAL ENCOUNTER: Primary | ICD-10-CM
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
## 2025-03-20 PROCEDURE — 99214 OFFICE O/P EST MOD 30 MIN: CPT | Performed by: FAMILY MEDICINE

## 2025-03-20 PROCEDURE — 3080F DIAST BP >= 90 MM HG: CPT | Performed by: FAMILY MEDICINE

## 2025-03-20 PROCEDURE — 3075F SYST BP GE 130 - 139MM HG: CPT | Performed by: FAMILY MEDICINE

## 2025-03-20 RX ORDER — VERAPAMIL HYDROCHLORIDE 300 MG/1
300 CAPSULE, EXTENDED RELEASE ORAL DAILY
Qty: 90 CAPSULE | Refills: 3 | Status: SHIPPED | OUTPATIENT
Start: 2025-03-20

## 2025-03-20 RX ORDER — BUPROPION HYDROCHLORIDE 150 MG/1
150 TABLET ORAL EVERY MORNING
Qty: 30 TABLET | Refills: 1 | Status: SHIPPED | OUTPATIENT
Start: 2025-03-20

## 2025-03-20 RX ORDER — METHOCARBAMOL 750 MG/1
750 TABLET, FILM COATED ORAL 4 TIMES DAILY
Qty: 40 TABLET | Refills: 0 | Status: SHIPPED | OUTPATIENT
Start: 2025-03-20 | End: 2025-03-30

## 2025-03-20 SDOH — ECONOMIC STABILITY: FOOD INSECURITY: WITHIN THE PAST 12 MONTHS, THE FOOD YOU BOUGHT JUST DIDN'T LAST AND YOU DIDN'T HAVE MONEY TO GET MORE.: NEVER TRUE

## 2025-03-20 SDOH — ECONOMIC STABILITY: FOOD INSECURITY: WITHIN THE PAST 12 MONTHS, YOU WORRIED THAT YOUR FOOD WOULD RUN OUT BEFORE YOU GOT MONEY TO BUY MORE.: NEVER TRUE

## 2025-03-20 ASSESSMENT — PATIENT HEALTH QUESTIONNAIRE - PHQ9
SUM OF ALL RESPONSES TO PHQ QUESTIONS 1-9: 0
1. LITTLE INTEREST OR PLEASURE IN DOING THINGS: NOT AT ALL
SUM OF ALL RESPONSES TO PHQ QUESTIONS 1-9: 0
2. FEELING DOWN, DEPRESSED OR HOPELESS: NOT AT ALL
SUM OF ALL RESPONSES TO PHQ QUESTIONS 1-9: 0
SUM OF ALL RESPONSES TO PHQ QUESTIONS 1-9: 0
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
## 2025-04-10 DIAGNOSIS — I10 ESSENTIAL (PRIMARY) HYPERTENSION: ICD-10-CM

## 2025-04-12 RX ORDER — VERAPAMIL HYDROCHLORIDE 300 MG/1
300 CAPSULE, EXTENDED RELEASE ORAL DAILY
Qty: 90 CAPSULE | Refills: 3 | Status: SHIPPED | OUTPATIENT
Start: 2025-04-12

## 2025-04-22 DIAGNOSIS — I10 ESSENTIAL (PRIMARY) HYPERTENSION: Primary | ICD-10-CM

## 2025-04-22 RX ORDER — VERAPAMIL HYDROCHLORIDE 240 MG/1
240 TABLET, FILM COATED, EXTENDED RELEASE ORAL DAILY
Qty: 30 TABLET | Refills: 5 | Status: SHIPPED | OUTPATIENT
Start: 2025-04-22

## 2025-06-21 DIAGNOSIS — F41.1 GENERALIZED ANXIETY DISORDER: ICD-10-CM

## 2025-06-24 NOTE — TELEPHONE ENCOUNTER
Medication(s) requesting:   Requested Prescriptions     Pending Prescriptions Disp Refills    buPROPion (WELLBUTRIN XL) 150 MG extended release tablet 30 tablet 1     Sig: Take 1 tablet by mouth every morning       Last office visit:  03/20/25  Next office visit DMA: Visit date not found

## 2025-06-25 RX ORDER — BUPROPION HYDROCHLORIDE 150 MG/1
150 TABLET ORAL EVERY MORNING
Qty: 90 TABLET | Refills: 3 | Status: SHIPPED | OUTPATIENT
Start: 2025-06-25

## 2025-06-30 ENCOUNTER — TELEPHONE (OUTPATIENT)
Facility: CLINIC | Age: 40
End: 2025-06-30

## 2025-06-30 NOTE — TELEPHONE ENCOUNTER
Called [facility] @ 10am and s/w Raghu to verify pt's insurance.   Effective date: 10/1/2024 and co-pay: $30.  Information has been updated.  Thank you.

## 2025-07-29 DIAGNOSIS — I10 ESSENTIAL (PRIMARY) HYPERTENSION: ICD-10-CM

## 2025-07-30 RX ORDER — TELMISARTAN 20 MG/1
20 TABLET ORAL DAILY
Qty: 90 TABLET | Refills: 3 | Status: SHIPPED | OUTPATIENT
Start: 2025-07-30